# Patient Record
Sex: FEMALE | Race: WHITE | NOT HISPANIC OR LATINO | Employment: UNEMPLOYED | RURAL
[De-identification: names, ages, dates, MRNs, and addresses within clinical notes are randomized per-mention and may not be internally consistent; named-entity substitution may affect disease eponyms.]

---

## 2021-01-28 ENCOUNTER — HISTORICAL (OUTPATIENT)
Dept: ADMINISTRATIVE | Facility: HOSPITAL | Age: 61
End: 2021-01-28

## 2021-01-28 LAB
ABO: NORMAL
ALBUMIN SERPL BCP-MCNC: 3.2 G/DL (ref 3.5–5)
ALBUMIN/GLOB SERPL: 0.6 {RATIO}
ALP SERPL-CCNC: 96 U/L (ref 50–130)
ALT SERPL W P-5'-P-CCNC: 29 U/L (ref 13–56)
AMYLASE SERPL-CCNC: 64 U/L (ref 25–115)
ANION GAP SERPL CALCULATED.3IONS-SCNC: 13 MMOL/L
ANISOCYTOSIS BLD QL SMEAR: ABNORMAL
ANTIBODY SCREEN: NORMAL
AST SERPL W P-5'-P-CCNC: 40 U/L (ref 15–37)
BASOPHILS # BLD AUTO: 0.02 X10E3/UL (ref 0–0.2)
BASOPHILS NFR BLD AUTO: 0.8 % (ref 0–1)
BILIRUB SERPL-MCNC: 0.7 MG/DL (ref 0–1.2)
BILIRUB UR QL STRIP: NEGATIVE MG/DL
BUN SERPL-MCNC: 12 MG/DL (ref 7–18)
BUN/CREAT SERPL: 14.1
CALCIUM SERPL-MCNC: 8.7 MG/DL (ref 8.5–10.1)
CHLORIDE SERPL-SCNC: 108 MMOL/L (ref 98–107)
CLARITY UR: CLEAR
CO2 SERPL-SCNC: 27 MMOL/L (ref 21–32)
COLOR UR: ABNORMAL
CREAT SERPL-MCNC: 0.85 MG/DL (ref 0.55–1.02)
EOSINOPHIL # BLD AUTO: 0.03 X10E3/UL (ref 0–0.5)
EOSINOPHIL NFR BLD AUTO: 1.3 % (ref 1–4)
ERYTHROCYTE [DISTWIDTH] IN BLOOD BY AUTOMATED COUNT: 15.8 % (ref 11.5–14.5)
GLOBULIN SER-MCNC: 5.3 G/DL (ref 2–4)
GLUCOSE SERPL-MCNC: 119 MG/DL (ref 74–106)
GLUCOSE UR STRIP-MCNC: NEGATIVE MG/DL
HCT VFR BLD AUTO: 27.1 % (ref 38–47)
HGB BLD-MCNC: 7.8 G/DL (ref 12–16)
HYPOCHROMIA BLD QL SMEAR: ABNORMAL
IMM GRANULOCYTES # BLD AUTO: 0.01 X10E3/UL (ref 0–0.04)
IMM GRANULOCYTES NFR BLD: 0.4 % (ref 0–0.4)
KETONES UR STRIP-SCNC: NEGATIVE MG/DL
LEUKOCYTE ESTERASE UR QL STRIP: NEGATIVE LEU/UL
LIPASE SERPL-CCNC: 144 U/L (ref 73–393)
LYMPHOCYTES # BLD AUTO: 0.52 X10E3/UL (ref 1–4.8)
LYMPHOCYTES NFR BLD AUTO: 21.9 % (ref 27–41)
LYMPHOCYTES NFR BLD MANUAL: 20 % (ref 27–41)
MCH RBC QN AUTO: 24.3 PG (ref 27–31)
MCHC RBC AUTO-ENTMCNC: 28.8 G/DL (ref 32–36)
MCV RBC AUTO: 84.4 FL (ref 80–96)
MONOCYTES # BLD AUTO: 0.18 X10E3/UL (ref 0–0.8)
MONOCYTES NFR BLD AUTO: 7.6 % (ref 2–6)
MONOCYTES NFR BLD MANUAL: 8 % (ref 2–6)
MPC BLD CALC-MCNC: 9.8 FL (ref 9.4–12.4)
NEUTROPHILS # BLD AUTO: 1.61 X10E3/UL (ref 1.8–7.7)
NEUTROPHILS NFR BLD AUTO: 68 % (ref 53–65)
NEUTS SEG NFR BLD MANUAL: 72 % (ref 50–62)
NITRITE UR QL STRIP: NEGATIVE
NRBC # BLD AUTO: 0 X10E3/UL (ref 0–0)
NRBC, AUTO (.00): 0 /100 (ref 0–0)
OVALOCYTES BLD QL SMEAR: ABNORMAL
PH UR STRIP: 6.5 PH UNITS (ref 5–8)
PLATELET # BLD AUTO: 99 X10E3/UL (ref 150–400)
PLATELET MORPHOLOGY: ABNORMAL
POLYCHROMASIA BLD QL SMEAR: ABNORMAL
POTASSIUM SERPL-SCNC: 5.1 MMOL/L (ref 3.5–5.1)
PROT SERPL-MCNC: 8.5 G/DL (ref 6.4–8.2)
PROT UR QL STRIP: NEGATIVE MG/DL
RBC # BLD AUTO: 3.21 X10E6/UL (ref 4.2–5.4)
RBC # UR STRIP: ABNORMAL ERY/UL
RH TYPE: NORMAL
SARS-COV-2 RNA AMPLIFICATION, QUAL: NEGATIVE
SODIUM SERPL-SCNC: 143 MMOL/L (ref 136–145)
SP GR UR STRIP: 1.01 (ref 1–1.03)
UNIT NUMBER: NORMAL
UNIT STATUS: NORMAL
UROBILINOGEN UR STRIP-ACNC: 0.2 EU/DL
WBC # BLD AUTO: 2.37 X10E3/UL (ref 4.5–11)

## 2021-01-29 ENCOUNTER — HISTORICAL (OUTPATIENT)
Dept: ADMINISTRATIVE | Facility: HOSPITAL | Age: 61
End: 2021-01-29

## 2021-01-29 LAB
ANION GAP SERPL CALCULATED.3IONS-SCNC: 14 MMOL/L
ANISOCYTOSIS BLD QL SMEAR: ABNORMAL
BASOPHILS # BLD AUTO: 0 X10E3/UL (ref 0–0.2)
BASOPHILS NFR BLD AUTO: 0 % (ref 0–1)
BUN SERPL-MCNC: 14 MG/DL (ref 7–18)
CALCIUM SERPL-MCNC: 8.2 MG/DL (ref 8.5–10.1)
CHLORIDE SERPL-SCNC: 107 MMOL/L (ref 98–107)
CO2 SERPL-SCNC: 24 MMOL/L (ref 21–32)
CREAT SERPL-MCNC: 0.95 MG/DL (ref 0.55–1.02)
EOSINOPHIL # BLD AUTO: 0 X10E3/UL (ref 0–0.5)
EOSINOPHIL NFR BLD AUTO: 0 % (ref 1–4)
ERYTHROCYTE [DISTWIDTH] IN BLOOD BY AUTOMATED COUNT: 15.8 % (ref 11.5–14.5)
GLUCOSE SERPL-MCNC: 150 MG/DL (ref 74–106)
HCT VFR BLD AUTO: 26.6 % (ref 38–47)
HCT VFR BLD AUTO: 26.6 % (ref 38–47)
HGB BLD-MCNC: 7.7 G/DL (ref 12–16)
HGB BLD-MCNC: 8.1 G/DL (ref 12–16)
HYPOCHROMIA BLD QL SMEAR: ABNORMAL
IMM GRANULOCYTES # BLD AUTO: 0.02 X10E3/UL (ref 0–0.04)
IMM GRANULOCYTES NFR BLD: 0.5 % (ref 0–0.4)
LYMPHOCYTES # BLD AUTO: 0.33 X10E3/UL (ref 1–4.8)
LYMPHOCYTES NFR BLD AUTO: 7.6 % (ref 27–41)
LYMPHOCYTES NFR BLD MANUAL: 3 % (ref 27–41)
MCH RBC QN AUTO: 24.4 PG (ref 27–31)
MCHC RBC AUTO-ENTMCNC: 28.9 G/DL (ref 32–36)
MCV RBC AUTO: 84.2 FL (ref 80–96)
MONOCYTES # BLD AUTO: 0.12 X10E3/UL (ref 0–0.8)
MONOCYTES NFR BLD AUTO: 2.8 % (ref 2–6)
MONOCYTES NFR BLD MANUAL: 3 % (ref 2–6)
MPC BLD CALC-MCNC: 9.8 FL (ref 9.4–12.4)
NEUTROPHILS # BLD AUTO: 3.86 X10E3/UL (ref 1.8–7.7)
NEUTROPHILS NFR BLD AUTO: 89.1 % (ref 53–65)
NEUTS SEG NFR BLD MANUAL: 94 % (ref 50–62)
NRBC # BLD AUTO: 0 X10E3/UL (ref 0–0)
NRBC, AUTO (.00): 0 /100 (ref 0–0)
OVALOCYTES BLD QL SMEAR: ABNORMAL
PLATELET # BLD AUTO: 93 X10E3/UL (ref 150–400)
PLATELET # BLD AUTO: 96 X10E3/UL (ref 150–400)
PLATELET MORPHOLOGY: ABNORMAL
POLYCHROMASIA BLD QL SMEAR: ABNORMAL
POTASSIUM SERPL-SCNC: 4.6 MMOL/L (ref 3.5–5.1)
RBC # BLD AUTO: 3.16 X10E6/UL (ref 4.2–5.4)
SODIUM SERPL-SCNC: 140 MMOL/L (ref 136–145)
WBC # BLD AUTO: 4.33 X10E3/UL (ref 4.5–11)

## 2021-01-30 ENCOUNTER — HISTORICAL (OUTPATIENT)
Dept: ADMINISTRATIVE | Facility: HOSPITAL | Age: 61
End: 2021-01-30

## 2021-01-30 LAB
ANION GAP SERPL CALCULATED.3IONS-SCNC: 12 MMOL/L
ANION GAP SERPL CALCULATED.3IONS-SCNC: 13 MMOL/L
ANISOCYTOSIS BLD QL SMEAR: ABNORMAL
BASO STIPL BLD QL SMEAR: ABNORMAL
BASOPHILS # BLD AUTO: 0.02 X10E3/UL (ref 0–0.2)
BASOPHILS # BLD AUTO: 0.02 X10E3/UL (ref 0–0.2)
BASOPHILS NFR BLD AUTO: 0.4 % (ref 0–1)
BASOPHILS NFR BLD AUTO: 0.4 % (ref 0–1)
BUN SERPL-MCNC: 12 MG/DL (ref 7–18)
BUN SERPL-MCNC: 13 MG/DL (ref 7–18)
CALCIUM SERPL-MCNC: 7.8 MG/DL (ref 8.5–10.1)
CALCIUM SERPL-MCNC: 7.9 MG/DL (ref 8.5–10.1)
CHLORIDE SERPL-SCNC: 105 MMOL/L (ref 98–107)
CHLORIDE SERPL-SCNC: 107 MMOL/L (ref 98–107)
CO2 SERPL-SCNC: 25 MMOL/L (ref 21–32)
CO2 SERPL-SCNC: 26 MMOL/L (ref 21–32)
CREAT SERPL-MCNC: 0.93 MG/DL (ref 0.55–1.02)
CREAT SERPL-MCNC: 0.99 MG/DL (ref 0.55–1.02)
EOSINOPHIL # BLD AUTO: 0 X10E3/UL (ref 0–0.5)
EOSINOPHIL # BLD AUTO: 0.02 X10E3/UL (ref 0–0.5)
EOSINOPHIL NFR BLD AUTO: 0 % (ref 1–4)
EOSINOPHIL NFR BLD AUTO: 0.4 % (ref 1–4)
ERYTHROCYTE [DISTWIDTH] IN BLOOD BY AUTOMATED COUNT: 16.1 % (ref 11.5–14.5)
ERYTHROCYTE [DISTWIDTH] IN BLOOD BY AUTOMATED COUNT: 16.2 % (ref 11.5–14.5)
GLUCOSE SERPL-MCNC: 108 MG/DL (ref 74–106)
GLUCOSE SERPL-MCNC: 109 MG/DL (ref 74–106)
HCT VFR BLD AUTO: 25 % (ref 38–47)
HCT VFR BLD AUTO: 26.9 % (ref 38–47)
HGB BLD-MCNC: 7.5 G/DL (ref 12–16)
HGB BLD-MCNC: 7.9 G/DL (ref 12–16)
HYPOCHROMIA BLD QL SMEAR: ABNORMAL
IMM GRANULOCYTES # BLD AUTO: 0.01 X10E3/UL (ref 0–0.04)
IMM GRANULOCYTES # BLD AUTO: 0.02 X10E3/UL (ref 0–0.04)
IMM GRANULOCYTES NFR BLD: 0.2 % (ref 0–0.4)
IMM GRANULOCYTES NFR BLD: 0.4 % (ref 0–0.4)
LYMPHOCYTES # BLD AUTO: 1.05 X10E3/UL (ref 1–4.8)
LYMPHOCYTES # BLD AUTO: 1.15 X10E3/UL (ref 1–4.8)
LYMPHOCYTES NFR BLD AUTO: 22.1 % (ref 27–41)
LYMPHOCYTES NFR BLD AUTO: 23 % (ref 27–41)
MCH RBC QN AUTO: 24.6 PG (ref 27–31)
MCH RBC QN AUTO: 25.2 PG (ref 27–31)
MCHC RBC AUTO-ENTMCNC: 29.4 G/DL (ref 32–36)
MCHC RBC AUTO-ENTMCNC: 30 G/DL (ref 32–36)
MCV RBC AUTO: 83.8 FL (ref 80–96)
MCV RBC AUTO: 83.9 FL (ref 80–96)
MONOCYTES # BLD AUTO: 0.52 X10E3/UL (ref 0–0.8)
MONOCYTES # BLD AUTO: 0.53 X10E3/UL (ref 0–0.8)
MONOCYTES NFR BLD AUTO: 10.6 % (ref 2–6)
MONOCYTES NFR BLD AUTO: 10.9 % (ref 2–6)
MPC BLD CALC-MCNC: 10.1 FL (ref 9.4–12.4)
MPC BLD CALC-MCNC: 9.4 FL (ref 9.4–12.4)
NEUTROPHILS # BLD AUTO: 3.13 X10E3/UL (ref 1.8–7.7)
NEUTROPHILS # BLD AUTO: 3.28 X10E3/UL (ref 1.8–7.7)
NEUTROPHILS NFR BLD AUTO: 65.6 % (ref 53–65)
NEUTROPHILS NFR BLD AUTO: 66 % (ref 53–65)
NRBC # BLD AUTO: 0 X10E3/UL (ref 0–0)
NRBC # BLD AUTO: 0 X10E3/UL (ref 0–0)
NRBC, AUTO (.00): 0 /100 (ref 0–0)
NRBC, AUTO (.00): 0 /100 (ref 0–0)
OVALOCYTES BLD QL SMEAR: ABNORMAL
PLATELET # BLD AUTO: 88 X10E3/UL (ref 150–400)
PLATELET # BLD AUTO: 89 X10E3/UL (ref 150–400)
PLATELET MORPHOLOGY: ABNORMAL
POLYCHROMASIA BLD QL SMEAR: ABNORMAL
POTASSIUM SERPL-SCNC: 3.7 MMOL/L (ref 3.5–5.1)
POTASSIUM SERPL-SCNC: 4.1 MMOL/L (ref 3.5–5.1)
RBC # BLD AUTO: 2.98 X10E6/UL (ref 4.2–5.4)
RBC # BLD AUTO: 3.21 X10E6/UL (ref 4.2–5.4)
SODIUM SERPL-SCNC: 140 MMOL/L (ref 136–145)
SODIUM SERPL-SCNC: 140 MMOL/L (ref 136–145)
STOMATOCYTES BLD QL SMEAR: ABNORMAL
WBC # BLD AUTO: 4.75 X10E3/UL (ref 4.5–11)
WBC # BLD AUTO: 5 X10E3/UL (ref 4.5–11)

## 2021-01-31 ENCOUNTER — HISTORICAL (OUTPATIENT)
Dept: ADMINISTRATIVE | Facility: HOSPITAL | Age: 61
End: 2021-01-31

## 2021-01-31 LAB
A1AT SERPL NEPH-MCNC: 157 MG/DL (ref 90–200)
ALBUMIN SERPL BCP-MCNC: 2.7 G/DL (ref 3.5–5)
ALP SERPL-CCNC: 77 U/L (ref 50–130)
ALT SERPL W P-5'-P-CCNC: 26 U/L (ref 13–56)
ANION GAP SERPL CALCULATED.3IONS-SCNC: 12 MMOL/L
AST SERPL W P-5'-P-CCNC: 44 U/L (ref 15–37)
BASOPHILS # BLD AUTO: 0.02 X10E3/UL (ref 0–0.2)
BASOPHILS NFR BLD AUTO: 0.6 % (ref 0–1)
BILIRUB DIRECT SERPL-MCNC: 0.4 MG/DL (ref 0–0.2)
BILIRUB SERPL-MCNC: 1 MG/DL (ref 0–1.2)
BUN SERPL-MCNC: 10 MG/DL (ref 7–18)
CALCIUM SERPL-MCNC: 7.6 MG/DL (ref 8.5–10.1)
CERULOPLASMIN SERPL-MCNC: 23.3 MG/DL (ref 20–60)
CHLORIDE SERPL-SCNC: 106 MMOL/L (ref 98–107)
CO2 SERPL-SCNC: 26 MMOL/L (ref 21–32)
CREAT SERPL-MCNC: 0.87 MG/DL (ref 0.55–1.02)
EOSINOPHIL # BLD AUTO: 0.03 X10E3/UL (ref 0–0.5)
EOSINOPHIL NFR BLD AUTO: 1 % (ref 1–4)
ERYTHROCYTE [DISTWIDTH] IN BLOOD BY AUTOMATED COUNT: 16.4 % (ref 11.5–14.5)
FERRITIN SERPL-MCNC: 14 NG/ML (ref 8–252)
GLUCOSE SERPL-MCNC: 100 MG/DL (ref 74–106)
HAV IGM SER QL: ABNORMAL
HBV CORE IGM SER QL: ABNORMAL
HBV SURFACE AG SERPL QL IA: ABNORMAL
HCT VFR BLD AUTO: 25.3 % (ref 38–47)
HCV AB SER QL: ABNORMAL
HCV AB SER QL: ABNORMAL
HGB BLD-MCNC: 7.4 G/DL (ref 12–16)
IGA SERPL-MCNC: 167 MG/DL (ref 61–356)
IGG SERPL-MCNC: 2250 MG/DL (ref 767–1590)
IGM SERPL-MCNC: 98 MG/DL (ref 37–286)
IMM GRANULOCYTES # BLD AUTO: 0.01 X10E3/UL (ref 0–0.04)
IMM GRANULOCYTES NFR BLD: 0.3 % (ref 0–0.4)
INR BLD: 1.43 (ref 0–3.3)
IRON SATN MFR SERPL: 14 % (ref 14–50)
IRON SERPL-MCNC: 41 UG/DL (ref 50–170)
LYMPHOCYTES # BLD AUTO: 0.85 X10E3/UL (ref 1–4.8)
LYMPHOCYTES NFR BLD AUTO: 27.2 % (ref 27–41)
MCH RBC QN AUTO: 24.9 PG (ref 27–31)
MCHC RBC AUTO-ENTMCNC: 29.2 G/DL (ref 32–36)
MCV RBC AUTO: 85.2 FL (ref 80–96)
MONOCYTES # BLD AUTO: 0.36 X10E3/UL (ref 0–0.8)
MONOCYTES NFR BLD AUTO: 11.5 % (ref 2–6)
MPC BLD CALC-MCNC: 10.6 FL (ref 9.4–12.4)
NEUTROPHILS # BLD AUTO: 1.86 X10E3/UL (ref 1.8–7.7)
NEUTROPHILS NFR BLD AUTO: 59.4 % (ref 53–65)
NRBC # BLD AUTO: 0 X10E3/UL (ref 0–0)
NRBC, AUTO (.00): 0 /100 (ref 0–0)
PLATELET # BLD AUTO: 84 X10E3/UL (ref 150–400)
POTASSIUM SERPL-SCNC: 3.7 MMOL/L (ref 3.5–5.1)
PROT SERPL-MCNC: 7 G/DL (ref 6.4–8.2)
PROTHROMBIN TIME: 16.7 SECONDS (ref 11.7–14.7)
RBC # BLD AUTO: 2.97 X10E6/UL (ref 4.2–5.4)
SODIUM SERPL-SCNC: 140 MMOL/L (ref 136–145)
TIBC SERPL-MCNC: 287 UG/DL (ref 250–450)
WBC # BLD AUTO: 3.13 X10E3/UL (ref 4.5–11)

## 2021-02-01 ENCOUNTER — HISTORICAL (OUTPATIENT)
Dept: ADMINISTRATIVE | Facility: HOSPITAL | Age: 61
End: 2021-02-01

## 2021-02-01 LAB
INSULIN SERPL-ACNC: NORMAL U[IU]/ML

## 2021-02-02 ENCOUNTER — HISTORICAL (OUTPATIENT)
Dept: ADMINISTRATIVE | Facility: HOSPITAL | Age: 61
End: 2021-02-02

## 2021-02-02 LAB
ANA SER QL: NEGATIVE
BILIRUB UR QL STRIP: ABNORMAL MG/DL
CLARITY UR: CLEAR
COLOR UR: YELLOW
GLUCOSE UR STRIP-MCNC: NEGATIVE MG/DL
KETONES UR STRIP-SCNC: >=80 MG/DL
LEUKOCYTE ESTERASE UR QL STRIP: NEGATIVE LEU/UL
NITRITE UR QL STRIP: NEGATIVE
PH UR STRIP: 5.5 PH UNITS (ref 5–8)
PROT UR QL STRIP: NEGATIVE MG/DL
RBC # UR STRIP: ABNORMAL ERY/UL
SMOOTH MUSCLE ANTIBODY: NEGATIVE
SP GR UR STRIP: >=1.03 (ref 1–1.03)
UROBILINOGEN UR STRIP-ACNC: 1 EU/DL

## 2021-02-03 LAB
HCV RNA SERPL NAA+PROBE-ACNC: ABNORMAL IU/ML
HCV RNA SERPL NAA+PROBE-ACNC: ABNORMAL IU/ML
HIV 1+O+2 AB SERPL QL: NORMAL

## 2021-02-05 LAB
ANTI-MITOCHONDRIAL (M2) AB INDEX: 0.05
HGB FRACT BLD ELPH-IMP: NEGATIVE

## 2021-03-21 DIAGNOSIS — N93.9 ABNORMAL UTERINE BLEEDING (AUB): Primary | ICD-10-CM

## 2021-04-12 ENCOUNTER — HOSPITAL ENCOUNTER (EMERGENCY)
Facility: HOSPITAL | Age: 61
Discharge: HOME | End: 2021-04-12
Payer: COMMERCIAL

## 2021-04-12 VITALS
DIASTOLIC BLOOD PRESSURE: 81 MMHG | TEMPERATURE: 98 F | OXYGEN SATURATION: 100 % | HEIGHT: 64 IN | WEIGHT: 229.5 LBS | RESPIRATION RATE: 18 BRPM | HEART RATE: 92 BPM | SYSTOLIC BLOOD PRESSURE: 174 MMHG | BODY MASS INDEX: 39.18 KG/M2

## 2021-04-12 DIAGNOSIS — R10.9 ABDOMINAL PAIN: ICD-10-CM

## 2021-04-12 DIAGNOSIS — N93.8 DYSFUNCTIONAL UTERINE BLEEDING: Primary | ICD-10-CM

## 2021-04-12 DIAGNOSIS — D64.9 ANEMIA, UNSPECIFIED TYPE: ICD-10-CM

## 2021-04-12 LAB
ALBUMIN SERPL BCP-MCNC: 3.4 G/DL (ref 3.5–5)
ALBUMIN/GLOB SERPL: 0.8 {RATIO}
ALP SERPL-CCNC: 91 U/L (ref 50–130)
ALT SERPL W P-5'-P-CCNC: 30 U/L (ref 13–56)
ANION GAP SERPL CALCULATED.3IONS-SCNC: 13 MMOL/L
AST SERPL W P-5'-P-CCNC: 49 U/L (ref 15–37)
BASOPHILS # BLD AUTO: 0.03 X10E3/UL (ref 0–0.2)
BASOPHILS NFR BLD AUTO: 0.7 % (ref 0–1)
BILIRUB SERPL-MCNC: 1.3 MG/DL (ref 0–1.2)
BILIRUB UR QL STRIP: NEGATIVE MG/DL
BUN SERPL-MCNC: 7 MG/DL (ref 7–18)
BUN/CREAT SERPL: 6.4
CALCIUM SERPL-MCNC: 8.4 MG/DL (ref 8.5–10.1)
CHLORIDE SERPL-SCNC: 106 MMOL/L (ref 98–107)
CLARITY UR: ABNORMAL
CO2 SERPL-SCNC: 26 MMOL/L (ref 21–32)
COLOR UR: ABNORMAL
CREAT SERPL-MCNC: 1.1 MG/DL (ref 0.55–1.02)
EOSINOPHIL # BLD AUTO: 0.12 X10E3/UL (ref 0–0.5)
EOSINOPHIL NFR BLD AUTO: 2.9 % (ref 1–4)
ERYTHROCYTE [DISTWIDTH] IN BLOOD BY AUTOMATED COUNT: 18.9 % (ref 11.5–14.5)
GLOBULIN SER-MCNC: 4.4 G/DL (ref 2–4)
GLUCOSE SERPL-MCNC: 112 MG/DL (ref 74–106)
GLUCOSE UR STRIP-MCNC: NEGATIVE MG/DL
HCT VFR BLD AUTO: 25.1 % (ref 38–47)
HGB BLD-MCNC: 7.6 G/DL (ref 12–16)
IMM GRANULOCYTES # BLD AUTO: 0.01 X10E3/UL (ref 0–0.04)
IMM GRANULOCYTES NFR BLD: 0.2 % (ref 0–0.4)
KETONES UR STRIP-SCNC: NEGATIVE MG/DL
LEUKOCYTE ESTERASE UR QL STRIP: NEGATIVE LEU/UL
LYMPHOCYTES # BLD AUTO: 0.94 X10E3/UL (ref 1–4.8)
LYMPHOCYTES NFR BLD AUTO: 22.4 % (ref 27–41)
MCH RBC QN AUTO: 24.7 PG (ref 27–31)
MCHC RBC AUTO-ENTMCNC: 30.3 G/DL (ref 32–36)
MCV RBC AUTO: 81.5 FL (ref 80–96)
MONOCYTES # BLD AUTO: 0.38 X10E3/UL (ref 0–0.8)
MONOCYTES NFR BLD AUTO: 9 % (ref 2–6)
MPC BLD CALC-MCNC: 9.7 FL (ref 9.4–12.4)
NEUTROPHILS # BLD AUTO: 2.72 X10E3/UL (ref 1.8–7.7)
NEUTROPHILS NFR BLD AUTO: 64.8 % (ref 53–65)
NITRITE UR QL STRIP: NEGATIVE
NRBC # BLD AUTO: 0 X10E3/UL (ref 0–0)
NRBC, AUTO (.00): 0 /100 (ref 0–0)
PH UR STRIP: 5.5 PH UNITS (ref 5–8)
PLATELET # BLD AUTO: 116 X10E3/UL (ref 150–400)
POTASSIUM SERPL-SCNC: 4 MMOL/L (ref 3.5–5.1)
PROT SERPL-MCNC: 7.8 G/DL (ref 6.4–8.2)
PROT UR QL STRIP: ABNORMAL MG/DL
RBC # BLD AUTO: 3.08 X10E6/UL (ref 4.2–5.4)
RBC # UR STRIP: ABNORMAL ERY/UL
SODIUM SERPL-SCNC: 141 MMOL/L (ref 136–145)
SP GR UR STRIP: 1.01 (ref 1–1.03)
UROBILINOGEN UR STRIP-ACNC: 0.2 EU/DL
WBC # BLD AUTO: 4.2 X10E3/UL (ref 4.5–11)

## 2021-04-12 PROCEDURE — 80053 COMPREHEN METABOLIC PANEL: CPT

## 2021-04-12 PROCEDURE — 25000003 PHARM REV CODE 250: Performed by: NURSE PRACTITIONER

## 2021-04-12 PROCEDURE — 99285 PR EMERGENCY DEPT VISIT,LEVEL V: ICD-10-PCS | Mod: ,,, | Performed by: NURSE PRACTITIONER

## 2021-04-12 PROCEDURE — 99285 EMERGENCY DEPT VISIT HI MDM: CPT | Mod: ,,, | Performed by: NURSE PRACTITIONER

## 2021-04-12 PROCEDURE — 85025 COMPLETE CBC W/AUTO DIFF WBC: CPT

## 2021-04-12 PROCEDURE — 63600175 PHARM REV CODE 636 W HCPCS: Performed by: NURSE PRACTITIONER

## 2021-04-12 PROCEDURE — 96372 THER/PROPH/DIAG INJ SC/IM: CPT

## 2021-04-12 PROCEDURE — 36415 COLL VENOUS BLD VENIPUNCTURE: CPT

## 2021-04-12 PROCEDURE — 99284 EMERGENCY DEPT VISIT MOD MDM: CPT | Mod: 25

## 2021-04-12 RX ORDER — KETOROLAC TROMETHAMINE 30 MG/ML
60 INJECTION, SOLUTION INTRAMUSCULAR; INTRAVENOUS
Status: COMPLETED | OUTPATIENT
Start: 2021-04-12 | End: 2021-04-12

## 2021-04-12 RX ORDER — MORPHINE SULFATE 4 MG/ML
4 INJECTION, SOLUTION INTRAMUSCULAR; INTRAVENOUS
Status: COMPLETED | OUTPATIENT
Start: 2021-04-12 | End: 2021-04-12

## 2021-04-12 RX ORDER — MEDROXYPROGESTERONE ACETATE 10 MG/1
20 TABLET ORAL ONCE
Status: COMPLETED | OUTPATIENT
Start: 2021-04-12 | End: 2021-04-12

## 2021-04-12 RX ORDER — MEDROXYPROGESTERONE ACETATE 150 MG/ML
150 INJECTION, SUSPENSION INTRAMUSCULAR ONCE
Status: COMPLETED | OUTPATIENT
Start: 2021-04-12 | End: 2021-04-12

## 2021-04-12 RX ORDER — ONDANSETRON 2 MG/ML
4 INJECTION INTRAMUSCULAR; INTRAVENOUS
Status: COMPLETED | OUTPATIENT
Start: 2021-04-12 | End: 2021-04-12

## 2021-04-12 RX ADMIN — MEDROXYPROGESTERONE ACETATE 150 MG: 150 INJECTION, SUSPENSION INTRAMUSCULAR at 09:04

## 2021-04-12 RX ADMIN — KETOROLAC TROMETHAMINE 60 MG: 30 INJECTION, SOLUTION INTRAMUSCULAR at 09:04

## 2021-04-12 RX ADMIN — MORPHINE SULFATE 4 MG: 4 INJECTION INTRAVENOUS at 10:04

## 2021-04-12 RX ADMIN — ONDANSETRON 4 MG: 2 INJECTION INTRAMUSCULAR; INTRAVENOUS at 10:04

## 2021-04-12 RX ADMIN — MEDROXYPROGESTERONE ACETATE 20 MG: 10 TABLET ORAL at 09:04

## 2021-04-19 ENCOUNTER — OFFICE VISIT (OUTPATIENT)
Dept: GASTROENTEROLOGY | Facility: CLINIC | Age: 61
End: 2021-04-19
Payer: COMMERCIAL

## 2021-04-19 VITALS
OXYGEN SATURATION: 100 % | WEIGHT: 216 LBS | SYSTOLIC BLOOD PRESSURE: 124 MMHG | DIASTOLIC BLOOD PRESSURE: 86 MMHG | BODY MASS INDEX: 36.88 KG/M2 | HEIGHT: 64 IN | RESPIRATION RATE: 16 BRPM | HEART RATE: 66 BPM

## 2021-04-19 DIAGNOSIS — R14.0 ABDOMINAL BLOATING: ICD-10-CM

## 2021-04-19 DIAGNOSIS — R10.84 GENERALIZED ABDOMINAL PAIN: ICD-10-CM

## 2021-04-19 DIAGNOSIS — R14.0 ABDOMINAL DISTENSION (GASEOUS): ICD-10-CM

## 2021-04-19 DIAGNOSIS — B18.2 CHRONIC HEPATITIS C WITHOUT HEPATIC COMA: Primary | ICD-10-CM

## 2021-04-19 DIAGNOSIS — Z12.11 ENCOUNTER FOR SCREENING FOR MALIGNANT NEOPLASM OF COLON: ICD-10-CM

## 2021-04-19 DIAGNOSIS — K21.9 GASTROESOPHAGEAL REFLUX DISEASE, UNSPECIFIED WHETHER ESOPHAGITIS PRESENT: ICD-10-CM

## 2021-04-19 PROCEDURE — 99214 PR OFFICE/OUTPT VISIT, EST, LEVL IV, 30-39 MIN: ICD-10-PCS | Mod: ,,, | Performed by: NURSE PRACTITIONER

## 2021-04-19 PROCEDURE — 99214 OFFICE O/P EST MOD 30 MIN: CPT | Mod: ,,, | Performed by: NURSE PRACTITIONER

## 2021-04-19 RX ORDER — MEDROXYPROGESTERONE ACETATE 10 MG/1
TABLET ORAL
Status: ON HOLD | COMMUNITY
Start: 2021-04-13 | End: 2021-05-03 | Stop reason: HOSPADM

## 2021-04-19 RX ORDER — VELPATASVIR AND SOFOSBUVIR 100; 400 MG/1; MG/1
TABLET, FILM COATED ORAL
COMMUNITY
Start: 2021-04-15 | End: 2021-07-27

## 2021-04-22 DIAGNOSIS — N93.9 ABNORMAL UTERINE BLEEDING (AUB): Primary | ICD-10-CM

## 2021-04-28 ENCOUNTER — HOSPITAL ENCOUNTER (INPATIENT)
Facility: HOSPITAL | Age: 61
LOS: 5 days | Discharge: HOME | DRG: 742 | End: 2021-05-03
Attending: OBSTETRICS & GYNECOLOGY | Admitting: OBSTETRICS & GYNECOLOGY
Payer: COMMERCIAL

## 2021-04-28 DIAGNOSIS — Z90.710 S/P HYSTERECTOMY: ICD-10-CM

## 2021-04-28 DIAGNOSIS — D50.0 BLOOD LOSS ANEMIA: Primary | ICD-10-CM

## 2021-04-28 DIAGNOSIS — Z01.818 PREOP TESTING: ICD-10-CM

## 2021-04-28 DIAGNOSIS — N93.9 ABNORMAL UTERINE BLEEDING (AUB): Primary | ICD-10-CM

## 2021-04-28 LAB
ALBUMIN SERPL BCP-MCNC: 3.1 G/DL (ref 3.5–5)
ALBUMIN/GLOB SERPL: 0.7 {RATIO}
ALP SERPL-CCNC: 63 U/L (ref 50–130)
ALT SERPL W P-5'-P-CCNC: 14 U/L (ref 13–56)
ANION GAP SERPL CALCULATED.3IONS-SCNC: 14 MMOL/L (ref 7–16)
ANISOCYTOSIS BLD QL SMEAR: ABNORMAL
APTT PPP: 29.5 SECONDS (ref 25.2–37.3)
AST SERPL W P-5'-P-CCNC: 21 U/L (ref 15–37)
BASOPHILS NFR BLD MANUAL: 1 % (ref 0–1)
BILIRUB SERPL-MCNC: 1.2 MG/DL (ref 0–1.2)
BILIRUB UR QL STRIP: NEGATIVE
BUN SERPL-MCNC: 7 MG/DL (ref 7–18)
BUN/CREAT SERPL: 7 (ref 6–20)
CALCIUM SERPL-MCNC: 8.2 MG/DL (ref 8.5–10.1)
CHLORIDE SERPL-SCNC: 110 MMOL/L (ref 98–107)
CLARITY UR: CLEAR
CO2 SERPL-SCNC: 22 MMOL/L (ref 21–32)
COLOR UR: NORMAL
CREAT SERPL-MCNC: 1.02 MG/DL (ref 0.55–1.02)
DIFFERENTIAL METHOD BLD: ABNORMAL
EOSINOPHIL NFR BLD MANUAL: 1 % (ref 1–4)
ERYTHROCYTE [DISTWIDTH] IN BLOOD BY AUTOMATED COUNT: 17.3 % (ref 11.5–14.5)
GLOBULIN SER-MCNC: 4.4 G/DL (ref 2–4)
GLUCOSE SERPL-MCNC: 96 MG/DL (ref 74–106)
GLUCOSE UR STRIP-MCNC: NEGATIVE MG/DL
HCT VFR BLD AUTO: 22.3 % (ref 38–47)
HCT VFR BLD AUTO: 23.2 % (ref 38–47)
HGB BLD-MCNC: 6.7 G/DL (ref 12–16)
HYPOCHROMIA BLD QL SMEAR: ABNORMAL
INDIRECT COOMBS: NORMAL
INR BLD: 1.7 (ref 0.9–1.1)
KETONES UR STRIP-SCNC: NORMAL MG/DL
LEUKOCYTE ESTERASE UR QL STRIP: NEGATIVE
LYMPHOCYTES NFR BLD MANUAL: 26 % (ref 27–41)
MCH RBC QN AUTO: 23.8 PG (ref 27–31)
MCHC RBC AUTO-ENTMCNC: 30 G/DL (ref 32–36)
MCV RBC AUTO: 79.1 FL (ref 80–96)
MONOCYTES NFR BLD MANUAL: 9 % (ref 2–6)
MPC BLD CALC-MCNC: 9.8 FL (ref 9.4–12.4)
NEUTS BAND NFR BLD MANUAL: 1 % (ref 1–5)
NEUTS SEG NFR BLD MANUAL: 62 % (ref 50–62)
NITRITE UR QL STRIP: NEGATIVE
NRBC # BLD AUTO: 0 X10E3/UL
NRBC BLD MANUAL-RTO: 1 /100 WBC
NRBC, AUTO (.00): 0 %
OVALOCYTES BLD QL SMEAR: ABNORMAL
PH UR STRIP: 6 PH UNITS
PLATELET # BLD AUTO: 113 K/UL (ref 150–400)
PLATELET MORPHOLOGY: ABNORMAL
POLYCHROMASIA BLD QL SMEAR: ABNORMAL
POTASSIUM SERPL-SCNC: 3.7 MMOL/L (ref 3.5–5.1)
PROT SERPL-MCNC: 7.5 G/DL (ref 6.4–8.2)
PROT UR QL STRIP: NEGATIVE
PROTHROMBIN TIME: 19 SECONDS (ref 11.7–14.7)
RBC # BLD AUTO: 2.82 M/UL (ref 4.2–5.4)
RBC # UR STRIP: NEGATIVE /UL
RH BLD: NORMAL
SODIUM SERPL-SCNC: 142 MMOL/L (ref 136–145)
SP GR UR STRIP: <=1.005
UROBILINOGEN UR STRIP-ACNC: 1 MG/DL
WBC # BLD AUTO: 2.02 K/UL (ref 4.5–11)

## 2021-04-28 PROCEDURE — 99900035 HC TECH TIME PER 15 MIN (STAT)

## 2021-04-28 PROCEDURE — 85610 PROTHROMBIN TIME: CPT | Performed by: NURSE PRACTITIONER

## 2021-04-28 PROCEDURE — 85025 COMPLETE CBC W/AUTO DIFF WBC: CPT | Performed by: NURSE PRACTITIONER

## 2021-04-28 PROCEDURE — 80053 COMPREHEN METABOLIC PANEL: CPT | Performed by: NURSE PRACTITIONER

## 2021-04-28 PROCEDURE — 36415 COLL VENOUS BLD VENIPUNCTURE: CPT | Performed by: OBSTETRICS & GYNECOLOGY

## 2021-04-28 PROCEDURE — 36592 COLLECT BLOOD FROM PICC: CPT

## 2021-04-28 PROCEDURE — P9016 RBC LEUKOCYTES REDUCED: HCPCS | Performed by: NURSE PRACTITIONER

## 2021-04-28 PROCEDURE — 86923 COMPATIBILITY TEST ELECTRIC: CPT | Mod: 91 | Performed by: OBSTETRICS & GYNECOLOGY

## 2021-04-28 PROCEDURE — P9016 RBC LEUKOCYTES REDUCED: HCPCS | Performed by: OBSTETRICS & GYNECOLOGY

## 2021-04-28 PROCEDURE — 86923 COMPATIBILITY TEST ELECTRIC: CPT | Mod: 91 | Performed by: NURSE PRACTITIONER

## 2021-04-28 PROCEDURE — 25000003 PHARM REV CODE 250: Performed by: OBSTETRICS & GYNECOLOGY

## 2021-04-28 PROCEDURE — 25000003 PHARM REV CODE 250: Performed by: NURSE PRACTITIONER

## 2021-04-28 PROCEDURE — 81003 URINALYSIS AUTO W/O SCOPE: CPT | Performed by: NURSE PRACTITIONER

## 2021-04-28 PROCEDURE — 85730 THROMBOPLASTIN TIME PARTIAL: CPT | Performed by: NURSE PRACTITIONER

## 2021-04-28 PROCEDURE — 11000001 HC ACUTE MED/SURG PRIVATE ROOM

## 2021-04-28 PROCEDURE — 36430 TRANSFUSION BLD/BLD COMPNT: CPT

## 2021-04-28 PROCEDURE — 86900 BLOOD TYPING SEROLOGIC ABO: CPT | Performed by: NURSE PRACTITIONER

## 2021-04-28 PROCEDURE — 36415 COLL VENOUS BLD VENIPUNCTURE: CPT | Performed by: NURSE PRACTITIONER

## 2021-04-28 PROCEDURE — 85014 HEMATOCRIT: CPT | Performed by: OBSTETRICS & GYNECOLOGY

## 2021-04-28 RX ORDER — SODIUM CHLORIDE 9 MG/ML
INJECTION, SOLUTION INTRAVENOUS CONTINUOUS
Status: DISCONTINUED | OUTPATIENT
Start: 2021-04-28 | End: 2021-05-03

## 2021-04-28 RX ORDER — ONDANSETRON 4 MG/1
8 TABLET, ORALLY DISINTEGRATING ORAL EVERY 8 HOURS PRN
Status: DISCONTINUED | OUTPATIENT
Start: 2021-04-28 | End: 2021-04-29 | Stop reason: HOSPADM

## 2021-04-28 RX ORDER — PROCHLORPERAZINE EDISYLATE 5 MG/ML
5 INJECTION INTRAMUSCULAR; INTRAVENOUS EVERY 6 HOURS PRN
Status: DISCONTINUED | OUTPATIENT
Start: 2021-04-28 | End: 2021-04-29 | Stop reason: HOSPADM

## 2021-04-28 RX ORDER — HYDROCODONE BITARTRATE AND ACETAMINOPHEN 5; 325 MG/1; MG/1
1 TABLET ORAL EVERY 4 HOURS PRN
Status: DISCONTINUED | OUTPATIENT
Start: 2021-04-28 | End: 2021-04-29 | Stop reason: HOSPADM

## 2021-04-28 RX ORDER — HYDROCODONE BITARTRATE AND ACETAMINOPHEN 500; 5 MG/1; MG/1
TABLET ORAL
Status: DISCONTINUED | OUTPATIENT
Start: 2021-04-28 | End: 2021-05-03

## 2021-04-28 RX ORDER — DIPHENHYDRAMINE HYDROCHLORIDE 50 MG/ML
25 INJECTION INTRAMUSCULAR; INTRAVENOUS EVERY 4 HOURS PRN
Status: DISCONTINUED | OUTPATIENT
Start: 2021-04-28 | End: 2021-04-29 | Stop reason: HOSPADM

## 2021-04-28 RX ORDER — DIPHENHYDRAMINE HCL 25 MG
25 CAPSULE ORAL EVERY 4 HOURS PRN
Status: DISCONTINUED | OUTPATIENT
Start: 2021-04-28 | End: 2021-04-29 | Stop reason: HOSPADM

## 2021-04-28 RX ADMIN — SODIUM CHLORIDE: 9 INJECTION, SOLUTION INTRAVENOUS at 01:04

## 2021-04-28 RX ADMIN — SODIUM CHLORIDE: 9 INJECTION, SOLUTION INTRAVENOUS at 11:04

## 2021-04-29 ENCOUNTER — ANESTHESIA EVENT (OUTPATIENT)
Dept: SURGERY | Facility: HOSPITAL | Age: 61
DRG: 742 | End: 2021-04-29
Payer: COMMERCIAL

## 2021-04-29 ENCOUNTER — ANESTHESIA (OUTPATIENT)
Dept: SURGERY | Facility: HOSPITAL | Age: 61
DRG: 742 | End: 2021-04-29
Payer: COMMERCIAL

## 2021-04-29 LAB
HCT VFR BLD AUTO: 31.6 % (ref 38–47)
HCT VFR BLD AUTO: 31.7 % (ref 38–47)
HGB BLD-MCNC: 10 G/DL (ref 12–16)
HGB BLD-MCNC: 9.7 G/DL (ref 12–16)

## 2021-04-29 PROCEDURE — 27201423 OPTIME MED/SURG SUP & DEVICES STERILE SUPPLY: Performed by: OBSTETRICS & GYNECOLOGY

## 2021-04-29 PROCEDURE — 88305 NON-GYN CYTOLOGY: ICD-10-PCS | Mod: 26,XU,, | Performed by: PATHOLOGY

## 2021-04-29 PROCEDURE — 88108 NON-GYN CYTOLOGY: ICD-10-PCS | Mod: 26,XU,, | Performed by: PATHOLOGY

## 2021-04-29 PROCEDURE — 88341 PR IHC OR ICC EACH ADD'L SINGLE ANTIBODY  STAINPR: ICD-10-PCS | Mod: 26,,, | Performed by: PATHOLOGY

## 2021-04-29 PROCEDURE — P9016 RBC LEUKOCYTES REDUCED: HCPCS | Performed by: OBSTETRICS & GYNECOLOGY

## 2021-04-29 PROCEDURE — 88342 IMHCHEM/IMCYTCHM 1ST ANTB: CPT | Mod: 26,,, | Performed by: PATHOLOGY

## 2021-04-29 PROCEDURE — 63600175 PHARM REV CODE 636 W HCPCS: Performed by: ANESTHESIOLOGY

## 2021-04-29 PROCEDURE — 88108 CYTOPATH CONCENTRATE TECH: CPT | Mod: 26,XU,, | Performed by: PATHOLOGY

## 2021-04-29 PROCEDURE — 37000008 HC ANESTHESIA 1ST 15 MINUTES: Performed by: OBSTETRICS & GYNECOLOGY

## 2021-04-29 PROCEDURE — 88309 SURGICAL PATHOLOGY: ICD-10-PCS | Mod: 26,,, | Performed by: PATHOLOGY

## 2021-04-29 PROCEDURE — 63600175 PHARM REV CODE 636 W HCPCS: Performed by: OBSTETRICS & GYNECOLOGY

## 2021-04-29 PROCEDURE — 88305 TISSUE EXAM BY PATHOLOGIST: CPT | Mod: 26,XU,, | Performed by: PATHOLOGY

## 2021-04-29 PROCEDURE — 37000009 HC ANESTHESIA EA ADD 15 MINS: Performed by: OBSTETRICS & GYNECOLOGY

## 2021-04-29 PROCEDURE — 88309 TISSUE EXAM BY PATHOLOGIST: CPT | Mod: 26,,, | Performed by: PATHOLOGY

## 2021-04-29 PROCEDURE — 25000003 PHARM REV CODE 250: Performed by: NURSE ANESTHETIST, CERTIFIED REGISTERED

## 2021-04-29 PROCEDURE — D9220A PRA ANESTHESIA: Mod: ,,, | Performed by: ANESTHESIOLOGY

## 2021-04-29 PROCEDURE — 88305 TISSUE EXAM BY PATHOLOGIST: CPT | Mod: MCY | Performed by: OBSTETRICS & GYNECOLOGY

## 2021-04-29 PROCEDURE — 11000001 HC ACUTE MED/SURG PRIVATE ROOM

## 2021-04-29 PROCEDURE — 88341 IMHCHEM/IMCYTCHM EA ADD ANTB: CPT | Mod: 26,,, | Performed by: PATHOLOGY

## 2021-04-29 PROCEDURE — 36415 COLL VENOUS BLD VENIPUNCTURE: CPT | Performed by: OBSTETRICS & GYNECOLOGY

## 2021-04-29 PROCEDURE — 88342 SURGICAL PATHOLOGY: ICD-10-PCS | Mod: 26,,, | Performed by: PATHOLOGY

## 2021-04-29 PROCEDURE — 36000712 HC OR TIME LEV V 1ST 15 MIN: Performed by: OBSTETRICS & GYNECOLOGY

## 2021-04-29 PROCEDURE — 63600175 PHARM REV CODE 636 W HCPCS: Performed by: NURSE ANESTHETIST, CERTIFIED REGISTERED

## 2021-04-29 PROCEDURE — D9220A PRA ANESTHESIA: ICD-10-PCS | Mod: ,,, | Performed by: ANESTHESIOLOGY

## 2021-04-29 PROCEDURE — 36000713 HC OR TIME LEV V EA ADD 15 MIN: Performed by: OBSTETRICS & GYNECOLOGY

## 2021-04-29 PROCEDURE — 71000033 HC RECOVERY, INTIAL HOUR: Performed by: OBSTETRICS & GYNECOLOGY

## 2021-04-29 PROCEDURE — 25000003 PHARM REV CODE 250: Performed by: NURSE PRACTITIONER

## 2021-04-29 PROCEDURE — 85018 HEMOGLOBIN: CPT | Performed by: OBSTETRICS & GYNECOLOGY

## 2021-04-29 PROCEDURE — 25000003 PHARM REV CODE 250: Performed by: OBSTETRICS & GYNECOLOGY

## 2021-04-29 PROCEDURE — 88309 TISSUE EXAM BY PATHOLOGIST: CPT | Mod: SUR | Performed by: OBSTETRICS & GYNECOLOGY

## 2021-04-29 RX ORDER — KETOROLAC TROMETHAMINE 30 MG/ML
30 INJECTION, SOLUTION INTRAMUSCULAR; INTRAVENOUS EVERY 8 HOURS
Status: DISPENSED | OUTPATIENT
Start: 2021-04-29 | End: 2021-04-30

## 2021-04-29 RX ORDER — PROPOFOL 10 MG/ML
VIAL (ML) INTRAVENOUS
Status: DISCONTINUED | OUTPATIENT
Start: 2021-04-29 | End: 2021-04-29

## 2021-04-29 RX ORDER — CEFAZOLIN SODIUM 1 G/3ML
INJECTION, POWDER, FOR SOLUTION INTRAMUSCULAR; INTRAVENOUS
Status: DISCONTINUED | OUTPATIENT
Start: 2021-04-29 | End: 2021-04-29

## 2021-04-29 RX ORDER — ROCURONIUM BROMIDE 10 MG/ML
INJECTION, SOLUTION INTRAVENOUS
Status: DISCONTINUED | OUTPATIENT
Start: 2021-04-29 | End: 2021-04-29

## 2021-04-29 RX ORDER — BISACODYL 10 MG
10 SUPPOSITORY, RECTAL RECTAL DAILY PRN
Status: DISCONTINUED | OUTPATIENT
Start: 2021-04-29 | End: 2021-05-03

## 2021-04-29 RX ORDER — OXYCODONE AND ACETAMINOPHEN 10; 325 MG/1; MG/1
1 TABLET ORAL EVERY 4 HOURS PRN
Status: DISCONTINUED | OUTPATIENT
Start: 2021-04-29 | End: 2021-05-03

## 2021-04-29 RX ORDER — DIPHENHYDRAMINE HYDROCHLORIDE 50 MG/ML
25 INJECTION INTRAMUSCULAR; INTRAVENOUS EVERY 4 HOURS PRN
Status: DISCONTINUED | OUTPATIENT
Start: 2021-04-29 | End: 2021-05-03

## 2021-04-29 RX ORDER — ONDANSETRON 2 MG/ML
INJECTION INTRAMUSCULAR; INTRAVENOUS
Status: DISCONTINUED | OUTPATIENT
Start: 2021-04-29 | End: 2021-04-29

## 2021-04-29 RX ORDER — DIPHENHYDRAMINE HYDROCHLORIDE 50 MG/ML
25 INJECTION INTRAMUSCULAR; INTRAVENOUS EVERY 6 HOURS PRN
Status: DISCONTINUED | OUTPATIENT
Start: 2021-04-29 | End: 2021-04-29 | Stop reason: HOSPADM

## 2021-04-29 RX ORDER — ONDANSETRON 4 MG/1
8 TABLET, ORALLY DISINTEGRATING ORAL EVERY 8 HOURS PRN
Status: DISCONTINUED | OUTPATIENT
Start: 2021-04-29 | End: 2021-05-03

## 2021-04-29 RX ORDER — FENTANYL CITRATE 50 UG/ML
INJECTION, SOLUTION INTRAMUSCULAR; INTRAVENOUS
Status: DISCONTINUED | OUTPATIENT
Start: 2021-04-29 | End: 2021-04-29

## 2021-04-29 RX ORDER — MEPERIDINE HYDROCHLORIDE 25 MG/ML
25 INJECTION INTRAMUSCULAR; INTRAVENOUS; SUBCUTANEOUS EVERY 10 MIN PRN
Status: DISCONTINUED | OUTPATIENT
Start: 2021-04-29 | End: 2021-04-29 | Stop reason: HOSPADM

## 2021-04-29 RX ORDER — MORPHINE SULFATE 10 MG/ML
4 INJECTION INTRAMUSCULAR; INTRAVENOUS; SUBCUTANEOUS EVERY 5 MIN PRN
Status: DISCONTINUED | OUTPATIENT
Start: 2021-04-29 | End: 2021-04-29 | Stop reason: HOSPADM

## 2021-04-29 RX ORDER — HYDROMORPHONE HYDROCHLORIDE 2 MG/ML
1 INJECTION, SOLUTION INTRAMUSCULAR; INTRAVENOUS; SUBCUTANEOUS EVERY 6 HOURS PRN
Status: DISCONTINUED | OUTPATIENT
Start: 2021-04-29 | End: 2021-05-03 | Stop reason: HOSPADM

## 2021-04-29 RX ORDER — ONDANSETRON 2 MG/ML
4 INJECTION INTRAMUSCULAR; INTRAVENOUS DAILY PRN
Status: DISCONTINUED | OUTPATIENT
Start: 2021-04-29 | End: 2021-04-29 | Stop reason: HOSPADM

## 2021-04-29 RX ORDER — DOCUSATE SODIUM 100 MG/1
100 CAPSULE, LIQUID FILLED ORAL 2 TIMES DAILY
Status: DISCONTINUED | OUTPATIENT
Start: 2021-04-29 | End: 2021-05-03

## 2021-04-29 RX ORDER — LIDOCAINE HYDROCHLORIDE 20 MG/ML
INJECTION INTRAVENOUS
Status: DISCONTINUED | OUTPATIENT
Start: 2021-04-29 | End: 2021-04-29

## 2021-04-29 RX ORDER — OXYCODONE AND ACETAMINOPHEN 5; 325 MG/1; MG/1
1 TABLET ORAL EVERY 4 HOURS PRN
Status: DISCONTINUED | OUTPATIENT
Start: 2021-04-29 | End: 2021-05-03

## 2021-04-29 RX ORDER — MIDAZOLAM HYDROCHLORIDE 1 MG/ML
INJECTION INTRAMUSCULAR; INTRAVENOUS
Status: DISCONTINUED | OUTPATIENT
Start: 2021-04-29 | End: 2021-04-29

## 2021-04-29 RX ORDER — DIPHENHYDRAMINE HCL 25 MG
25 CAPSULE ORAL EVERY 4 HOURS PRN
Status: DISCONTINUED | OUTPATIENT
Start: 2021-04-29 | End: 2021-05-03

## 2021-04-29 RX ORDER — LIDOCAINE HYDROCHLORIDE 40 MG/ML
SOLUTION TOPICAL
Status: DISCONTINUED | OUTPATIENT
Start: 2021-04-29 | End: 2021-04-29 | Stop reason: HOSPADM

## 2021-04-29 RX ORDER — HYDROMORPHONE HYDROCHLORIDE 2 MG/ML
0.5 INJECTION, SOLUTION INTRAMUSCULAR; INTRAVENOUS; SUBCUTANEOUS EVERY 5 MIN PRN
Status: DISCONTINUED | OUTPATIENT
Start: 2021-04-29 | End: 2021-04-29 | Stop reason: HOSPADM

## 2021-04-29 RX ORDER — PROCHLORPERAZINE EDISYLATE 5 MG/ML
5 INJECTION INTRAMUSCULAR; INTRAVENOUS EVERY 6 HOURS PRN
Status: DISCONTINUED | OUTPATIENT
Start: 2021-04-29 | End: 2021-05-03

## 2021-04-29 RX ORDER — IBUPROFEN 600 MG/1
600 TABLET ORAL EVERY 6 HOURS
Status: DISCONTINUED | OUTPATIENT
Start: 2021-04-30 | End: 2021-05-03 | Stop reason: HOSPADM

## 2021-04-29 RX ORDER — DEXAMETHASONE SODIUM PHOSPHATE 4 MG/ML
INJECTION, SOLUTION INTRA-ARTICULAR; INTRALESIONAL; INTRAMUSCULAR; INTRAVENOUS; SOFT TISSUE
Status: DISCONTINUED | OUTPATIENT
Start: 2021-04-29 | End: 2021-04-29

## 2021-04-29 RX ORDER — FUROSEMIDE 10 MG/ML
INJECTION INTRAMUSCULAR; INTRAVENOUS
Status: DISCONTINUED | OUTPATIENT
Start: 2021-04-29 | End: 2021-04-29

## 2021-04-29 RX ADMIN — FENTANYL CITRATE 100 MCG: 50 INJECTION INTRAMUSCULAR; INTRAVENOUS at 09:04

## 2021-04-29 RX ADMIN — SODIUM CHLORIDE, POTASSIUM CHLORIDE, SODIUM LACTATE AND CALCIUM CHLORIDE 1000 ML: 600; 310; 30; 20 INJECTION, SOLUTION INTRAVENOUS at 02:04

## 2021-04-29 RX ADMIN — MORPHINE SULFATE 4 MG: 10 INJECTION INTRAVENOUS at 12:04

## 2021-04-29 RX ADMIN — SODIUM CHLORIDE: 9 INJECTION, SOLUTION INTRAVENOUS at 06:04

## 2021-04-29 RX ADMIN — MIDAZOLAM 2 MG: 1 INJECTION INTRAMUSCULAR; INTRAVENOUS at 09:04

## 2021-04-29 RX ADMIN — CEFAZOLIN 2000 MG: 1 INJECTION, POWDER, FOR SOLUTION INTRAMUSCULAR; INTRAVENOUS; PARENTERAL at 09:04

## 2021-04-29 RX ADMIN — DEXAMETHASONE SODIUM PHOSPHATE 8 MG: 4 INJECTION, SOLUTION INTRA-ARTICULAR; INTRALESIONAL; INTRAMUSCULAR; INTRAVENOUS; SOFT TISSUE at 09:04

## 2021-04-29 RX ADMIN — KETOROLAC TROMETHAMINE 30 MG: 30 INJECTION, SOLUTION INTRAMUSCULAR at 03:04

## 2021-04-29 RX ADMIN — SODIUM CHLORIDE, POTASSIUM CHLORIDE, SODIUM LACTATE AND CALCIUM CHLORIDE: 600; 310; 30; 20 INJECTION, SOLUTION INTRAVENOUS at 09:04

## 2021-04-29 RX ADMIN — ROCURONIUM BROMIDE 30 MG: 10 INJECTION INTRAVENOUS at 10:04

## 2021-04-29 RX ADMIN — SODIUM CHLORIDE: 9 INJECTION, SOLUTION INTRAVENOUS at 01:04

## 2021-04-29 RX ADMIN — ONDANSETRON 8 MG: 2 INJECTION INTRAMUSCULAR; INTRAVENOUS at 09:04

## 2021-04-29 RX ADMIN — HYDROMORPHONE HYDROCHLORIDE 1 MG: 2 INJECTION INTRAMUSCULAR; INTRAVENOUS; SUBCUTANEOUS at 05:04

## 2021-04-29 RX ADMIN — LIDOCAINE HYDROCHLORIDE 100 MG: 20 INJECTION, SOLUTION INTRAVENOUS at 09:04

## 2021-04-29 RX ADMIN — ROCURONIUM BROMIDE 50 MG: 10 INJECTION INTRAVENOUS at 09:04

## 2021-04-29 RX ADMIN — PROPOFOL 150 MG: 10 INJECTION, EMULSION INTRAVENOUS at 09:04

## 2021-04-29 RX ADMIN — MORPHINE SULFATE 3 MG: 10 INJECTION INTRAVENOUS at 12:04

## 2021-04-29 RX ADMIN — FUROSEMIDE 10 MG: 10 INJECTION, SOLUTION INTRAMUSCULAR; INTRAVENOUS at 11:04

## 2021-04-29 RX ADMIN — OXYCODONE HYDROCHLORIDE AND ACETAMINOPHEN 1 TABLET: 5; 325 TABLET ORAL at 09:04

## 2021-04-29 RX ADMIN — SUGAMMADEX 200 MG: 100 INJECTION, SOLUTION INTRAVENOUS at 11:04

## 2021-04-30 DIAGNOSIS — K74.60 HEPATIC CIRRHOSIS, UNSPECIFIED HEPATIC CIRRHOSIS TYPE, UNSPECIFIED WHETHER ASCITES PRESENT: Primary | ICD-10-CM

## 2021-04-30 LAB
ALBUMIN SERPL BCP-MCNC: 2.6 G/DL (ref 3.5–5)
ALP SERPL-CCNC: 65 U/L (ref 50–130)
ALT SERPL W P-5'-P-CCNC: 13 U/L (ref 13–56)
ANISOCYTOSIS BLD QL SMEAR: ABNORMAL
AST SERPL W P-5'-P-CCNC: 27 U/L (ref 15–37)
BILIRUB DIRECT SERPL-MCNC: 0.5 MG/DL (ref 0–0.2)
BILIRUB SERPL-MCNC: 1.8 MG/DL (ref 0–1.2)
DIFFERENTIAL METHOD BLD: ABNORMAL
ERYTHROCYTE [DISTWIDTH] IN BLOOD BY AUTOMATED COUNT: 17.1 % (ref 11.5–14.5)
HCT VFR BLD AUTO: 29.6 % (ref 38–47)
HGB BLD-MCNC: 9.4 G/DL (ref 12–16)
HYPOCHROMIA BLD QL SMEAR: ABNORMAL
INSULIN SERPL-ACNC: NORMAL U[IU]/ML
LAB AP LABORATORY NOTES: NORMAL
LAB AP SPECIMEN A NON-GYN GENERAL CATEGORIZATION: NEGATIVE
LAB AP SPECIMEN A NON-GYN INTERPETATION: NORMAL
LYMPHOCYTES NFR BLD MANUAL: 2 % (ref 27–41)
MCH RBC QN AUTO: 25.6 PG (ref 27–31)
MCHC RBC AUTO-ENTMCNC: 31.8 G/DL (ref 32–36)
MCV RBC AUTO: 80.7 FL (ref 80–96)
MONOCYTES NFR BLD MANUAL: 7 % (ref 2–6)
MPC BLD CALC-MCNC: 10 FL (ref 9.4–12.4)
NEUTS SEG NFR BLD MANUAL: 91 % (ref 50–62)
NRBC # BLD AUTO: 0 X10E3/UL
NRBC, AUTO (.00): 0 %
OVALOCYTES BLD QL SMEAR: ABNORMAL
PLATELET # BLD AUTO: 106 K/UL (ref 150–400)
PLATELET MORPHOLOGY: ABNORMAL
POLYCHROMASIA BLD QL SMEAR: ABNORMAL
PROT SERPL-MCNC: 6.7 G/DL (ref 6.4–8.2)
RBC # BLD AUTO: 3.67 M/UL (ref 4.2–5.4)
WBC # BLD AUTO: 4.78 K/UL (ref 4.5–11)

## 2021-04-30 PROCEDURE — 94761 N-INVAS EAR/PLS OXIMETRY MLT: CPT

## 2021-04-30 PROCEDURE — 11000001 HC ACUTE MED/SURG PRIVATE ROOM

## 2021-04-30 PROCEDURE — 99900035 HC TECH TIME PER 15 MIN (STAT)

## 2021-04-30 PROCEDURE — 63600175 PHARM REV CODE 636 W HCPCS: Performed by: OBSTETRICS & GYNECOLOGY

## 2021-04-30 PROCEDURE — 80076 HEPATIC FUNCTION PANEL: CPT | Performed by: INTERNAL MEDICINE

## 2021-04-30 PROCEDURE — 63600175 PHARM REV CODE 636 W HCPCS: Performed by: STUDENT IN AN ORGANIZED HEALTH CARE EDUCATION/TRAINING PROGRAM

## 2021-04-30 PROCEDURE — 25000003 PHARM REV CODE 250: Performed by: OBSTETRICS & GYNECOLOGY

## 2021-04-30 PROCEDURE — 36415 COLL VENOUS BLD VENIPUNCTURE: CPT | Performed by: OBSTETRICS & GYNECOLOGY

## 2021-04-30 PROCEDURE — 99222 1ST HOSP IP/OBS MODERATE 55: CPT | Mod: ,,, | Performed by: STUDENT IN AN ORGANIZED HEALTH CARE EDUCATION/TRAINING PROGRAM

## 2021-04-30 PROCEDURE — P9047 ALBUMIN (HUMAN), 25%, 50ML: HCPCS | Performed by: STUDENT IN AN ORGANIZED HEALTH CARE EDUCATION/TRAINING PROGRAM

## 2021-04-30 PROCEDURE — 99222 PR INITIAL HOSPITAL CARE,LEVL II: ICD-10-PCS | Mod: ,,, | Performed by: STUDENT IN AN ORGANIZED HEALTH CARE EDUCATION/TRAINING PROGRAM

## 2021-04-30 PROCEDURE — 85025 COMPLETE CBC W/AUTO DIFF WBC: CPT | Performed by: OBSTETRICS & GYNECOLOGY

## 2021-04-30 RX ORDER — ALBUMIN HUMAN 250 G/1000ML
25 SOLUTION INTRAVENOUS ONCE
Status: COMPLETED | OUTPATIENT
Start: 2021-04-30 | End: 2021-04-30

## 2021-04-30 RX ADMIN — ALBUMIN (HUMAN) 25 G: 12.5 SOLUTION INTRAVENOUS at 09:04

## 2021-04-30 RX ADMIN — KETOROLAC TROMETHAMINE 30 MG: 30 INJECTION, SOLUTION INTRAMUSCULAR at 05:04

## 2021-04-30 RX ADMIN — OXYCODONE HYDROCHLORIDE AND ACETAMINOPHEN 1 TABLET: 5; 325 TABLET ORAL at 08:04

## 2021-04-30 RX ADMIN — OXYCODONE HYDROCHLORIDE AND ACETAMINOPHEN 1 TABLET: 5; 325 TABLET ORAL at 05:04

## 2021-04-30 RX ADMIN — Medication 100 MG: at 09:04

## 2021-04-30 RX ADMIN — IBUPROFEN 600 MG: 600 TABLET, FILM COATED ORAL at 08:04

## 2021-04-30 RX ADMIN — IBUPROFEN 600 MG: 600 TABLET, FILM COATED ORAL at 01:04

## 2021-04-30 RX ADMIN — OXYCODONE HYDROCHLORIDE AND ACETAMINOPHEN 1 TABLET: 10; 325 TABLET ORAL at 01:04

## 2021-04-30 RX ADMIN — Medication 100 MG: at 08:04

## 2021-05-01 PROBLEM — R18.8 ABDOMINAL ASCITES: Chronic | Status: ACTIVE | Noted: 2021-05-01

## 2021-05-01 PROBLEM — Z86.19 HISTORY OF HEPATITIS C: Chronic | Status: ACTIVE | Noted: 2021-05-01

## 2021-05-01 PROBLEM — Z90.710 S/P HYSTERECTOMY: Status: ACTIVE | Noted: 2021-05-01

## 2021-05-01 PROBLEM — N93.9 ABNORMAL UTERINE BLEEDING (AUB): Status: RESOLVED | Noted: 2021-04-28 | Resolved: 2021-05-01

## 2021-05-01 PROBLEM — Z01.818 PREOP TESTING: Status: RESOLVED | Noted: 2021-04-28 | Resolved: 2021-05-01

## 2021-05-01 LAB
ALBUMIN SERPL BCP-MCNC: 2.7 G/DL (ref 3.5–5)
ALP SERPL-CCNC: 59 U/L (ref 50–130)
ALT SERPL W P-5'-P-CCNC: 11 U/L (ref 13–56)
AST SERPL W P-5'-P-CCNC: 26 U/L (ref 15–37)
BILIRUB DIRECT SERPL-MCNC: 0.6 MG/DL (ref 0–0.2)
BILIRUB SERPL-MCNC: 2 MG/DL (ref 0–1.2)
PROT SERPL-MCNC: 6.5 G/DL (ref 6.4–8.2)

## 2021-05-01 PROCEDURE — 36415 COLL VENOUS BLD VENIPUNCTURE: CPT | Performed by: INTERNAL MEDICINE

## 2021-05-01 PROCEDURE — 99900035 HC TECH TIME PER 15 MIN (STAT)

## 2021-05-01 PROCEDURE — 25000003 PHARM REV CODE 250: Performed by: OBSTETRICS & GYNECOLOGY

## 2021-05-01 PROCEDURE — 99024 POSTOP FOLLOW-UP VISIT: CPT | Mod: ,,, | Performed by: MIDWIFE

## 2021-05-01 PROCEDURE — 99024 PR POST-OP FOLLOW-UP VISIT: ICD-10-PCS | Mod: ,,, | Performed by: MIDWIFE

## 2021-05-01 PROCEDURE — 11000001 HC ACUTE MED/SURG PRIVATE ROOM

## 2021-05-01 PROCEDURE — 94761 N-INVAS EAR/PLS OXIMETRY MLT: CPT

## 2021-05-01 PROCEDURE — 80076 HEPATIC FUNCTION PANEL: CPT | Performed by: INTERNAL MEDICINE

## 2021-05-01 PROCEDURE — 25000003 PHARM REV CODE 250: Performed by: STUDENT IN AN ORGANIZED HEALTH CARE EDUCATION/TRAINING PROGRAM

## 2021-05-01 RX ORDER — FUROSEMIDE 40 MG/1
40 TABLET ORAL DAILY
Status: DISCONTINUED | OUTPATIENT
Start: 2021-05-01 | End: 2021-05-03

## 2021-05-01 RX ORDER — SPIRONOLACTONE 25 MG/1
100 TABLET ORAL DAILY
Status: DISCONTINUED | OUTPATIENT
Start: 2021-05-01 | End: 2021-05-03

## 2021-05-01 RX ADMIN — OXYCODONE HYDROCHLORIDE AND ACETAMINOPHEN 1 TABLET: 10; 325 TABLET ORAL at 12:05

## 2021-05-01 RX ADMIN — Medication 100 MG: at 08:05

## 2021-05-01 RX ADMIN — IBUPROFEN 600 MG: 600 TABLET, FILM COATED ORAL at 02:05

## 2021-05-01 RX ADMIN — IBUPROFEN 600 MG: 600 TABLET, FILM COATED ORAL at 09:05

## 2021-05-01 RX ADMIN — OXYCODONE HYDROCHLORIDE AND ACETAMINOPHEN 1 TABLET: 10; 325 TABLET ORAL at 03:05

## 2021-05-01 RX ADMIN — FUROSEMIDE 40 MG: 40 TABLET ORAL at 10:05

## 2021-05-01 RX ADMIN — OXYCODONE HYDROCHLORIDE AND ACETAMINOPHEN 1 TABLET: 5; 325 TABLET ORAL at 09:05

## 2021-05-01 RX ADMIN — SPIRONOLACTONE 100 MG: 25 TABLET ORAL at 10:05

## 2021-05-01 RX ADMIN — IBUPROFEN 600 MG: 600 TABLET, FILM COATED ORAL at 08:05

## 2021-05-02 LAB
ABO + RH BLD: NORMAL
ALBUMIN SERPL BCP-MCNC: 2.8 G/DL (ref 3.5–5)
ALP SERPL-CCNC: 67 U/L (ref 50–130)
ALT SERPL W P-5'-P-CCNC: 13 U/L (ref 13–56)
AST SERPL W P-5'-P-CCNC: 23 U/L (ref 15–37)
BILIRUB DIRECT SERPL-MCNC: 0.7 MG/DL (ref 0–0.2)
BILIRUB SERPL-MCNC: 1.7 MG/DL (ref 0–1.2)
BLD PROD TYP BPU: NORMAL
BLOOD UNIT EXPIRATION DATE: NORMAL
BLOOD UNIT TYPE CODE: 6200
CROSSMATCH INTERPRETATION: NORMAL
DISPENSE STATUS: NORMAL
PROT SERPL-MCNC: 6.5 G/DL (ref 6.4–8.2)
UNIT NUMBER: NORMAL

## 2021-05-02 PROCEDURE — 11000001 HC ACUTE MED/SURG PRIVATE ROOM

## 2021-05-02 PROCEDURE — 99900035 HC TECH TIME PER 15 MIN (STAT)

## 2021-05-02 PROCEDURE — 94761 N-INVAS EAR/PLS OXIMETRY MLT: CPT

## 2021-05-02 PROCEDURE — 25000003 PHARM REV CODE 250: Performed by: OBSTETRICS & GYNECOLOGY

## 2021-05-02 PROCEDURE — 25000003 PHARM REV CODE 250: Performed by: STUDENT IN AN ORGANIZED HEALTH CARE EDUCATION/TRAINING PROGRAM

## 2021-05-02 PROCEDURE — 80076 HEPATIC FUNCTION PANEL: CPT | Performed by: INTERNAL MEDICINE

## 2021-05-02 PROCEDURE — 36415 COLL VENOUS BLD VENIPUNCTURE: CPT | Performed by: INTERNAL MEDICINE

## 2021-05-02 RX ORDER — ALUMINUM HYDROXIDE, MAGNESIUM HYDROXIDE, AND SIMETHICONE 2400; 240; 2400 MG/30ML; MG/30ML; MG/30ML
30 SUSPENSION ORAL EVERY 6 HOURS PRN
Status: DISCONTINUED | OUTPATIENT
Start: 2021-05-02 | End: 2021-05-03

## 2021-05-02 RX ADMIN — OXYCODONE HYDROCHLORIDE AND ACETAMINOPHEN 1 TABLET: 5; 325 TABLET ORAL at 09:05

## 2021-05-02 RX ADMIN — IBUPROFEN 600 MG: 600 TABLET, FILM COATED ORAL at 08:05

## 2021-05-02 RX ADMIN — OXYCODONE HYDROCHLORIDE AND ACETAMINOPHEN 1 TABLET: 10; 325 TABLET ORAL at 12:05

## 2021-05-02 RX ADMIN — FUROSEMIDE 40 MG: 40 TABLET ORAL at 12:05

## 2021-05-02 RX ADMIN — IBUPROFEN 600 MG: 600 TABLET, FILM COATED ORAL at 02:05

## 2021-05-02 RX ADMIN — ALUMINUM HYDROXIDE, MAGNESIUM HYDROXIDE, AND DIMETHICONE 30 ML: 400; 400; 40 SUSPENSION ORAL at 09:05

## 2021-05-02 RX ADMIN — OXYCODONE HYDROCHLORIDE AND ACETAMINOPHEN 1 TABLET: 10; 325 TABLET ORAL at 05:05

## 2021-05-02 RX ADMIN — IBUPROFEN 600 MG: 600 TABLET, FILM COATED ORAL at 09:05

## 2021-05-02 RX ADMIN — SPIRONOLACTONE 100 MG: 25 TABLET ORAL at 12:05

## 2021-05-03 VITALS
DIASTOLIC BLOOD PRESSURE: 69 MMHG | OXYGEN SATURATION: 99 % | HEART RATE: 72 BPM | TEMPERATURE: 98 F | BODY MASS INDEX: 38.96 KG/M2 | HEIGHT: 64 IN | WEIGHT: 228.19 LBS | SYSTOLIC BLOOD PRESSURE: 144 MMHG | RESPIRATION RATE: 17 BRPM

## 2021-05-03 LAB
ALBUMIN SERPL BCP-MCNC: 2.6 G/DL (ref 3.5–5)
ALP SERPL-CCNC: 70 U/L (ref 50–130)
ALT SERPL W P-5'-P-CCNC: 12 U/L (ref 13–56)
AST SERPL W P-5'-P-CCNC: 24 U/L (ref 15–37)
BILIRUB DIRECT SERPL-MCNC: 0.5 MG/DL (ref 0–0.2)
BILIRUB SERPL-MCNC: 1.4 MG/DL (ref 0–1.2)
PROT SERPL-MCNC: 6.4 G/DL (ref 6.4–8.2)

## 2021-05-03 PROCEDURE — 99232 PR SUBSEQUENT HOSPITAL CARE,LEVL II: ICD-10-PCS | Mod: ,,, | Performed by: STUDENT IN AN ORGANIZED HEALTH CARE EDUCATION/TRAINING PROGRAM

## 2021-05-03 PROCEDURE — 99232 SBSQ HOSP IP/OBS MODERATE 35: CPT | Mod: ,,, | Performed by: STUDENT IN AN ORGANIZED HEALTH CARE EDUCATION/TRAINING PROGRAM

## 2021-05-03 PROCEDURE — 25000003 PHARM REV CODE 250: Performed by: OBSTETRICS & GYNECOLOGY

## 2021-05-03 PROCEDURE — 36415 COLL VENOUS BLD VENIPUNCTURE: CPT | Performed by: INTERNAL MEDICINE

## 2021-05-03 PROCEDURE — 94761 N-INVAS EAR/PLS OXIMETRY MLT: CPT

## 2021-05-03 PROCEDURE — 99900035 HC TECH TIME PER 15 MIN (STAT)

## 2021-05-03 PROCEDURE — 80076 HEPATIC FUNCTION PANEL: CPT | Performed by: INTERNAL MEDICINE

## 2021-05-03 PROCEDURE — 25000003 PHARM REV CODE 250: Performed by: STUDENT IN AN ORGANIZED HEALTH CARE EDUCATION/TRAINING PROGRAM

## 2021-05-03 RX ORDER — SPIRONOLACTONE 100 MG/1
100 TABLET, FILM COATED ORAL DAILY
Qty: 90 TABLET | Refills: 3 | Status: SHIPPED | OUTPATIENT
Start: 2021-05-03 | End: 2021-07-27

## 2021-05-03 RX ORDER — FUROSEMIDE 40 MG/1
40 TABLET ORAL DAILY
Qty: 90 TABLET | Refills: 3 | Status: SHIPPED | OUTPATIENT
Start: 2021-05-03 | End: 2021-07-27

## 2021-05-03 RX ADMIN — FUROSEMIDE 40 MG: 40 TABLET ORAL at 08:05

## 2021-05-03 RX ADMIN — SPIRONOLACTONE 100 MG: 25 TABLET ORAL at 08:05

## 2021-05-03 RX ADMIN — Medication 100 MG: at 08:05

## 2021-05-03 RX ADMIN — IBUPROFEN 600 MG: 600 TABLET, FILM COATED ORAL at 08:05

## 2021-05-05 LAB
DHEA SERPL-MCNC: NORMAL
ESTRIOL SERPL-MCNC: NORMAL NG/ML
ESTROGEN SERPL-MCNC: NORMAL PG/ML
INSULIN SERPL-ACNC: NORMAL U[IU]/ML
LAB AP GROSS DESCRIPTION: NORMAL
LAB AP LABORATORY NOTES: NORMAL
LAB AP SYNOPTIC CHECKLIST: NORMAL
T3RU NFR SERPL: NORMAL %

## 2021-06-15 DIAGNOSIS — Z11.52 ENCOUNTER FOR SCREENING FOR COVID-19: Primary | ICD-10-CM

## 2021-06-15 LAB
CTP QC/QA: YES
SARS-COV-2 RDRP RESP QL NAA+PROBE: NEGATIVE

## 2021-06-15 PROCEDURE — U0002: ICD-10-PCS | Mod: QW,,, | Performed by: CLINICAL MEDICAL LABORATORY

## 2021-06-15 PROCEDURE — U0002 COVID-19 LAB TEST NON-CDC: HCPCS | Mod: QW,,, | Performed by: CLINICAL MEDICAL LABORATORY

## 2021-06-16 ENCOUNTER — HOSPITAL ENCOUNTER (OUTPATIENT)
Dept: GASTROENTEROLOGY | Facility: HOSPITAL | Age: 61
Discharge: HOME | End: 2021-06-16
Attending: STUDENT IN AN ORGANIZED HEALTH CARE EDUCATION/TRAINING PROGRAM
Payer: COMMERCIAL

## 2021-06-16 ENCOUNTER — ANESTHESIA EVENT (OUTPATIENT)
Dept: GASTROENTEROLOGY | Facility: HOSPITAL | Age: 61
End: 2021-06-16
Payer: COMMERCIAL

## 2021-06-16 ENCOUNTER — ANESTHESIA (OUTPATIENT)
Dept: GASTROENTEROLOGY | Facility: HOSPITAL | Age: 61
End: 2021-06-16
Payer: COMMERCIAL

## 2021-06-16 VITALS
BODY MASS INDEX: 39.14 KG/M2 | RESPIRATION RATE: 11 BRPM | WEIGHT: 228 LBS | SYSTOLIC BLOOD PRESSURE: 118 MMHG | OXYGEN SATURATION: 100 % | DIASTOLIC BLOOD PRESSURE: 68 MMHG | HEART RATE: 75 BPM | TEMPERATURE: 98 F

## 2021-06-16 DIAGNOSIS — K74.60 HEPATIC CIRRHOSIS, UNSPECIFIED HEPATIC CIRRHOSIS TYPE, UNSPECIFIED WHETHER ASCITES PRESENT: ICD-10-CM

## 2021-06-16 PROCEDURE — 88342 SURGICAL PATHOLOGY: ICD-10-PCS | Mod: 26,,, | Performed by: PATHOLOGY

## 2021-06-16 PROCEDURE — 63600175 PHARM REV CODE 636 W HCPCS: Performed by: NURSE ANESTHETIST, CERTIFIED REGISTERED

## 2021-06-16 PROCEDURE — 37000008 HC ANESTHESIA 1ST 15 MINUTES

## 2021-06-16 PROCEDURE — 43239 EGD BIOPSY SINGLE/MULTIPLE: CPT | Mod: ,,, | Performed by: STUDENT IN AN ORGANIZED HEALTH CARE EDUCATION/TRAINING PROGRAM

## 2021-06-16 PROCEDURE — 25000003 PHARM REV CODE 250: Performed by: NURSE ANESTHETIST, CERTIFIED REGISTERED

## 2021-06-16 PROCEDURE — 27201423 OPTIME MED/SURG SUP & DEVICES STERILE SUPPLY

## 2021-06-16 PROCEDURE — 88305 SURGICAL PATHOLOGY: ICD-10-PCS | Mod: 26,,, | Performed by: PATHOLOGY

## 2021-06-16 PROCEDURE — C1889 IMPLANT/INSERT DEVICE, NOC: HCPCS

## 2021-06-16 PROCEDURE — D9220A PRA ANESTHESIA: Mod: ,,, | Performed by: NURSE ANESTHETIST, CERTIFIED REGISTERED

## 2021-06-16 PROCEDURE — 43239 PR EGD, FLEX, W/BIOPSY, SGL/MULTI: ICD-10-PCS | Mod: ,,, | Performed by: STUDENT IN AN ORGANIZED HEALTH CARE EDUCATION/TRAINING PROGRAM

## 2021-06-16 PROCEDURE — D9220A PRA ANESTHESIA: ICD-10-PCS | Mod: ,,, | Performed by: NURSE ANESTHETIST, CERTIFIED REGISTERED

## 2021-06-16 PROCEDURE — 88342 IMHCHEM/IMCYTCHM 1ST ANTB: CPT | Mod: 26,,, | Performed by: PATHOLOGY

## 2021-06-16 PROCEDURE — 88305 TISSUE EXAM BY PATHOLOGIST: CPT | Mod: 26,,, | Performed by: PATHOLOGY

## 2021-06-16 PROCEDURE — 88305 TISSUE EXAM BY PATHOLOGIST: CPT | Mod: SUR | Performed by: STUDENT IN AN ORGANIZED HEALTH CARE EDUCATION/TRAINING PROGRAM

## 2021-06-16 PROCEDURE — 43239 EGD BIOPSY SINGLE/MULTIPLE: CPT

## 2021-06-16 RX ORDER — LIDOCAINE HYDROCHLORIDE 20 MG/ML
INJECTION, SOLUTION EPIDURAL; INFILTRATION; INTRACAUDAL; PERINEURAL
Status: DISCONTINUED | OUTPATIENT
Start: 2021-06-16 | End: 2021-06-16

## 2021-06-16 RX ORDER — SODIUM CHLORIDE 0.9 % (FLUSH) 0.9 %
10 SYRINGE (ML) INJECTION
Status: DISCONTINUED | OUTPATIENT
Start: 2021-06-16 | End: 2021-06-17 | Stop reason: HOSPADM

## 2021-06-16 RX ORDER — ONDANSETRON 4 MG/1
4 TABLET, ORALLY DISINTEGRATING ORAL EVERY 6 HOURS PRN
Qty: 12 TABLET | Refills: 0 | Status: SHIPPED | OUTPATIENT
Start: 2021-06-16 | End: 2021-07-27

## 2021-06-16 RX ORDER — PROPOFOL 10 MG/ML
INJECTION, EMULSION INTRAVENOUS
Status: DISCONTINUED | OUTPATIENT
Start: 2021-06-16 | End: 2021-06-16

## 2021-06-16 RX ORDER — SODIUM CHLORIDE 9 MG/ML
INJECTION, SOLUTION INTRAVENOUS CONTINUOUS
Status: DISCONTINUED | OUTPATIENT
Start: 2021-06-16 | End: 2021-06-17 | Stop reason: HOSPADM

## 2021-06-16 RX ADMIN — SODIUM CHLORIDE: 9 INJECTION, SOLUTION INTRAVENOUS at 10:06

## 2021-06-16 RX ADMIN — PROPOFOL 70 MG: 10 INJECTION, EMULSION INTRAVENOUS at 10:06

## 2021-06-16 RX ADMIN — LIDOCAINE HYDROCHLORIDE 100 MG: 20 INJECTION, SOLUTION INTRAVENOUS at 10:06

## 2021-06-16 RX ADMIN — PROPOFOL 50 MG: 10 INJECTION, EMULSION INTRAVENOUS at 10:06

## 2021-06-17 LAB
ESTROGEN SERPL-MCNC: NORMAL PG/ML
LAB AP GROSS DESCRIPTION: NORMAL
LAB AP LABORATORY NOTES: NORMAL
T3RU NFR SERPL: NORMAL %

## 2021-06-21 DIAGNOSIS — Z11.52 ENCOUNTER FOR SCREENING FOR COVID-19: Primary | ICD-10-CM

## 2021-06-21 LAB
CTP QC/QA: YES
SARS-COV-2 RDRP RESP QL NAA+PROBE: NEGATIVE

## 2021-06-21 PROCEDURE — U0002 COVID-19 LAB TEST NON-CDC: HCPCS | Mod: ,,, | Performed by: NURSE PRACTITIONER

## 2021-06-21 PROCEDURE — U0002: ICD-10-PCS | Mod: ,,, | Performed by: NURSE PRACTITIONER

## 2021-06-23 ENCOUNTER — ANESTHESIA (OUTPATIENT)
Dept: GASTROENTEROLOGY | Facility: HOSPITAL | Age: 61
End: 2021-06-23
Payer: COMMERCIAL

## 2021-06-23 ENCOUNTER — HOSPITAL ENCOUNTER (OUTPATIENT)
Dept: GASTROENTEROLOGY | Facility: HOSPITAL | Age: 61
Discharge: HOME | End: 2021-06-23
Attending: NURSE PRACTITIONER
Payer: COMMERCIAL

## 2021-06-23 ENCOUNTER — ANESTHESIA EVENT (OUTPATIENT)
Dept: GASTROENTEROLOGY | Facility: HOSPITAL | Age: 61
End: 2021-06-23
Payer: COMMERCIAL

## 2021-06-23 VITALS
SYSTOLIC BLOOD PRESSURE: 135 MMHG | OXYGEN SATURATION: 100 % | DIASTOLIC BLOOD PRESSURE: 65 MMHG | HEIGHT: 64 IN | HEART RATE: 72 BPM | RESPIRATION RATE: 19 BRPM | TEMPERATURE: 98 F | BODY MASS INDEX: 30.73 KG/M2 | WEIGHT: 180 LBS

## 2021-06-23 DIAGNOSIS — Z12.11 ENCOUNTER FOR SCREENING FOR MALIGNANT NEOPLASM OF COLON: ICD-10-CM

## 2021-06-23 PROCEDURE — 37000008 HC ANESTHESIA 1ST 15 MINUTES

## 2021-06-23 PROCEDURE — 63600175 PHARM REV CODE 636 W HCPCS: Performed by: NURSE ANESTHETIST, CERTIFIED REGISTERED

## 2021-06-23 PROCEDURE — 27201423 OPTIME MED/SURG SUP & DEVICES STERILE SUPPLY

## 2021-06-23 PROCEDURE — G0121 COLON CA SCRN NOT HI RSK IND: HCPCS

## 2021-06-23 PROCEDURE — 27000284 HC CANNULA NASAL: Performed by: NURSE ANESTHETIST, CERTIFIED REGISTERED

## 2021-06-23 PROCEDURE — G0121 COLON CA SCRN NOT HI RSK IND: ICD-10-PCS | Mod: ,,, | Performed by: STUDENT IN AN ORGANIZED HEALTH CARE EDUCATION/TRAINING PROGRAM

## 2021-06-23 PROCEDURE — D9220A PRA ANESTHESIA: ICD-10-PCS | Mod: ,,, | Performed by: NURSE ANESTHETIST, CERTIFIED REGISTERED

## 2021-06-23 PROCEDURE — 37000009 HC ANESTHESIA EA ADD 15 MINS

## 2021-06-23 PROCEDURE — 25000003 PHARM REV CODE 250: Performed by: STUDENT IN AN ORGANIZED HEALTH CARE EDUCATION/TRAINING PROGRAM

## 2021-06-23 PROCEDURE — D9220A PRA ANESTHESIA: Mod: ,,, | Performed by: NURSE ANESTHETIST, CERTIFIED REGISTERED

## 2021-06-23 PROCEDURE — 25000003 PHARM REV CODE 250: Performed by: NURSE ANESTHETIST, CERTIFIED REGISTERED

## 2021-06-23 PROCEDURE — G0121 COLON CA SCRN NOT HI RSK IND: HCPCS | Mod: ,,, | Performed by: STUDENT IN AN ORGANIZED HEALTH CARE EDUCATION/TRAINING PROGRAM

## 2021-06-23 RX ORDER — LIDOCAINE HYDROCHLORIDE 20 MG/ML
INJECTION, SOLUTION EPIDURAL; INFILTRATION; INTRACAUDAL; PERINEURAL
Status: DISCONTINUED | OUTPATIENT
Start: 2021-06-23 | End: 2021-06-23

## 2021-06-23 RX ORDER — SODIUM CHLORIDE 9 MG/ML
INJECTION, SOLUTION INTRAVENOUS CONTINUOUS
Status: DISCONTINUED | OUTPATIENT
Start: 2021-06-23 | End: 2021-06-24 | Stop reason: HOSPADM

## 2021-06-23 RX ORDER — SODIUM CHLORIDE 0.9 % (FLUSH) 0.9 %
10 SYRINGE (ML) INJECTION
Status: DISCONTINUED | OUTPATIENT
Start: 2021-06-23 | End: 2021-06-24 | Stop reason: HOSPADM

## 2021-06-23 RX ORDER — PROPOFOL 10 MG/ML
VIAL (ML) INTRAVENOUS
Status: DISCONTINUED | OUTPATIENT
Start: 2021-06-23 | End: 2021-06-23

## 2021-06-23 RX ADMIN — PROPOFOL 50 MG: 10 INJECTION, EMULSION INTRAVENOUS at 11:06

## 2021-06-23 RX ADMIN — LIDOCAINE HYDROCHLORIDE 50 MG: 20 INJECTION, SOLUTION EPIDURAL; INFILTRATION; INTRACAUDAL; PERINEURAL at 11:06

## 2021-06-23 RX ADMIN — SODIUM CHLORIDE: 9 INJECTION, SOLUTION INTRAVENOUS at 11:06

## 2021-07-12 ENCOUNTER — OFFICE VISIT (OUTPATIENT)
Dept: GASTROENTEROLOGY | Facility: CLINIC | Age: 61
End: 2021-07-12
Payer: COMMERCIAL

## 2021-07-12 VITALS
RESPIRATION RATE: 19 BRPM | DIASTOLIC BLOOD PRESSURE: 83 MMHG | OXYGEN SATURATION: 100 % | HEART RATE: 82 BPM | SYSTOLIC BLOOD PRESSURE: 134 MMHG | WEIGHT: 180 LBS | HEIGHT: 64 IN | BODY MASS INDEX: 30.73 KG/M2

## 2021-07-12 DIAGNOSIS — B18.2 CHRONIC HEPATITIS C WITHOUT HEPATIC COMA: Primary | ICD-10-CM

## 2021-07-12 DIAGNOSIS — K74.60 HEPATIC CIRRHOSIS, UNSPECIFIED HEPATIC CIRRHOSIS TYPE, UNSPECIFIED WHETHER ASCITES PRESENT: ICD-10-CM

## 2021-07-12 PROCEDURE — 99213 PR OFFICE/OUTPT VISIT, EST, LEVL III, 20-29 MIN: ICD-10-PCS | Mod: ,,, | Performed by: NURSE PRACTITIONER

## 2021-07-12 PROCEDURE — 99213 OFFICE O/P EST LOW 20 MIN: CPT | Mod: ,,, | Performed by: NURSE PRACTITIONER

## 2021-07-12 PROCEDURE — 85610 PROTHROMBIN TIME: CPT | Performed by: NURSE PRACTITIONER

## 2021-07-15 DIAGNOSIS — B18.2 CHRONIC HEPATITIS C WITHOUT HEPATIC COMA: Primary | ICD-10-CM

## 2021-07-20 ENCOUNTER — TELEPHONE (OUTPATIENT)
Dept: GASTROENTEROLOGY | Facility: CLINIC | Age: 61
End: 2021-07-20

## 2021-07-22 ENCOUNTER — PATIENT MESSAGE (OUTPATIENT)
Dept: GASTROENTEROLOGY | Facility: CLINIC | Age: 61
End: 2021-07-22

## 2021-07-22 ENCOUNTER — HOSPITAL ENCOUNTER (OUTPATIENT)
Dept: RADIOLOGY | Facility: HOSPITAL | Age: 61
Discharge: HOME | End: 2021-07-22
Attending: NURSE PRACTITIONER
Payer: COMMERCIAL

## 2021-07-22 DIAGNOSIS — B18.2 CHRONIC HEPATITIS C WITHOUT HEPATIC COMA: ICD-10-CM

## 2021-07-22 PROCEDURE — 76705 ECHO EXAM OF ABDOMEN: CPT | Mod: TC

## 2021-07-22 PROCEDURE — 76705 US ABDOMEN LIMITED: ICD-10-PCS | Mod: 26,,, | Performed by: STUDENT IN AN ORGANIZED HEALTH CARE EDUCATION/TRAINING PROGRAM

## 2021-07-22 PROCEDURE — 76705 ECHO EXAM OF ABDOMEN: CPT | Mod: 26,,, | Performed by: STUDENT IN AN ORGANIZED HEALTH CARE EDUCATION/TRAINING PROGRAM

## 2021-07-27 ENCOUNTER — OFFICE VISIT (OUTPATIENT)
Dept: UROLOGY | Facility: CLINIC | Age: 61
End: 2021-07-27
Payer: COMMERCIAL

## 2021-07-27 VITALS
OXYGEN SATURATION: 99 % | HEIGHT: 64 IN | TEMPERATURE: 98 F | DIASTOLIC BLOOD PRESSURE: 90 MMHG | HEART RATE: 78 BPM | WEIGHT: 180 LBS | SYSTOLIC BLOOD PRESSURE: 136 MMHG | BODY MASS INDEX: 30.73 KG/M2

## 2021-07-27 DIAGNOSIS — R79.89 ELEVATED SERUM CREATININE: Primary | ICD-10-CM

## 2021-07-27 DIAGNOSIS — Z86.19 HISTORY OF HEPATITIS C: Chronic | ICD-10-CM

## 2021-07-27 PROCEDURE — 99214 OFFICE O/P EST MOD 30 MIN: CPT | Mod: PBBFAC | Performed by: NURSE PRACTITIONER

## 2021-07-27 PROCEDURE — 99213 OFFICE O/P EST LOW 20 MIN: CPT | Mod: S$PBB,,, | Performed by: NURSE PRACTITIONER

## 2021-07-27 PROCEDURE — 99213 PR OFFICE/OUTPT VISIT, EST, LEVL III, 20-29 MIN: ICD-10-PCS | Mod: S$PBB,,, | Performed by: NURSE PRACTITIONER

## 2021-07-29 ENCOUNTER — TELEPHONE (OUTPATIENT)
Dept: UROLOGY | Facility: CLINIC | Age: 61
End: 2021-07-29

## 2021-07-29 ENCOUNTER — PATIENT MESSAGE (OUTPATIENT)
Dept: GASTROENTEROLOGY | Facility: CLINIC | Age: 61
End: 2021-07-29

## 2021-07-29 DIAGNOSIS — R60.9 EDEMA, UNSPECIFIED TYPE: Primary | ICD-10-CM

## 2021-07-29 RX ORDER — FUROSEMIDE 20 MG/1
20 TABLET ORAL DAILY
Qty: 30 TABLET | Refills: 11 | Status: SHIPPED | OUTPATIENT
Start: 2021-07-29 | End: 2021-07-29 | Stop reason: SDUPTHER

## 2021-07-29 RX ORDER — SPIRONOLACTONE 50 MG/1
50 TABLET, FILM COATED ORAL DAILY
Qty: 30 TABLET | Refills: 11 | Status: SHIPPED | OUTPATIENT
Start: 2021-07-29 | End: 2021-07-29 | Stop reason: SDUPTHER

## 2021-08-03 ENCOUNTER — PATIENT MESSAGE (OUTPATIENT)
Dept: UROLOGY | Facility: CLINIC | Age: 61
End: 2021-08-03

## 2021-08-03 ENCOUNTER — TELEPHONE (OUTPATIENT)
Dept: UROLOGY | Facility: CLINIC | Age: 61
End: 2021-08-03

## 2021-08-04 ENCOUNTER — PATIENT MESSAGE (OUTPATIENT)
Dept: GASTROENTEROLOGY | Facility: CLINIC | Age: 61
End: 2021-08-04

## 2021-08-05 DIAGNOSIS — R79.89 ELEVATED SERUM CREATININE: Primary | ICD-10-CM

## 2021-08-06 ENCOUNTER — PATIENT MESSAGE (OUTPATIENT)
Dept: GASTROENTEROLOGY | Facility: CLINIC | Age: 61
End: 2021-08-06

## 2021-08-09 ENCOUNTER — PATIENT MESSAGE (OUTPATIENT)
Dept: GASTROENTEROLOGY | Facility: CLINIC | Age: 61
End: 2021-08-09

## 2021-08-09 DIAGNOSIS — R18.8 OTHER ASCITES: Primary | ICD-10-CM

## 2021-08-10 DIAGNOSIS — K74.60 HEPATIC CIRRHOSIS, UNSPECIFIED HEPATIC CIRRHOSIS TYPE, UNSPECIFIED WHETHER ASCITES PRESENT: Primary | ICD-10-CM

## 2021-08-15 ENCOUNTER — PATIENT MESSAGE (OUTPATIENT)
Dept: FAMILY MEDICINE | Facility: CLINIC | Age: 61
End: 2021-08-15

## 2021-08-16 ENCOUNTER — PATIENT MESSAGE (OUTPATIENT)
Dept: GASTROENTEROLOGY | Facility: CLINIC | Age: 61
End: 2021-08-16

## 2021-08-16 DIAGNOSIS — R18.8 OTHER ASCITES: Primary | ICD-10-CM

## 2021-08-16 PROCEDURE — 85610 PROTHROMBIN TIME: CPT | Performed by: NURSE PRACTITIONER

## 2021-08-17 ENCOUNTER — HOSPITAL ENCOUNTER (OUTPATIENT)
Dept: RADIOLOGY | Facility: HOSPITAL | Age: 61
Discharge: HOME | End: 2021-08-17
Attending: NURSE PRACTITIONER
Payer: COMMERCIAL

## 2021-08-17 VITALS
SYSTOLIC BLOOD PRESSURE: 173 MMHG | DIASTOLIC BLOOD PRESSURE: 85 MMHG | OXYGEN SATURATION: 100 % | HEART RATE: 81 BPM | RESPIRATION RATE: 18 BRPM

## 2021-08-17 DIAGNOSIS — K74.60 HEPATIC CIRRHOSIS, UNSPECIFIED HEPATIC CIRRHOSIS TYPE, UNSPECIFIED WHETHER ASCITES PRESENT: ICD-10-CM

## 2021-08-17 LAB
CLARITY FLD: CLEAR
COLOR FLD: ABNORMAL
GRANULOCYTES NFR FLD MANUAL: 3 % (ref 0–25)
LYMPHOCYTES NFR FLD MANUAL: 29 %
MESOTHL CELL NFR FLD MANUAL: 3 %
MONOCYTES NFR FLD MANUAL: 65 %
RBC # FLD MANUAL: <3000 /CUMM
WBC # FLD MANUAL: 460 /CUMM

## 2021-08-17 PROCEDURE — 88305 TISSUE EXAM BY PATHOLOGIST: CPT | Mod: MCY | Performed by: NURSE PRACTITIONER

## 2021-08-17 PROCEDURE — 88305 NON-GYN CYTOLOGY: ICD-10-PCS | Mod: 26,,, | Performed by: PATHOLOGY

## 2021-08-17 PROCEDURE — 89051 BODY FLUID CELL COUNT: CPT | Performed by: NURSE PRACTITIONER

## 2021-08-17 PROCEDURE — 88305 TISSUE EXAM BY PATHOLOGIST: CPT | Mod: 26,,, | Performed by: PATHOLOGY

## 2021-08-17 PROCEDURE — 88108 CYTOPATH CONCENTRATE TECH: CPT | Mod: 26,,, | Performed by: PATHOLOGY

## 2021-08-17 PROCEDURE — 49083 ABD PARACENTESIS W/IMAGING: CPT

## 2021-08-17 PROCEDURE — 88108 NON-GYN CYTOLOGY: ICD-10-PCS | Mod: 26,,, | Performed by: PATHOLOGY

## 2021-08-17 PROCEDURE — 84157 ASSAY OF PROTEIN OTHER: CPT | Performed by: NURSE PRACTITIONER

## 2021-08-17 PROCEDURE — 82042 OTHER SOURCE ALBUMIN QUAN EA: CPT | Performed by: NURSE PRACTITIONER

## 2021-08-17 PROCEDURE — 89050 BODY FLUID CELL COUNT: CPT | Mod: 59 | Performed by: NURSE PRACTITIONER

## 2021-08-17 PROCEDURE — C1729 CATH, DRAINAGE: HCPCS

## 2021-08-18 ENCOUNTER — PATIENT MESSAGE (OUTPATIENT)
Dept: GASTROENTEROLOGY | Facility: CLINIC | Age: 61
End: 2021-08-18

## 2021-08-18 LAB
ALBUMIN FLD-MCNC: 0.4 G/DL
AMYLASE FLD-CCNC: 12 U/L
PROT FLD-MCNC: 1.2 G/DL

## 2021-08-19 DIAGNOSIS — R18.8 OTHER ASCITES: Primary | ICD-10-CM

## 2021-08-19 LAB
INSULIN SERPL-ACNC: NORMAL U[IU]/ML
LAB AP LABORATORY NOTES: NORMAL
LAB AP SPECIMEN A NON-GYN GENERAL CATEGORIZATION: NEGATIVE
LAB AP SPECIMEN A NON-GYN INTERPETATION: NORMAL

## 2021-08-23 ENCOUNTER — PATIENT MESSAGE (OUTPATIENT)
Dept: GASTROENTEROLOGY | Facility: CLINIC | Age: 61
End: 2021-08-23

## 2021-08-23 ENCOUNTER — HOSPITAL ENCOUNTER (OUTPATIENT)
Dept: RADIOLOGY | Facility: HOSPITAL | Age: 61
Discharge: HOME | End: 2021-08-23
Attending: NURSE PRACTITIONER
Payer: COMMERCIAL

## 2021-08-23 DIAGNOSIS — R18.8 OTHER ASCITES: ICD-10-CM

## 2021-08-23 DIAGNOSIS — R18.8 CIRRHOSIS OF LIVER WITH ASCITES, UNSPECIFIED HEPATIC CIRRHOSIS TYPE: Primary | ICD-10-CM

## 2021-08-23 DIAGNOSIS — K74.60 CIRRHOSIS OF LIVER WITH ASCITES, UNSPECIFIED HEPATIC CIRRHOSIS TYPE: Primary | ICD-10-CM

## 2021-08-23 PROCEDURE — 76705 ECHO EXAM OF ABDOMEN: CPT | Mod: TC

## 2021-08-23 PROCEDURE — 76705 US ABDOMEN LIMITED: ICD-10-PCS | Mod: 26,,, | Performed by: RADIOLOGY

## 2021-08-23 PROCEDURE — 76705 ECHO EXAM OF ABDOMEN: CPT | Mod: 26,,, | Performed by: RADIOLOGY

## 2021-08-23 RX ORDER — MIDODRINE HYDROCHLORIDE 5 MG/1
5 TABLET ORAL 2 TIMES DAILY WITH MEALS
Qty: 60 TABLET | Refills: 3 | Status: SHIPPED | OUTPATIENT
Start: 2021-08-23 | End: 2023-11-16

## 2021-09-13 DIAGNOSIS — K74.60 HEPATIC CIRRHOSIS, UNSPECIFIED HEPATIC CIRRHOSIS TYPE, UNSPECIFIED WHETHER ASCITES PRESENT: Primary | ICD-10-CM

## 2021-09-13 RX ORDER — ONDANSETRON 4 MG/1
4 TABLET, ORALLY DISINTEGRATING ORAL EVERY 8 HOURS PRN
Qty: 12 TABLET | Refills: 0 | Status: SHIPPED | OUTPATIENT
Start: 2021-09-13 | End: 2021-11-01 | Stop reason: SDUPTHER

## 2021-09-15 ENCOUNTER — HOSPITAL ENCOUNTER (OUTPATIENT)
Dept: RADIOLOGY | Facility: HOSPITAL | Age: 61
Discharge: HOME | End: 2021-09-15
Attending: NURSE PRACTITIONER
Payer: COMMERCIAL

## 2021-09-15 VITALS
OXYGEN SATURATION: 100 % | SYSTOLIC BLOOD PRESSURE: 154 MMHG | RESPIRATION RATE: 16 BRPM | HEART RATE: 72 BPM | DIASTOLIC BLOOD PRESSURE: 78 MMHG

## 2021-09-15 DIAGNOSIS — R18.8 OTHER ASCITES: ICD-10-CM

## 2021-09-15 PROCEDURE — 49083 ABD PARACENTESIS W/IMAGING: CPT

## 2021-09-16 ENCOUNTER — PATIENT MESSAGE (OUTPATIENT)
Dept: GASTROENTEROLOGY | Facility: CLINIC | Age: 61
End: 2021-09-16

## 2021-09-20 DIAGNOSIS — R18.8 CIRRHOSIS OF LIVER WITH ASCITES, UNSPECIFIED HEPATIC CIRRHOSIS TYPE: Primary | ICD-10-CM

## 2021-09-20 DIAGNOSIS — K74.60 CIRRHOSIS OF LIVER WITH ASCITES, UNSPECIFIED HEPATIC CIRRHOSIS TYPE: Primary | ICD-10-CM

## 2021-09-21 DIAGNOSIS — R18.8 OTHER ASCITES: Primary | ICD-10-CM

## 2021-09-21 DIAGNOSIS — B18.2 CHRONIC HEPATITIS C WITHOUT HEPATIC COMA: ICD-10-CM

## 2021-09-22 ENCOUNTER — TELEPHONE (OUTPATIENT)
Dept: GASTROENTEROLOGY | Facility: CLINIC | Age: 61
End: 2021-09-22

## 2021-09-28 ENCOUNTER — PATIENT MESSAGE (OUTPATIENT)
Dept: GASTROENTEROLOGY | Facility: CLINIC | Age: 61
End: 2021-09-28

## 2021-09-29 DIAGNOSIS — R18.8 OTHER ASCITES: Primary | ICD-10-CM

## 2021-09-30 RX ORDER — ONDANSETRON 4 MG/1
4 TABLET, ORALLY DISINTEGRATING ORAL EVERY 8 HOURS PRN
Qty: 12 TABLET | Refills: 0 | Status: SHIPPED | OUTPATIENT
Start: 2021-09-30 | End: 2021-11-01

## 2021-10-01 DIAGNOSIS — R18.8 OTHER ASCITES: Primary | ICD-10-CM

## 2021-10-04 ENCOUNTER — TELEPHONE (OUTPATIENT)
Dept: GASTROENTEROLOGY | Facility: CLINIC | Age: 61
End: 2021-10-04

## 2021-10-06 ENCOUNTER — PATIENT MESSAGE (OUTPATIENT)
Dept: GASTROENTEROLOGY | Facility: CLINIC | Age: 61
End: 2021-10-06

## 2021-10-08 ENCOUNTER — HOSPITAL ENCOUNTER (OUTPATIENT)
Dept: RADIOLOGY | Facility: HOSPITAL | Age: 61
Discharge: HOME | End: 2021-10-08
Attending: NURSE PRACTITIONER
Payer: COMMERCIAL

## 2021-10-08 DIAGNOSIS — R18.8 OTHER ASCITES: ICD-10-CM

## 2021-10-08 PROCEDURE — 76700 US EXAM ABDOM COMPLETE: CPT | Mod: TC

## 2021-10-08 PROCEDURE — 76700 US EXAM ABDOM COMPLETE: CPT | Mod: 26,,,

## 2021-10-08 PROCEDURE — 76700 US ABDOMEN COMPLETE: ICD-10-PCS | Mod: 26,,,

## 2021-10-12 ENCOUNTER — TELEPHONE (OUTPATIENT)
Dept: GASTROENTEROLOGY | Facility: CLINIC | Age: 61
End: 2021-10-12

## 2021-10-13 ENCOUNTER — HOSPITAL ENCOUNTER (OUTPATIENT)
Dept: RADIOLOGY | Facility: HOSPITAL | Age: 61
Discharge: HOME | End: 2021-10-13
Attending: NURSE PRACTITIONER
Payer: COMMERCIAL

## 2021-10-13 ENCOUNTER — HOSPITAL ENCOUNTER (OUTPATIENT)
Dept: CARDIOLOGY | Facility: HOSPITAL | Age: 61
Discharge: HOME | End: 2021-10-13
Attending: NURSE PRACTITIONER
Payer: COMMERCIAL

## 2021-10-13 VITALS
OXYGEN SATURATION: 98 % | DIASTOLIC BLOOD PRESSURE: 70 MMHG | SYSTOLIC BLOOD PRESSURE: 144 MMHG | HEART RATE: 80 BPM | RESPIRATION RATE: 18 BRPM

## 2021-10-13 DIAGNOSIS — K74.60 HEPATIC CIRRHOSIS, UNSPECIFIED HEPATIC CIRRHOSIS TYPE, UNSPECIFIED WHETHER ASCITES PRESENT: ICD-10-CM

## 2021-10-13 DIAGNOSIS — R18.8 OTHER ASCITES: ICD-10-CM

## 2021-10-13 DIAGNOSIS — B18.2 CHRONIC HEPATITIS C WITHOUT HEPATIC COMA: ICD-10-CM

## 2021-10-13 PROCEDURE — 93306 TTE W/DOPPLER COMPLETE: CPT | Mod: 26,,, | Performed by: STUDENT IN AN ORGANIZED HEALTH CARE EDUCATION/TRAINING PROGRAM

## 2021-10-13 PROCEDURE — 49083 ABD PARACENTESIS W/IMAGING: CPT

## 2021-10-13 PROCEDURE — 93306 TTE W/DOPPLER COMPLETE: CPT

## 2021-10-13 PROCEDURE — 93306 ECHO (CUPID ONLY): ICD-10-PCS | Mod: 26,,, | Performed by: STUDENT IN AN ORGANIZED HEALTH CARE EDUCATION/TRAINING PROGRAM

## 2021-10-14 ENCOUNTER — PATIENT MESSAGE (OUTPATIENT)
Dept: GASTROENTEROLOGY | Facility: CLINIC | Age: 61
End: 2021-10-14
Payer: COMMERCIAL

## 2021-10-14 LAB
AORTIC VALVE CUSP SEPERATION: 2.22 CM
AV INDEX (PROSTH): 0.69
AV VALVE AREA: 2.4 CM2
CV ECHO LV RWT: 0.56 CM
DOP CALC AO VTI: 34.02 CM
DOP CALC LVOT AREA: 3.5 CM2
DOP CALC LVOT DIAMETER: 2.1 CM
DOP CALC LVOT STROKE VOLUME: 81.67 CM3
DOP CALC MV VTI: 37.58 CM
DOP CALCLVOT PEAK VEL VTI: 23.59 CM
E WAVE DECELERATION TIME: 267 MSEC
ECHO LV POSTERIOR WALL: 1.07 CM (ref 0.6–1.1)
EJECTION FRACTION: 55 %
FRACTIONAL SHORTENING: 26 % (ref 28–44)
INTERVENTRICULAR SEPTUM: 0.92 CM (ref 0.6–1.1)
IVC DIAMETER: 1.2 CM
LEFT ATRIUM SIZE: 4 CM
LEFT INTERNAL DIMENSION IN SYSTOLE: 2.82 CM (ref 2.1–4)
LEFT VENTRICULAR INTERNAL DIMENSION IN DIASTOLE: 3.83 CM (ref 3.5–6)
LEFT VENTRICULAR MASS: 117.87 G
MV MEAN GRADIENT: 3 MMHG
MV PEAK E VEL: 0.99 M/S
MV PEAK GRADIENT: 7 MMHG
MV STENOSIS PRESSURE HALF TIME: 117 MS
MV VALVE AREA BY CONTINUITY EQUATION: 2.17 CM2
MV VALVE AREA P 1/2 METHOD: 1.88 CM2
RA PRESSURE: 3 MMHG

## 2021-10-18 ENCOUNTER — PATIENT MESSAGE (OUTPATIENT)
Dept: GASTROENTEROLOGY | Facility: CLINIC | Age: 61
End: 2021-10-18
Payer: COMMERCIAL

## 2021-10-18 RX ORDER — FUROSEMIDE 40 MG/1
40 TABLET ORAL DAILY
Qty: 90 TABLET | Refills: 3 | Status: SHIPPED | OUTPATIENT
Start: 2021-10-18 | End: 2021-10-18 | Stop reason: SDUPTHER

## 2021-10-20 ENCOUNTER — PATIENT MESSAGE (OUTPATIENT)
Dept: GASTROENTEROLOGY | Facility: CLINIC | Age: 61
End: 2021-10-20
Payer: COMMERCIAL

## 2021-10-20 DIAGNOSIS — Z20.822 ENCOUNTER FOR LABORATORY TESTING FOR COVID-19 VIRUS: ICD-10-CM

## 2021-10-21 ENCOUNTER — PATIENT MESSAGE (OUTPATIENT)
Dept: GASTROENTEROLOGY | Facility: CLINIC | Age: 61
End: 2021-10-21
Payer: COMMERCIAL

## 2021-11-01 ENCOUNTER — OFFICE VISIT (OUTPATIENT)
Dept: GASTROENTEROLOGY | Facility: CLINIC | Age: 61
End: 2021-11-01
Payer: COMMERCIAL

## 2021-11-01 DIAGNOSIS — Z86.19 HISTORY OF HEPATITIS C: ICD-10-CM

## 2021-11-01 DIAGNOSIS — K74.60 CIRRHOSIS OF LIVER WITH ASCITES, UNSPECIFIED HEPATIC CIRRHOSIS TYPE: Primary | ICD-10-CM

## 2021-11-01 DIAGNOSIS — R10.9 ABDOMINAL PAIN, UNSPECIFIED ABDOMINAL LOCATION: ICD-10-CM

## 2021-11-01 DIAGNOSIS — R18.8 CIRRHOSIS OF LIVER WITH ASCITES, UNSPECIFIED HEPATIC CIRRHOSIS TYPE: Primary | ICD-10-CM

## 2021-11-01 PROCEDURE — 99214 OFFICE O/P EST MOD 30 MIN: CPT | Mod: ,,, | Performed by: NURSE PRACTITIONER

## 2021-11-01 PROCEDURE — 99214 PR OFFICE/OUTPT VISIT, EST, LEVL IV, 30-39 MIN: ICD-10-PCS | Mod: ,,, | Performed by: NURSE PRACTITIONER

## 2021-11-01 RX ORDER — ONDANSETRON 4 MG/1
4 TABLET, ORALLY DISINTEGRATING ORAL EVERY 8 HOURS PRN
Qty: 20 TABLET | Refills: 1 | Status: SHIPPED | OUTPATIENT
Start: 2021-11-01 | End: 2023-06-30

## 2021-11-03 DIAGNOSIS — K74.60 HEPATIC CIRRHOSIS, UNSPECIFIED HEPATIC CIRRHOSIS TYPE, UNSPECIFIED WHETHER ASCITES PRESENT: Primary | ICD-10-CM

## 2021-11-04 ENCOUNTER — PATIENT MESSAGE (OUTPATIENT)
Dept: GASTROENTEROLOGY | Facility: CLINIC | Age: 61
End: 2021-11-04
Payer: COMMERCIAL

## 2021-11-07 ENCOUNTER — PATIENT MESSAGE (OUTPATIENT)
Dept: GASTROENTEROLOGY | Facility: CLINIC | Age: 61
End: 2021-11-07
Payer: COMMERCIAL

## 2021-11-10 ENCOUNTER — PATIENT MESSAGE (OUTPATIENT)
Dept: GASTROENTEROLOGY | Facility: CLINIC | Age: 61
End: 2021-11-10
Payer: COMMERCIAL

## 2021-11-10 DIAGNOSIS — R18.8 OTHER ASCITES: Primary | ICD-10-CM

## 2021-11-12 ENCOUNTER — HOSPITAL ENCOUNTER (OUTPATIENT)
Dept: RADIOLOGY | Facility: HOSPITAL | Age: 61
Discharge: HOME | End: 2021-11-12
Attending: NURSE PRACTITIONER
Payer: COMMERCIAL

## 2021-11-12 VITALS
OXYGEN SATURATION: 100 % | DIASTOLIC BLOOD PRESSURE: 69 MMHG | HEART RATE: 78 BPM | SYSTOLIC BLOOD PRESSURE: 122 MMHG | RESPIRATION RATE: 16 BRPM

## 2021-11-12 DIAGNOSIS — R18.8 OTHER ASCITES: ICD-10-CM

## 2021-11-12 PROCEDURE — 49083 ABD PARACENTESIS W/IMAGING: CPT

## 2021-11-12 PROCEDURE — C1729 CATH, DRAINAGE: HCPCS

## 2021-11-14 ENCOUNTER — PATIENT MESSAGE (OUTPATIENT)
Dept: GASTROENTEROLOGY | Facility: CLINIC | Age: 61
End: 2021-11-14
Payer: COMMERCIAL

## 2021-11-15 ENCOUNTER — PATIENT MESSAGE (OUTPATIENT)
Dept: GASTROENTEROLOGY | Facility: CLINIC | Age: 61
End: 2021-11-15
Payer: COMMERCIAL

## 2021-11-29 ENCOUNTER — PATIENT MESSAGE (OUTPATIENT)
Dept: GASTROENTEROLOGY | Facility: CLINIC | Age: 61
End: 2021-11-29
Payer: COMMERCIAL

## 2021-11-29 DIAGNOSIS — K74.60 HEPATIC CIRRHOSIS, UNSPECIFIED HEPATIC CIRRHOSIS TYPE, UNSPECIFIED WHETHER ASCITES PRESENT: Primary | ICD-10-CM

## 2021-11-30 ENCOUNTER — PATIENT MESSAGE (OUTPATIENT)
Dept: GASTROENTEROLOGY | Facility: CLINIC | Age: 61
End: 2021-11-30
Payer: COMMERCIAL

## 2021-11-30 DIAGNOSIS — R18.8 OTHER ASCITES: Primary | ICD-10-CM

## 2021-12-02 ENCOUNTER — HOSPITAL ENCOUNTER (OUTPATIENT)
Dept: RADIOLOGY | Facility: HOSPITAL | Age: 61
Discharge: HOME | End: 2021-12-02
Attending: NURSE PRACTITIONER
Payer: COMMERCIAL

## 2021-12-02 VITALS
HEART RATE: 75 BPM | RESPIRATION RATE: 18 BRPM | OXYGEN SATURATION: 99 % | DIASTOLIC BLOOD PRESSURE: 59 MMHG | SYSTOLIC BLOOD PRESSURE: 126 MMHG

## 2021-12-02 DIAGNOSIS — K74.60 HEPATIC CIRRHOSIS, UNSPECIFIED HEPATIC CIRRHOSIS TYPE, UNSPECIFIED WHETHER ASCITES PRESENT: ICD-10-CM

## 2021-12-02 PROCEDURE — C1729 CATH, DRAINAGE: HCPCS

## 2021-12-02 PROCEDURE — 49083 ABD PARACENTESIS W/IMAGING: CPT

## 2021-12-05 ENCOUNTER — PATIENT MESSAGE (OUTPATIENT)
Dept: GASTROENTEROLOGY | Facility: CLINIC | Age: 61
End: 2021-12-05
Payer: COMMERCIAL

## 2021-12-27 ENCOUNTER — PATIENT MESSAGE (OUTPATIENT)
Dept: GASTROENTEROLOGY | Facility: CLINIC | Age: 61
End: 2021-12-27
Payer: COMMERCIAL

## 2021-12-28 DIAGNOSIS — R18.8 CIRRHOSIS OF LIVER WITH ASCITES, UNSPECIFIED HEPATIC CIRRHOSIS TYPE: Primary | ICD-10-CM

## 2021-12-28 DIAGNOSIS — K74.60 CIRRHOSIS OF LIVER WITH ASCITES, UNSPECIFIED HEPATIC CIRRHOSIS TYPE: Primary | ICD-10-CM

## 2021-12-29 ENCOUNTER — HOSPITAL ENCOUNTER (OUTPATIENT)
Dept: RADIOLOGY | Facility: HOSPITAL | Age: 61
Discharge: HOME | End: 2021-12-29
Attending: NURSE PRACTITIONER
Payer: COMMERCIAL

## 2021-12-29 VITALS
RESPIRATION RATE: 18 BRPM | SYSTOLIC BLOOD PRESSURE: 140 MMHG | OXYGEN SATURATION: 98 % | HEART RATE: 78 BPM | DIASTOLIC BLOOD PRESSURE: 70 MMHG

## 2021-12-29 DIAGNOSIS — K74.60 CIRRHOSIS OF LIVER WITH ASCITES, UNSPECIFIED HEPATIC CIRRHOSIS TYPE: ICD-10-CM

## 2021-12-29 DIAGNOSIS — R18.8 CIRRHOSIS OF LIVER WITH ASCITES, UNSPECIFIED HEPATIC CIRRHOSIS TYPE: ICD-10-CM

## 2021-12-29 PROCEDURE — C1729 CATH, DRAINAGE: HCPCS

## 2021-12-29 PROCEDURE — 49083 ABD PARACENTESIS W/IMAGING: CPT

## 2022-01-10 ENCOUNTER — PATIENT MESSAGE (OUTPATIENT)
Dept: GASTROENTEROLOGY | Facility: CLINIC | Age: 62
End: 2022-01-10
Payer: COMMERCIAL

## 2022-01-11 ENCOUNTER — HOSPITAL ENCOUNTER (OUTPATIENT)
Dept: RADIOLOGY | Facility: HOSPITAL | Age: 62
Discharge: HOME | End: 2022-01-11
Attending: STUDENT IN AN ORGANIZED HEALTH CARE EDUCATION/TRAINING PROGRAM
Payer: COMMERCIAL

## 2022-01-11 DIAGNOSIS — K74.69 OTHER CIRRHOSIS OF LIVER: ICD-10-CM

## 2022-01-11 PROCEDURE — 49083 ABD PARACENTESIS W/IMAGING: CPT

## 2022-01-11 PROCEDURE — C1729 CATH, DRAINAGE: HCPCS

## 2022-01-11 NOTE — PROCEDURES
U/S guided paracentesis performed. Procedure performed by Evan YEN under the supervision of Dr. Savage. Informed consent has been obtained including risks and possible complications. Patient prepped with chlorprep and draped in a sterile manner. 6 cc of 1% lidocaine infused.with U/S guidance puncture was in right flank with a 6 [place] centesis catheter and 9 liters of yellow colored ascites drained.Catheter withdrawn and pressure held on puncture site. Bandage then placed. Patient tolerated procedure well with no immediate complication.

## 2022-01-11 NOTE — PROGRESS NOTES
IR consulted for U/S guided paracentesis. H and P has been reviewed. Informed consent has been obtained.

## 2022-01-23 ENCOUNTER — PATIENT MESSAGE (OUTPATIENT)
Dept: GASTROENTEROLOGY | Facility: CLINIC | Age: 62
End: 2022-01-23
Payer: COMMERCIAL

## 2022-01-24 DIAGNOSIS — R18.8 ASCITES OF LIVER: Primary | ICD-10-CM

## 2022-01-26 ENCOUNTER — HOSPITAL ENCOUNTER (OUTPATIENT)
Dept: RADIOLOGY | Facility: HOSPITAL | Age: 62
Discharge: HOME | End: 2022-01-26
Attending: NURSE PRACTITIONER
Payer: COMMERCIAL

## 2022-01-26 VITALS
HEART RATE: 74 BPM | RESPIRATION RATE: 20 BRPM | SYSTOLIC BLOOD PRESSURE: 122 MMHG | DIASTOLIC BLOOD PRESSURE: 66 MMHG | OXYGEN SATURATION: 100 %

## 2022-01-26 DIAGNOSIS — R18.8 ASCITES OF LIVER: ICD-10-CM

## 2022-01-26 PROCEDURE — 49083 ABD PARACENTESIS W/IMAGING: CPT

## 2022-01-26 PROCEDURE — C1729 CATH, DRAINAGE: HCPCS

## 2022-01-26 NOTE — PROGRESS NOTES
Paracentesis  Performed by A Holly RRA  Supervised by Dr.D Savage  Procedure was explained to the patient including risks and possible complications.  Patient agreed to the procedure the consent is signed.  A formal timeout was called all staff present agree patient procedure.  The right lateral abdomen was prepped with ChloraPrep and sterile field was established.  1% lidocaine was used as local anesthetic.  Under ultrasound guidance a 6 [place] centesis catheter was placed into the peritoneal cavity via the right lateral abdomen.  8500 mL yellow ascites was removed.  The catheter was then removed and the puncture site was cleaned and bandaged.  The patient tolerated the procedure well there no immediate postprocedure complications.

## 2022-02-01 ENCOUNTER — PATIENT MESSAGE (OUTPATIENT)
Dept: GASTROENTEROLOGY | Facility: CLINIC | Age: 62
End: 2022-02-01
Payer: COMMERCIAL

## 2022-02-03 ENCOUNTER — PATIENT MESSAGE (OUTPATIENT)
Dept: GASTROENTEROLOGY | Facility: CLINIC | Age: 62
End: 2022-02-03
Payer: COMMERCIAL

## 2022-02-03 DIAGNOSIS — R18.8 OTHER ASCITES: Primary | ICD-10-CM

## 2022-02-08 ENCOUNTER — HOSPITAL ENCOUNTER (OUTPATIENT)
Dept: RADIOLOGY | Facility: HOSPITAL | Age: 62
Discharge: HOME | End: 2022-02-08
Attending: NURSE PRACTITIONER
Payer: COMMERCIAL

## 2022-02-08 DIAGNOSIS — R18.8 OTHER ASCITES: ICD-10-CM

## 2022-02-08 PROCEDURE — 49083 ABD PARACENTESIS W/IMAGING: CPT

## 2022-02-08 PROCEDURE — C1729 CATH, DRAINAGE: HCPCS

## 2022-02-08 NOTE — PROGRESS NOTES
Paracentesis  Performed by A Holly RRA   Supervised by Dr.D Savage  Procedure was explained to the patient including risks and possible complications.  Patient agreed to procedure consent is signed.  A formal timeout was called all staff present agreed to patient and procedure.  The left lateral abdomen was prepped with ChloraPrep and sterile field was established.  1% lidocaine was used as local anesthetic.  Under ultrasound guidance a 6 Yemeni centesis catheter was placed into the peritoneal cavity via the left lateral abdomen.  10.5 L yellow ascites was removed.  The catheter was then removed and the puncture site was cleaned and bandaged.  The patient tolerated the procedure well there were no immediate postprocedure complications.

## 2022-02-24 ENCOUNTER — PATIENT MESSAGE (OUTPATIENT)
Dept: GASTROENTEROLOGY | Facility: CLINIC | Age: 62
End: 2022-02-24
Payer: COMMERCIAL

## 2022-02-24 DIAGNOSIS — R18.8 OTHER ASCITES: Primary | ICD-10-CM

## 2022-02-28 ENCOUNTER — HOSPITAL ENCOUNTER (OUTPATIENT)
Dept: RADIOLOGY | Facility: HOSPITAL | Age: 62
Discharge: HOME | End: 2022-02-28
Attending: NURSE PRACTITIONER
Payer: COMMERCIAL

## 2022-02-28 DIAGNOSIS — R18.8 OTHER ASCITES: ICD-10-CM

## 2022-02-28 PROCEDURE — 49083 ABD PARACENTESIS W/IMAGING: CPT

## 2022-02-28 PROCEDURE — C1729 CATH, DRAINAGE: HCPCS

## 2022-02-28 NOTE — PROGRESS NOTES
Paracentesis  Performed by A Milling RRA  Procedure was explained to the patient including risks and possible complications.  Patient agreed to procedure and the consent is signed.  A formal timeout was called all staff present agree patient procedure.  The right lateral abdomen was prepped with ChloraPrep and sterile field was established.  1% lidocaine was used as local anesthetic.  Under ultrasound guidance a 6 Indian centesis catheter was placed into the peritoneal cavity via the right lateral abdomen.  4200 mL yellow ascites was removed.  Catheter was then removed and the puncture site was cleaned and bandaged.  The patient tolerated the procedure well and there were no immediate postprocedure complications.

## 2022-05-05 ENCOUNTER — HOSPITAL ENCOUNTER (EMERGENCY)
Facility: HOSPITAL | Age: 62
Discharge: HOME | End: 2022-05-05
Attending: EMERGENCY MEDICINE
Payer: COMMERCIAL

## 2022-05-05 VITALS
DIASTOLIC BLOOD PRESSURE: 78 MMHG | OXYGEN SATURATION: 100 % | SYSTOLIC BLOOD PRESSURE: 164 MMHG | RESPIRATION RATE: 18 BRPM | BODY MASS INDEX: 24.07 KG/M2 | HEART RATE: 70 BPM | TEMPERATURE: 98 F | WEIGHT: 141 LBS | HEIGHT: 64 IN

## 2022-05-05 DIAGNOSIS — R11.10 VOMITING: ICD-10-CM

## 2022-05-05 DIAGNOSIS — R11.2 NAUSEA AND VOMITING, INTRACTABILITY OF VOMITING NOT SPECIFIED, UNSPECIFIED VOMITING TYPE: Primary | ICD-10-CM

## 2022-05-05 DIAGNOSIS — R79.89 ELEVATED SERUM CREATININE: ICD-10-CM

## 2022-05-05 LAB
ALBUMIN SERPL BCP-MCNC: 3 G/DL (ref 3.5–5)
ALBUMIN/GLOB SERPL: 0.8 {RATIO}
ALP SERPL-CCNC: 97 U/L (ref 50–130)
ALT SERPL W P-5'-P-CCNC: 14 U/L (ref 13–56)
ANION GAP SERPL CALCULATED.3IONS-SCNC: 16 MMOL/L (ref 7–16)
ANISOCYTOSIS BLD QL SMEAR: ABNORMAL
AST SERPL W P-5'-P-CCNC: 12 U/L (ref 15–37)
BASOPHILS # BLD AUTO: 0.04 K/UL (ref 0–0.2)
BASOPHILS NFR BLD AUTO: 1 % (ref 0–1)
BASOPHILS NFR BLD MANUAL: 2 % (ref 0–1)
BILIRUB SERPL-MCNC: 0.6 MG/DL (ref 0–1.2)
BILIRUB UR QL STRIP: NEGATIVE
BUN SERPL-MCNC: 13 MG/DL (ref 7–18)
BUN/CREAT SERPL: 6 (ref 6–20)
CALCIUM SERPL-MCNC: 8.6 MG/DL (ref 8.5–10.1)
CHLORIDE SERPL-SCNC: 97 MMOL/L (ref 98–107)
CLARITY UR: CLEAR
CO2 SERPL-SCNC: 25 MMOL/L (ref 21–32)
COLOR UR: YELLOW
CREAT SERPL-MCNC: 2.31 MG/DL (ref 0.55–1.02)
DIFFERENTIAL METHOD BLD: ABNORMAL
EOSINOPHIL # BLD AUTO: 0.02 K/UL (ref 0–0.5)
EOSINOPHIL NFR BLD AUTO: 0.5 % (ref 1–4)
ERYTHROCYTE [DISTWIDTH] IN BLOOD BY AUTOMATED COUNT: 18.2 % (ref 11.5–14.5)
GLOBULIN SER-MCNC: 3.6 G/DL (ref 2–4)
GLUCOSE SERPL-MCNC: 124 MG/DL (ref 74–106)
GLUCOSE UR STRIP-MCNC: NEGATIVE MG/DL
HCT VFR BLD AUTO: 30.2 % (ref 38–47)
HGB BLD-MCNC: 10.1 G/DL (ref 12–16)
IMM GRANULOCYTES # BLD AUTO: 0.01 K/UL (ref 0–0.04)
IMM GRANULOCYTES NFR BLD: 0.2 % (ref 0–0.4)
KETONES UR STRIP-SCNC: NEGATIVE MG/DL
LEUKOCYTE ESTERASE UR QL STRIP: NEGATIVE
LIPASE SERPL-CCNC: 14 U/L (ref 73–393)
LYMPHOCYTES # BLD AUTO: 0.4 K/UL (ref 1–4.8)
LYMPHOCYTES NFR BLD AUTO: 9.6 % (ref 27–41)
LYMPHOCYTES NFR BLD MANUAL: 11 % (ref 27–41)
MCH RBC QN AUTO: 30.1 PG (ref 27–31)
MCHC RBC AUTO-ENTMCNC: 33.4 G/DL (ref 32–36)
MCV RBC AUTO: 90.1 FL (ref 80–96)
MONOCYTES # BLD AUTO: 0.1 K/UL (ref 0–0.8)
MONOCYTES NFR BLD AUTO: 2.4 % (ref 2–6)
MONOCYTES NFR BLD MANUAL: 2 % (ref 2–6)
MPC BLD CALC-MCNC: 9.3 FL (ref 9.4–12.4)
NEUTROPHILS # BLD AUTO: 3.61 K/UL (ref 1.8–7.7)
NEUTROPHILS NFR BLD AUTO: 86.3 % (ref 53–65)
NEUTS SEG NFR BLD MANUAL: 85 % (ref 50–62)
NITRITE UR QL STRIP: NEGATIVE
NRBC # BLD AUTO: 0 X10E3/UL
NRBC, AUTO (.00): 0 %
OVALOCYTES BLD QL SMEAR: ABNORMAL
PH UR STRIP: 8 PH UNITS
PLATELET # BLD AUTO: 224 K/UL (ref 150–400)
PLATELET MORPHOLOGY: ABNORMAL
POLYCHROMASIA BLD QL SMEAR: ABNORMAL
POTASSIUM SERPL-SCNC: 4.4 MMOL/L (ref 3.5–5.1)
PROT SERPL-MCNC: 6.6 G/DL (ref 6.4–8.2)
PROT UR QL STRIP: NEGATIVE
RBC # BLD AUTO: 3.35 M/UL (ref 4.2–5.4)
RBC # UR STRIP: NEGATIVE /UL
SODIUM SERPL-SCNC: 134 MMOL/L (ref 136–145)
SP GR UR STRIP: <=1.005
UROBILINOGEN UR STRIP-ACNC: 0.2 MG/DL
WBC # BLD AUTO: 4.18 K/UL (ref 4.5–11)

## 2022-05-05 PROCEDURE — 96361 HYDRATE IV INFUSION ADD-ON: CPT

## 2022-05-05 PROCEDURE — 63600175 PHARM REV CODE 636 W HCPCS: Performed by: EMERGENCY MEDICINE

## 2022-05-05 PROCEDURE — 25000003 PHARM REV CODE 250: Performed by: EMERGENCY MEDICINE

## 2022-05-05 PROCEDURE — 85025 COMPLETE CBC W/AUTO DIFF WBC: CPT | Performed by: EMERGENCY MEDICINE

## 2022-05-05 PROCEDURE — 36415 COLL VENOUS BLD VENIPUNCTURE: CPT | Performed by: EMERGENCY MEDICINE

## 2022-05-05 PROCEDURE — 96365 THER/PROPH/DIAG IV INF INIT: CPT

## 2022-05-05 PROCEDURE — 99283 PR EMERGENCY DEPT VISIT,LEVEL III: ICD-10-PCS | Mod: ,,, | Performed by: EMERGENCY MEDICINE

## 2022-05-05 PROCEDURE — 99283 EMERGENCY DEPT VISIT LOW MDM: CPT | Mod: ,,, | Performed by: EMERGENCY MEDICINE

## 2022-05-05 PROCEDURE — 80053 COMPREHEN METABOLIC PANEL: CPT | Performed by: EMERGENCY MEDICINE

## 2022-05-05 PROCEDURE — 99284 EMERGENCY DEPT VISIT MOD MDM: CPT | Mod: 25

## 2022-05-05 PROCEDURE — 83690 ASSAY OF LIPASE: CPT | Performed by: EMERGENCY MEDICINE

## 2022-05-05 PROCEDURE — 81003 URINALYSIS AUTO W/O SCOPE: CPT | Performed by: EMERGENCY MEDICINE

## 2022-05-05 RX ORDER — PROMETHAZINE HYDROCHLORIDE 25 MG/1
25 SUPPOSITORY RECTAL EVERY 6 HOURS PRN
Qty: 12 SUPPOSITORY | Refills: 0 | Status: SHIPPED | OUTPATIENT
Start: 2022-05-05 | End: 2023-06-30

## 2022-05-05 RX ORDER — PROMETHAZINE HYDROCHLORIDE 25 MG/1
25 TABLET ORAL EVERY 6 HOURS PRN
Qty: 16 TABLET | Refills: 0 | Status: SHIPPED | OUTPATIENT
Start: 2022-05-05 | End: 2023-06-30

## 2022-05-05 RX ADMIN — SODIUM CHLORIDE 1000 ML: 9 INJECTION, SOLUTION INTRAVENOUS at 01:05

## 2022-05-05 RX ADMIN — PROMETHAZINE HYDROCHLORIDE 25 MG: 25 INJECTION INTRAMUSCULAR; INTRAVENOUS at 01:05

## 2022-05-05 NOTE — DISCHARGE INSTRUCTIONS
Stop taking Lasix for now, follow up in clinic with primary care provider on Monday to recheck creatinine.  Return to emergency department for uncontrolled vomiting, or other worsening or further problems.  Drink plenty of water.

## 2022-05-05 NOTE — ED PROVIDER NOTES
Encounter Date: 5/5/2022    SCRIBE #1 NOTE: I, Simi Shaffer am scribing for, and in the presence of,  John Cherry MD. I have scribed the entire note.       History     Chief Complaint   Patient presents with    Vomiting     Early this am     Patient is a 61 year old female who presents to the emergency department complaining of multiple episodes of vomiting that onset this morning. Patient explains that she has been nauseous and experiencing a decreased appetite over the past few days but when throughout this morning she has had 3 episodes of emesis. 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 Patient has tried taking Zofran for her nausea with no success. She reports a change in medications over the last week. Patient denies any fever, diarrhea, or constipation.     The history is provided by the patient. No  was used.     Review of patient's allergies indicates:  No Known Allergies  Past Medical History:   Diagnosis Date    Abnormal uterine bleeding (AUB) 4/28/2021    Elevated serum creatinine 7/27/2021    Rechecked at 2.1 following completion of treatment for Hep C.    Hepatitis C      Past Surgical History:   Procedure Laterality Date    CHOLECYSTECTOMY      ROBOT-ASSISTED LAPAROSCOPIC HYSTERECTOMY N/A 4/29/2021    Procedure: ROBOTIC HYSTERECTOMY;  Surgeon: Swapna Sow DO;  Location: [facility] OR;  Service: OB/GYN;  Laterality: N/A;    ROBOT-ASSISTED LAPAROSCOPIC SALPINGO-OOPHORECTOMY Bilateral 4/29/2021    Procedure: ROBOTIC SALPINGO-OOPHORECTOMY;  Surgeon: Swapna Sow DO;  Location: [facility] OR;  Service: OB/GYN;  Laterality: Bilateral;    TONSILLECTOMY       Family History   Problem Relation Age of Onset    Heart disease Father     No Known Problems Mother       Social History     Tobacco Use    Smoking status: Never Smoker    Smokeless tobacco: Never Used   Substance Use Topics    Alcohol use: Not Currently    Drug use: Not Currently     Review of Systems   Constitutional: Positive for appetite change (decreased).   HENT: Negative.    Eyes: Negative.    Respiratory: Negative.    Cardiovascular: Negative.    Gastrointestinal: Positive for nausea (x a few days) and vomiting (onset this am). Negative for abdominal pain, constipation and diarrhea.   Endocrine: Negative.    Genitourinary: Negative.    Musculoskeletal: Negative.    Skin: Negative.    Allergic/Immunologic: Negative.    Neurological: Negative.    Hematological: Negative.    Psychiatric/Behavioral: Negative.    All other systems reviewed and are negative.      Physical Exam     Initial Vitals [05/05/22 1256]   BP Pulse Resp Temp SpO2   126/84 89 18 98.3 °F (36.8 °C) 99 %      MAP       --         Physical Exam    Nursing note and vitals reviewed.  HENT:   Head: Normocephalic and atraumatic.   Mouth/Throat: Oropharynx is clear and moist.   Eyes: Pupils are equal, round, and reactive to light.   Neck: Neck supple.   Normal range of motion.  Cardiovascular: Normal rate and regular rhythm.   Pulmonary/Chest: Effort normal and breath sounds normal.   Abdominal: Abdomen is soft. She exhibits no distension.   Large and well healing suture wound over abdomen secondary to liver transplant procedure    Musculoskeletal:         General: Normal range of motion.      Cervical back: Normal range of motion and neck supple.     Neurological: She is alert.   Skin: Skin is warm. Capillary refill takes less than 2 seconds.   Psychiatric: She has a normal mood and affect.         ED Course   Procedures  Labs Reviewed   COMPREHENSIVE METABOLIC PANEL - Abnormal; Notable for the following components:       Result Value    Sodium 134 (*)     Chloride 97 (*)     Glucose 124 (*)     Creatinine 2.31 (*)     Albumin 3.0 (*)     AST 12  (*)     eGFR 23 (*)     All other components within normal limits   LIPASE - Abnormal; Notable for the following components:    Lipase 14 (*)     All other components within normal limits   CBC WITH DIFFERENTIAL - Abnormal; Notable for the following components:    WBC 4.18 (*)     RBC 3.35 (*)     Hemoglobin 10.1 (*)     Hematocrit 30.2 (*)     RDW 18.2 (*)     MPV 9.3 (*)     Neutrophils % 86.3 (*)     Lymphocytes % 9.6 (*)     Eosinophils % 0.5 (*)     Lymphocytes, Absolute 0.40 (*)     All other components within normal limits   MANUAL DIFFERENTIAL - Abnormal; Notable for the following components:    Segmented Neutrophils, Man % 85 (*)     Lymphocytes, Man % 11 (*)     Basophils, Man % 2 (*)     Platelet Morphology Platelet Clumping (*)     All other components within normal limits   URINALYSIS, REFLEX TO URINE CULTURE - Normal   CBC W/ AUTO DIFFERENTIAL    Narrative:     The following orders were created for panel order CBC auto differential.  Procedure                               Abnormality         Status                     ---------                               -----------         ------                     CBC with Differential[[id number]]        Abnormal            Final result               Manual Differential[[id number]]          Abnormal            Final result                 Please view results for these tests on the individual orders.          Imaging Results          X-Ray Abdomen Flat And Erect (Final result)  Result time 05/05/22 13:55:24    Final result by Duarte Kunz II, MD (05/05/22 13:55:24)                 Impression:      No evidence of acute process demonstrated      Electronically signed by: Duarte Kunz  Date:    05/05/2022  Time:    13:55             Narrative:    EXAMINATION:  XR ABDOMEN FLAT AND ERECT    CLINICAL HISTORY:  Abdominal pain    COMPARISON:  12 April 2021    FINDINGS:  No free fluid or free air seen.  The bowel gas pattern appears within normal limits.  No  abnormal calcifications are present.  Multiple clips present from previous surgery.  No other abnormality is identified.                                 Medications   sodium chloride 0.9% bolus 1,000 mL (0 mLs Intravenous Stopped 5/5/22 1445)   promethazine (PHENERGAN) 25 mg in dextrose 5 % 50 mL IVPB (0 mg Intravenous Stopped 5/5/22 1407)                Attending Attestation:           Physician Attestation for Scribe:  Physician Attestation Statement for Scribe #1: I, John Cherry MD, reviewed documentation, as scribed by Simi Shaffer in my presence, and it is both accurate and complete.             ED Course as of 05/07/22 0601   Thu May 05, 2022   1421 Lipase(!): 14 [BB]   1421 Sodium(!): 134 [BB]   1421 BUN: 13 [BB]   1421 Creatinine(!): 2.31 [BB]   1421 Alkaline Phosphatase: 97 [BB]   1421 ALT: 14 [BB]   1421 AST(!): 12 [BB]   1421 BILIRUBIN TOTAL: 0.6 [BB]   1421 eGFR if non (!): 23 [BB]   1504 Lipase(!): 14 [BB]   1533 WBC(!): 4.18 [BB]   1533 Hemoglobin(!): 10.1 [BB]   1533 Hematocrit(!): 30.2 [BB]   1536 Patient feels much better and wants to go home now.  She is counseled on her elevated creatinine and the need to follow that up.  I also recommended patient decrease her Lasix. [BB]      ED Course User Index  [BB] John Cherry MD             Clinical Impression:   Final diagnoses:  [R11.10] Vomiting  [R11.2] Nausea and vomiting, intractability of vomiting not specified, unspecified vomiting type (Primary)  [R79.89] Elevated serum creatinine          ED Disposition Condition    Discharge Stable        ED Prescriptions     Medication Sig Dispense Start Date End Date Auth. Provider    promethazine (PHENERGAN) 25 MG tablet Take 1 tablet (25 mg total) by mouth every 6 (six) hours as needed for Nausea. 16 tablet 5/5/2022  John Cherry MD    promethazine (PHENERGAN) 25 MG suppository Place 1 suppository (25 mg total) rectally every 6 (six) hours as needed for Nausea. 12 suppository 5/5/2022   John Cherry MD        Follow-up Information    None          John Cherry MD  05/07/22 0602

## 2022-05-13 PROCEDURE — 85610 PROTHROMBIN TIME: CPT | Performed by: STUDENT IN AN ORGANIZED HEALTH CARE EDUCATION/TRAINING PROGRAM

## 2022-06-22 ENCOUNTER — HOSPITAL ENCOUNTER (EMERGENCY)
Facility: HOSPITAL | Age: 62
Discharge: ANOTHER HOSPITAL | End: 2022-06-22
Attending: EMERGENCY MEDICINE
Payer: COMMERCIAL

## 2022-06-22 VITALS
DIASTOLIC BLOOD PRESSURE: 94 MMHG | WEIGHT: 133.5 LBS | HEIGHT: 64 IN | HEART RATE: 67 BPM | SYSTOLIC BLOOD PRESSURE: 187 MMHG | BODY MASS INDEX: 22.79 KG/M2 | RESPIRATION RATE: 16 BRPM | TEMPERATURE: 98 F | OXYGEN SATURATION: 98 %

## 2022-06-22 DIAGNOSIS — K65.2 SPONTANEOUS BACTERIAL PERITONITIS: Primary | ICD-10-CM

## 2022-06-22 LAB
ALBUMIN SERPL BCP-MCNC: 3.2 G/DL (ref 3.5–5)
ALBUMIN/GLOB SERPL: 0.9 {RATIO}
ALP SERPL-CCNC: 85 U/L (ref 50–130)
ALT SERPL W P-5'-P-CCNC: 8 U/L (ref 13–56)
AMYLASE SERPL-CCNC: 67 U/L (ref 25–115)
ANION GAP SERPL CALCULATED.3IONS-SCNC: 19 MMOL/L (ref 7–16)
AST SERPL W P-5'-P-CCNC: 11 U/L (ref 15–37)
BASOPHILS # BLD AUTO: 0.04 K/UL (ref 0–0.2)
BASOPHILS NFR BLD AUTO: 1.6 % (ref 0–1)
BASOPHILS NFR BLD MANUAL: 3 % (ref 0–1)
BILIRUB SERPL-MCNC: 0.5 MG/DL (ref 0–1.2)
BILIRUB UR QL STRIP: ABNORMAL
BUN SERPL-MCNC: 11 MG/DL (ref 7–18)
BUN/CREAT SERPL: 7 (ref 6–20)
CALCIUM SERPL-MCNC: 8.6 MG/DL (ref 8.5–10.1)
CHLORIDE SERPL-SCNC: 101 MMOL/L (ref 98–107)
CLARITY UR: ABNORMAL
CO2 SERPL-SCNC: 21 MMOL/L (ref 21–32)
COLOR UR: YELLOW
CREAT SERPL-MCNC: 1.59 MG/DL (ref 0.55–1.02)
DIFFERENTIAL METHOD BLD: ABNORMAL
EOSINOPHIL # BLD AUTO: 0.02 K/UL (ref 0–0.5)
EOSINOPHIL NFR BLD AUTO: 0.8 % (ref 1–4)
ERYTHROCYTE [DISTWIDTH] IN BLOOD BY AUTOMATED COUNT: 13.8 % (ref 11.5–14.5)
GLOBULIN SER-MCNC: 3.6 G/DL (ref 2–4)
GLUCOSE SERPL-MCNC: 125 MG/DL (ref 74–106)
GLUCOSE UR STRIP-MCNC: NEGATIVE MG/DL
HCT VFR BLD AUTO: 33.2 % (ref 38–47)
HGB BLD-MCNC: 11.2 G/DL (ref 12–16)
IMM GRANULOCYTES # BLD AUTO: 0.23 K/UL (ref 0–0.04)
IMM GRANULOCYTES NFR BLD: 9.2 % (ref 0–0.4)
KETONES UR STRIP-SCNC: NEGATIVE MG/DL
LEUKOCYTE ESTERASE UR QL STRIP: NEGATIVE
LIPASE SERPL-CCNC: 21 U/L (ref 73–393)
LYMPHOCYTES # BLD AUTO: 0.58 K/UL (ref 1–4.8)
LYMPHOCYTES NFR BLD AUTO: 23.1 % (ref 27–41)
LYMPHOCYTES NFR BLD MANUAL: 22 % (ref 27–41)
MCH RBC QN AUTO: 30.2 PG (ref 27–31)
MCHC RBC AUTO-ENTMCNC: 33.7 G/DL (ref 32–36)
MCV RBC AUTO: 89.5 FL (ref 80–96)
METAMYELOCYTES NFR BLD MANUAL: 19 %
MONOCYTES # BLD AUTO: 0.14 K/UL (ref 0–0.8)
MONOCYTES NFR BLD AUTO: 5.6 % (ref 2–6)
MONOCYTES NFR BLD MANUAL: 9 % (ref 2–6)
MPC BLD CALC-MCNC: 9.4 FL (ref 9.4–12.4)
MYELOCYTES NFR BLD MANUAL: 6 %
NEUTROPHILS # BLD AUTO: 1.5 K/UL (ref 1.8–7.7)
NEUTROPHILS NFR BLD AUTO: 59.7 % (ref 53–65)
NEUTS BAND NFR BLD MANUAL: 22 % (ref 1–5)
NEUTS SEG NFR BLD MANUAL: 19 % (ref 50–62)
NITRITE UR QL STRIP: NEGATIVE
NRBC # BLD AUTO: 0 X10E3/UL
NRBC, AUTO (.00): 0 %
PH UR STRIP: 5.5 PH UNITS
PLATELET # BLD AUTO: 216 K/UL (ref 150–400)
POTASSIUM SERPL-SCNC: 4.4 MMOL/L (ref 3.5–5.1)
PROT SERPL-MCNC: 6.8 G/DL (ref 6.4–8.2)
PROT UR QL STRIP: ABNORMAL
RBC # BLD AUTO: 3.71 M/UL (ref 4.2–5.4)
RBC # UR STRIP: NEGATIVE /UL
SODIUM SERPL-SCNC: 137 MMOL/L (ref 136–145)
SP GR UR STRIP: >=1.03
UROBILINOGEN UR STRIP-ACNC: 0.2 MG/DL
WBC # BLD AUTO: 2.51 K/UL (ref 4.5–11)

## 2022-06-22 PROCEDURE — 83690 ASSAY OF LIPASE: CPT | Performed by: EMERGENCY MEDICINE

## 2022-06-22 PROCEDURE — 96374 THER/PROPH/DIAG INJ IV PUSH: CPT

## 2022-06-22 PROCEDURE — 85025 COMPLETE CBC W/AUTO DIFF WBC: CPT | Performed by: EMERGENCY MEDICINE

## 2022-06-22 PROCEDURE — 63600175 PHARM REV CODE 636 W HCPCS: Performed by: EMERGENCY MEDICINE

## 2022-06-22 PROCEDURE — 96375 TX/PRO/DX INJ NEW DRUG ADDON: CPT

## 2022-06-22 PROCEDURE — 80053 COMPREHEN METABOLIC PANEL: CPT | Performed by: EMERGENCY MEDICINE

## 2022-06-22 PROCEDURE — 99285 PR EMERGENCY DEPT VISIT,LEVEL V: ICD-10-PCS | Mod: ,,, | Performed by: EMERGENCY MEDICINE

## 2022-06-22 PROCEDURE — 81003 URINALYSIS AUTO W/O SCOPE: CPT | Performed by: EMERGENCY MEDICINE

## 2022-06-22 PROCEDURE — 96361 HYDRATE IV INFUSION ADD-ON: CPT

## 2022-06-22 PROCEDURE — 36415 COLL VENOUS BLD VENIPUNCTURE: CPT | Performed by: EMERGENCY MEDICINE

## 2022-06-22 PROCEDURE — 82150 ASSAY OF AMYLASE: CPT | Performed by: EMERGENCY MEDICINE

## 2022-06-22 PROCEDURE — 99285 EMERGENCY DEPT VISIT HI MDM: CPT | Mod: 25

## 2022-06-22 PROCEDURE — 99285 EMERGENCY DEPT VISIT HI MDM: CPT | Mod: ,,, | Performed by: EMERGENCY MEDICINE

## 2022-06-22 RX ORDER — MORPHINE SULFATE 4 MG/ML
4 INJECTION, SOLUTION INTRAMUSCULAR; INTRAVENOUS
Status: COMPLETED | OUTPATIENT
Start: 2022-06-22 | End: 2022-06-22

## 2022-06-22 RX ORDER — SODIUM CHLORIDE, SODIUM LACTATE, POTASSIUM CHLORIDE, CALCIUM CHLORIDE 600; 310; 30; 20 MG/100ML; MG/100ML; MG/100ML; MG/100ML
1000 INJECTION, SOLUTION INTRAVENOUS
Status: COMPLETED | OUTPATIENT
Start: 2022-06-22 | End: 2022-06-22

## 2022-06-22 RX ORDER — ONDANSETRON 2 MG/ML
4 INJECTION INTRAMUSCULAR; INTRAVENOUS
Status: COMPLETED | OUTPATIENT
Start: 2022-06-22 | End: 2022-06-22

## 2022-06-22 RX ADMIN — ONDANSETRON 4 MG: 2 INJECTION INTRAMUSCULAR; INTRAVENOUS at 05:06

## 2022-06-22 RX ADMIN — SODIUM CHLORIDE, POTASSIUM CHLORIDE, SODIUM LACTATE AND CALCIUM CHLORIDE 1000 ML: 600; 310; 30; 20 INJECTION, SOLUTION INTRAVENOUS at 09:06

## 2022-06-22 RX ADMIN — SODIUM CHLORIDE, POTASSIUM CHLORIDE, SODIUM LACTATE AND CALCIUM CHLORIDE 1000 ML: 600; 310; 30; 20 INJECTION, SOLUTION INTRAVENOUS at 07:06

## 2022-06-22 RX ADMIN — MORPHINE SULFATE 4 MG: 4 INJECTION, SOLUTION INTRAMUSCULAR; INTRAVENOUS at 05:06

## 2022-06-22 NOTE — ED PROVIDER NOTES
Encounter Date: 6/22/2022    SCRIBE #1 NOTE: I, Lexi Gayatri, am scribing for, and in the presence of,  Jose Jeong. I have scribed the entire note.       History     Chief Complaint   Patient presents with    Abdominal Pain    Vomiting    Nausea     The patient is a 62 female with complaints of nausea, vomiting, and diarrhea. She reports it began after her liver transplant 2.5 months ago performed by  at [facility]. She states over the last 3 days it has gotten significantly more severe and has contacted the transplant team about the issue. She also reports her umbilical hernia has grown in size and the site around the transplant incision is painful. The patient is prescribed zofran and phenergan but denies any relief from either. She denies any other complaints or issues.     The history is provided by the patient. No  was used.     Review of patient's allergies indicates:  No Known Allergies  Past Medical History:   Diagnosis Date    Abnormal uterine bleeding (AUB) 4/28/2021    Elevated serum creatinine 7/27/2021    Rechecked at 2.1 following completion of treatment for Hep C.    Hepatitis C      Past Surgical History:   Procedure Laterality Date    CHOLECYSTECTOMY      LIVER TRANSPLANT      ROBOT-ASSISTED LAPAROSCOPIC HYSTERECTOMY N/A 04/29/2021    Procedure: ROBOTIC HYSTERECTOMY;  Surgeon: Swapna Sow DO;  Location: [facility] OR;  Service: OB/GYN;  Laterality: N/A;    ROBOT-ASSISTED LAPAROSCOPIC SALPINGO-OOPHORECTOMY Bilateral 04/29/2021    Procedure: ROBOTIC SALPINGO-OOPHORECTOMY;  Surgeon: Swapna Sow DO;  Location: [facility] OR;  Service: OB/GYN;  Laterality: Bilateral;    TONSILLECTOMY       Family History   Problem Relation Age of Onset    Heart disease Father     No Known Problems Mother      Social History     Tobacco Use    Smoking status: Never Smoker    Smokeless tobacco: Never Used   Substance Use Topics    Alcohol use: Not Currently    Drug use:  Not Currently     Review of Systems   Constitutional: Negative for fever.   Gastrointestinal: Positive for abdominal pain, diarrhea, nausea and vomiting.   All other systems reviewed and are negative.      Physical Exam     Initial Vitals [06/22/22 1554]   BP Pulse Resp Temp SpO2   (!) 143/90 101 20 98.4 °F (36.9 °C) 100 %      MAP       --         Physical Exam    Constitutional: She appears well-developed and well-nourished.   HENT:   Head: Normocephalic and atraumatic.   Eyes: Conjunctivae and EOM are normal. Pupils are equal, round, and reactive to light.   Neck: Neck supple.   Normal range of motion.  Cardiovascular: Normal rate and regular rhythm.   Pulmonary/Chest: Breath sounds normal.   Abdominal: Abdomen is soft. A hernia (Easily reduced) is present. Hernia confirmed positive in the umbilical area.   Musculoskeletal:         General: Normal range of motion.      Cervical back: Normal range of motion and neck supple.     Neurological: She is alert and oriented to person, place, and time.   Skin: Skin is warm and dry. Capillary refill takes less than 2 seconds.   Psychiatric: She has a normal mood and affect. Thought content normal.         ED Course   Procedures  Labs Reviewed   COMPREHENSIVE METABOLIC PANEL - Abnormal; Notable for the following components:       Result Value    Anion Gap 19 (*)     Glucose 125 (*)     Creatinine 1.59 (*)     Albumin 3.2 (*)     ALT 8 (*)     AST 11 (*)     eGFR 35 (*)     All other components within normal limits   LIPASE - Abnormal; Notable for the following components:    Lipase 21 (*)     All other components within normal limits   URINALYSIS, REFLEX TO URINE CULTURE - Abnormal; Notable for the following components:    Clarity, UA Slightly Cloudy (*)     Protein, UA Trace (*)     Bilirubin, UA Small (*)     Specific Gravity, UA >=1.030 (*)     All other components within normal limits   CBC WITH DIFFERENTIAL - Abnormal; Notable for the following components:    WBC 2.51  (*)     RBC 3.71 (*)     Hemoglobin 11.2 (*)     Hematocrit 33.2 (*)     Lymphocytes % 23.1 (*)     Eosinophils % 0.8 (*)     Basophils % 1.6 (*)     Immature Granulocytes % 9.2 (*)     Neutrophils, Abs 1.50 (*)     Lymphocytes, Absolute 0.58 (*)     Immature Granulocytes, Absolute 0.23 (*)     All other components within normal limits   MANUAL DIFFERENTIAL - Abnormal; Notable for the following components:    Segmented Neutrophils, Man % 19 (*)     Bands, Man % 22 (*)     Lymphocytes, Man % 22 (*)     Monocytes, Man % 9 (*)     Basophils, Man % 3 (*)     All other components within normal limits   AMYLASE - Normal   CBC W/ AUTO DIFFERENTIAL    Narrative:     The following orders were created for panel order CBC auto differential.  Procedure                               Abnormality         Status                     ---------                               -----------         ------                     CBC with Differential[[id number]]        Abnormal            Final result               Manual Differential[[id number]]          Abnormal            Final result                 Please view results for these tests on the individual orders.          Imaging Results          CT Abdomen Pelvis  Without Contrast (Final result)  Result time 06/22/22 20:09:27    Final result by Tiffanie Thornton MD (06/22/22 20:09:27)                 Impression:      1. Large amount of ascites.  2. Postoperative changes and other findings detailed above  This CT exam was performed using one or more of the following dose reduction techniques: Automated exposure control, adjustment of the mA and/or kV according to patient's size, or use of iterative reconstruction technique.      Electronically signed by: Tiffanie Thornton  Date:    06/22/2022  Time:    20:09             Narrative:    EXAMINATION:  CT ABDOMEN PELVIS WITHOUT CONTRAST    CLINICAL HISTORY:  Abdominal pain, acute, nonlocalized;    FINDINGS:  Comparison 01/28/2021    There is fluid within a  hiatal hernia.  Minimal pericardial thickening or fluid present    There is a large amount of ascites present.  There are clips in the right upper abdomen.  Lack of oral or intravenous contrast limits evaluation.  Appearance of the structures of the tami hepatis similar on the prior study.  Spleen is upper range normal in size    No renal calcifications or secondary signs of acute ureteral obstruction seen    No enlarged retroperitoneal nodes seen    Pelvis:    Fluid present within a a 4 cm umbilical region hernia.  Small amount of fluid present in the medial right inguinal hernia.  Large amount of ascites present.  No focal inflammatory changes seen.  Unopacified bowel felt to be present in the right upper pelvis.  Appendix not definitively seen.                               XR ABDOMEN, ACUTE 2 OR MORE VIEWS WITH CHEST (Final result)  Result time 06/22/22 18:16:36    Final result by Tiffanie Thornton MD (06/22/22 18:16:36)                 Impression:      Tiny scattered nonspecific air-fluid levels could be gastroenteritis or mild ileus however no significant air is seen in the colon.  If symptoms persist, follow-up film is recommended      Electronically signed by: Tiffanie Thornton  Date:    06/22/2022  Time:    18:16             Narrative:    EXAMINATION:  XR ABDOMEN ACUTE 2 OR MORE VIEWS WITH CHEST    CLINICAL HISTORY:  abdominal pain;    FINDINGS:  Comparison chest of 04/28/2021 and abdomen of 04/12/2021    Heart is normal in size.  No confluent infiltrates are seen.    Air-filled loops of small bowel measure up to 1.8 cm with tiny scattered air-fluid levels.  No significant air present in the large bowel.    There are clips in the upper abdomen.  No free air identified                                 Medications   morphine injection 4 mg (4 mg Intravenous Given 6/22/22 1725)   ondansetron injection 4 mg (4 mg Intravenous Given 6/22/22 1724)   lactated ringers bolus 1,000 mL (0 mLs Intravenous Stopped 6/22/22 2043)    lactated ringers infusion (1,000 mLs Intravenous New Bag 6/22/22 4609)     Medical Decision Making:   Other:   I have discussed this case with another health care provider.       <> Summary of the Discussion:  consulted  at [facility].  requested the patient be sent to [facility] and was accepted by  in the [facility] ED            Attending Attestation:           Physician Attestation for Scribe:  Physician Attestation Statement for Scribe #1: I, Jose Jeong, reviewed documentation, as scribed by Lexi Mendieta in my presence, and it is both accurate and complete.                      Clinical Impression:   Final diagnoses:  [K65.2] Spontaneous bacterial peritonitis (Primary)          ED Disposition Condition    Transfer to Another Facility Stable              Jordi Bhatia MD  06/23/22 0025

## 2022-06-22 NOTE — ED TRIAGE NOTES
Had liver transplant at [facility] by Dr Benavides 2 1/2 months ago. Has continued to lose weight from N/V/D every since. Dr benavides aware. Has zofran and phenergan but hasnt helped. Her umbilical hernia is getting larger as well.

## 2022-06-23 ENCOUNTER — TELEPHONE (OUTPATIENT)
Dept: EMERGENCY MEDICINE | Facility: HOSPITAL | Age: 62
End: 2022-06-23
Payer: COMMERCIAL

## 2022-08-07 ENCOUNTER — PATIENT MESSAGE (OUTPATIENT)
Dept: GASTROENTEROLOGY | Facility: CLINIC | Age: 62
End: 2022-08-07
Payer: COMMERCIAL

## 2023-03-22 ENCOUNTER — OFFICE VISIT (OUTPATIENT)
Dept: FAMILY MEDICINE | Facility: CLINIC | Age: 63
End: 2023-03-22
Payer: COMMERCIAL

## 2023-03-22 VITALS
DIASTOLIC BLOOD PRESSURE: 94 MMHG | BODY MASS INDEX: 23.9 KG/M2 | RESPIRATION RATE: 17 BRPM | SYSTOLIC BLOOD PRESSURE: 160 MMHG | WEIGHT: 140 LBS | OXYGEN SATURATION: 99 % | HEIGHT: 64 IN | HEART RATE: 77 BPM | TEMPERATURE: 99 F

## 2023-03-22 DIAGNOSIS — R63.5 WEIGHT GAIN: ICD-10-CM

## 2023-03-22 DIAGNOSIS — R60.0 EDEMA OF BOTH LOWER LEGS: Primary | ICD-10-CM

## 2023-03-22 PROCEDURE — 99203 OFFICE O/P NEW LOW 30 MIN: CPT | Mod: ,,, | Performed by: FAMILY MEDICINE

## 2023-03-22 PROCEDURE — 3080F DIAST BP >= 90 MM HG: CPT | Mod: CPTII,,, | Performed by: FAMILY MEDICINE

## 2023-03-22 PROCEDURE — 3008F PR BODY MASS INDEX (BMI) DOCUMENTED: ICD-10-PCS | Mod: CPTII,,, | Performed by: FAMILY MEDICINE

## 2023-03-22 PROCEDURE — 99203 PR OFFICE/OUTPT VISIT, NEW, LEVL III, 30-44 MIN: ICD-10-PCS | Mod: ,,, | Performed by: FAMILY MEDICINE

## 2023-03-22 PROCEDURE — 3077F SYST BP >= 140 MM HG: CPT | Mod: CPTII,,, | Performed by: FAMILY MEDICINE

## 2023-03-22 PROCEDURE — 3080F PR MOST RECENT DIASTOLIC BLOOD PRESSURE >= 90 MM HG: ICD-10-PCS | Mod: CPTII,,, | Performed by: FAMILY MEDICINE

## 2023-03-22 PROCEDURE — 3008F BODY MASS INDEX DOCD: CPT | Mod: CPTII,,, | Performed by: FAMILY MEDICINE

## 2023-03-22 PROCEDURE — 3077F PR MOST RECENT SYSTOLIC BLOOD PRESSURE >= 140 MM HG: ICD-10-PCS | Mod: CPTII,,, | Performed by: FAMILY MEDICINE

## 2023-03-22 PROCEDURE — 1159F PR MEDICATION LIST DOCUMENTED IN MEDICAL RECORD: ICD-10-PCS | Mod: CPTII,,, | Performed by: FAMILY MEDICINE

## 2023-03-22 PROCEDURE — 1159F MED LIST DOCD IN RCRD: CPT | Mod: CPTII,,, | Performed by: FAMILY MEDICINE

## 2023-03-22 RX ORDER — NIFEDIPINE 30 MG/1
30 TABLET, EXTENDED RELEASE ORAL
COMMUNITY
Start: 2023-03-18 | End: 2023-11-16 | Stop reason: SDUPTHER

## 2023-03-22 RX ORDER — PANTOPRAZOLE SODIUM 40 MG/1
40 TABLET, DELAYED RELEASE ORAL
COMMUNITY
Start: 2023-03-08

## 2023-03-22 RX ORDER — ASPIRIN 81 MG/1
81 TABLET ORAL
COMMUNITY
Start: 2023-03-08

## 2023-03-22 RX ORDER — TACROLIMUS 1 MG/1
CAPSULE ORAL
COMMUNITY
Start: 2023-03-08

## 2023-03-22 NOTE — PROGRESS NOTES
Subjective:       Patient ID: Nadia Salas is a 62 y.o. female.    Chief Complaint: Leg Swelling (Bilateral leg swelling and burning sensation for a month. Worsen in the last couple of wks. Recently had liver transplant. Gain 7 pounds in a wk. )    Edema began soon after starting nifedipine for hypertension she is had no shortness of breath orthopnea or PND.  Very active.  She walks on a treadmill regularly.    Review of Systems      Objective:      Physical Exam  Constitutional:       General: She is not in acute distress.     Appearance: Normal appearance. She is not ill-appearing (amazingly normal appearance given her history).   Cardiovascular:      Rate and Rhythm: Normal rate and regular rhythm.   Pulmonary:      Effort: Pulmonary effort is normal.      Breath sounds: Normal breath sounds.   Abdominal:      General: Abdomen is flat. Bowel sounds are normal. There is no distension.      Palpations: Abdomen is soft.      Tenderness: There is no abdominal tenderness.   Musculoskeletal:      Right lower leg: Edema present.      Left lower leg: Edema (1+ edema both lower legs.  No calf tenderness.  No evidence of DVT) present.   Neurological:      Mental Status: She is alert.       Assessment:       Problem List Items Addressed This Visit    None  Visit Diagnoses       Edema of both lower legs    -  Primary            Plan:       Mild edema very likely due to the nifedipine.  She has had no dyspnea orthopnea or PND.  Only apparent explanation for her weight gain.  No evidence that she has ascites.  No treatment.  If the edema worsens she will contact her transplant clinic      Weight gain may be due to mild ascites.  If he continues she will contact her transplant clinic

## 2023-06-30 ENCOUNTER — OFFICE VISIT (OUTPATIENT)
Dept: FAMILY MEDICINE | Facility: CLINIC | Age: 63
End: 2023-06-30
Payer: COMMERCIAL

## 2023-06-30 VITALS
RESPIRATION RATE: 18 BRPM | HEART RATE: 101 BPM | TEMPERATURE: 98 F | WEIGHT: 146 LBS | BODY MASS INDEX: 24.92 KG/M2 | SYSTOLIC BLOOD PRESSURE: 140 MMHG | DIASTOLIC BLOOD PRESSURE: 85 MMHG | HEIGHT: 64 IN | OXYGEN SATURATION: 99 %

## 2023-06-30 DIAGNOSIS — Z20.828 VIRAL DISEASE EXPOSURE: ICD-10-CM

## 2023-06-30 DIAGNOSIS — J40 BRONCHITIS: Primary | ICD-10-CM

## 2023-06-30 DIAGNOSIS — Z94.4 LIVER TRANSPLANT RECIPIENT: ICD-10-CM

## 2023-06-30 DIAGNOSIS — N18.9 CHRONIC KIDNEY DISEASE, UNSPECIFIED CKD STAGE: ICD-10-CM

## 2023-06-30 PROBLEM — Z86.19 PERSONAL HISTORY OF OTHER INFECTIOUS AND PARASITIC DISEASES: Status: ACTIVE | Noted: 2022-04-04

## 2023-06-30 PROBLEM — B25.9 CMV (CYTOMEGALOVIRUS INFECTION) STATUS POSITIVE: Status: ACTIVE | Noted: 2022-04-05

## 2023-06-30 PROBLEM — M89.8X5 LYTIC BONE LESION OF HIP: Status: ACTIVE | Noted: 2021-12-10

## 2023-06-30 PROBLEM — G89.18 POST-OP PAIN: Status: ACTIVE | Noted: 2022-04-06

## 2023-06-30 PROBLEM — Z85.42 HISTORY OF ENDOMETRIAL CANCER: Status: ACTIVE | Noted: 2021-12-10

## 2023-06-30 PROBLEM — R10.11 RIGHT UPPER QUADRANT ABDOMINAL PAIN: Status: ACTIVE | Noted: 2022-06-23

## 2023-06-30 PROBLEM — K42.9 UMBILICAL HERNIA WITHOUT OBSTRUCTION AND WITHOUT GANGRENE: Status: ACTIVE | Noted: 2022-12-12

## 2023-06-30 PROBLEM — K76.6 PORTAL HYPERTENSION: Status: ACTIVE | Noted: 2022-03-15

## 2023-06-30 PROBLEM — R11.2 NAUSEA VOMITING AND DIARRHEA: Status: ACTIVE | Noted: 2022-06-23

## 2023-06-30 PROBLEM — I12.9 HYPERTENSIVE CHRONIC KIDNEY DISEASE W STG 1-4/UNSP CHR KDNY: Status: ACTIVE | Noted: 2022-01-01

## 2023-06-30 PROBLEM — N18.32 CHRONIC KIDNEY DISEASE, STAGE 3B: Status: ACTIVE | Noted: 2022-04-05

## 2023-06-30 PROBLEM — R19.7 NAUSEA VOMITING AND DIARRHEA: Status: ACTIVE | Noted: 2022-06-23

## 2023-06-30 PROBLEM — M54.16 LUMBAR RADICULAR PAIN: Status: ACTIVE | Noted: 2022-04-26

## 2023-06-30 PROBLEM — E87.1 HYPONATREMIA: Status: ACTIVE | Noted: 2021-12-10

## 2023-06-30 PROBLEM — Z85.42 HISTORY OF UTERINE CANCER: Status: ACTIVE | Noted: 2021-01-01

## 2023-06-30 PROBLEM — Z79.2 PROPHYLACTIC ANTIBIOTIC: Status: ACTIVE | Noted: 2022-04-06

## 2023-06-30 PROBLEM — Z79.899 ENCOUNTER FOR LONG-TERM (CURRENT) USE OF HIGH-RISK MEDICATION: Status: ACTIVE | Noted: 2021-04-28

## 2023-06-30 PROBLEM — C54.1 ENDOMETRIAL ADENOCARCINOMA: Status: ACTIVE | Noted: 2021-07-12

## 2023-06-30 PROBLEM — N17.1 ACUTE RENAL FAILURE WITH ACUTE CORTICAL NECROSIS: Status: ACTIVE | Noted: 2022-04-05

## 2023-06-30 PROBLEM — D72.819 LEUKOPENIA: Status: ACTIVE | Noted: 2022-06-23

## 2023-06-30 LAB
CTP QC/QA: YES
CTP QC/QA: YES
FLUAV AG NPH QL: NEGATIVE
FLUBV AG NPH QL: NEGATIVE
S PYO RRNA THROAT QL PROBE: NEGATIVE
SARS-COV-2 AG RESP QL IA.RAPID: NEGATIVE

## 2023-06-30 PROCEDURE — 1160F RVW MEDS BY RX/DR IN RCRD: CPT | Mod: CPTII,,, | Performed by: NURSE PRACTITIONER

## 2023-06-30 PROCEDURE — 1160F PR REVIEW ALL MEDS BY PRESCRIBER/CLIN PHARMACIST DOCUMENTED: ICD-10-PCS | Mod: CPTII,,, | Performed by: NURSE PRACTITIONER

## 2023-06-30 PROCEDURE — 87880 POCT RAPID STREP A: ICD-10-PCS | Mod: QW,,, | Performed by: NURSE PRACTITIONER

## 2023-06-30 PROCEDURE — 87428 POCT SARS-COV2 (COVID) WITH FLU ANTIGEN: ICD-10-PCS | Mod: QW,,, | Performed by: NURSE PRACTITIONER

## 2023-06-30 PROCEDURE — 3079F DIAST BP 80-89 MM HG: CPT | Mod: CPTII,,, | Performed by: NURSE PRACTITIONER

## 2023-06-30 PROCEDURE — 3077F SYST BP >= 140 MM HG: CPT | Mod: CPTII,,, | Performed by: NURSE PRACTITIONER

## 2023-06-30 PROCEDURE — 1159F MED LIST DOCD IN RCRD: CPT | Mod: CPTII,,, | Performed by: NURSE PRACTITIONER

## 2023-06-30 PROCEDURE — 3077F PR MOST RECENT SYSTOLIC BLOOD PRESSURE >= 140 MM HG: ICD-10-PCS | Mod: CPTII,,, | Performed by: NURSE PRACTITIONER

## 2023-06-30 PROCEDURE — 87428 SARSCOV & INF VIR A&B AG IA: CPT | Mod: QW,,, | Performed by: NURSE PRACTITIONER

## 2023-06-30 PROCEDURE — 99213 PR OFFICE/OUTPT VISIT, EST, LEVL III, 20-29 MIN: ICD-10-PCS | Mod: ,,, | Performed by: NURSE PRACTITIONER

## 2023-06-30 PROCEDURE — 1159F PR MEDICATION LIST DOCUMENTED IN MEDICAL RECORD: ICD-10-PCS | Mod: CPTII,,, | Performed by: NURSE PRACTITIONER

## 2023-06-30 PROCEDURE — 3008F PR BODY MASS INDEX (BMI) DOCUMENTED: ICD-10-PCS | Mod: CPTII,,, | Performed by: NURSE PRACTITIONER

## 2023-06-30 PROCEDURE — 3008F BODY MASS INDEX DOCD: CPT | Mod: CPTII,,, | Performed by: NURSE PRACTITIONER

## 2023-06-30 PROCEDURE — 3079F PR MOST RECENT DIASTOLIC BLOOD PRESSURE 80-89 MM HG: ICD-10-PCS | Mod: CPTII,,, | Performed by: NURSE PRACTITIONER

## 2023-06-30 PROCEDURE — 87880 STREP A ASSAY W/OPTIC: CPT | Mod: QW,,, | Performed by: NURSE PRACTITIONER

## 2023-06-30 PROCEDURE — 99213 OFFICE O/P EST LOW 20 MIN: CPT | Mod: ,,, | Performed by: NURSE PRACTITIONER

## 2023-06-30 RX ORDER — CEFDINIR 300 MG/1
300 CAPSULE ORAL 2 TIMES DAILY
Qty: 20 CAPSULE | Refills: 0 | Status: SHIPPED | OUTPATIENT
Start: 2023-06-30 | End: 2023-07-10

## 2023-06-30 RX ORDER — PROMETHAZINE HYDROCHLORIDE AND DEXTROMETHORPHAN HYDROBROMIDE 6.25; 15 MG/5ML; MG/5ML
5 SYRUP ORAL EVERY 6 HOURS PRN
Qty: 150 ML | Refills: 0 | Status: SHIPPED | OUTPATIENT
Start: 2023-06-30 | End: 2023-07-10

## 2023-06-30 RX ORDER — MYCOPHENOLIC ACID 180 MG/1
180 TABLET, DELAYED RELEASE ORAL 2 TIMES DAILY
COMMUNITY
Start: 2023-02-02 | End: 2023-11-16

## 2023-06-30 RX ORDER — ONDANSETRON 4 MG/1
TABLET, ORALLY DISINTEGRATING ORAL
COMMUNITY
Start: 2023-06-30

## 2023-06-30 NOTE — PROGRESS NOTES
"Subjective:       Patient ID: Nadia Salas is a 63 y.o. female.    Chief Complaint: Cough (Non productive. Pt states that she just recently cam back from [place]. ), Nasal Congestion (Onset 3 days ago. ), Chest Congestion (Onset 3 days ago. ), Generalized Body Aches (Onset 3 days ago. ), and Sore Throat (Onset 3 days ago. )    Presents to clinic as above. No fever. Hx of kidney disease and has had a liver transplant. I looked in epic and liver enzymes have been normal. Kidney function stable. No SOB.     Review of Systems   HENT:  Positive for congestion and sore throat. Negative for ear pain.    Respiratory:  Positive for cough and sputum production.    Musculoskeletal: Negative.    Neurological:  Positive for headaches.        Reviewed family, medical, surgical, and social history.    Objective:      BP (!) 140/85 (BP Location: Left arm, Patient Position: Sitting, BP Method: Large (Automatic))   Pulse 101   Temp 98.2 °F (36.8 °C) (Oral)   Resp 18   Ht 5' 4" (1.626 m)   Wt 66.2 kg (146 lb)   SpO2 99%   BMI 25.06 kg/m²   Physical Exam  Vitals and nursing note reviewed.   Constitutional:       General: She is not in acute distress.     Appearance: Normal appearance. She is normal weight. She is not ill-appearing, toxic-appearing or diaphoretic.   HENT:      Head: Normocephalic.      Right Ear: Hearing, tympanic membrane, ear canal and external ear normal.      Left Ear: Hearing, tympanic membrane, ear canal and external ear normal.      Nose: Mucosal edema, congestion and rhinorrhea present. Rhinorrhea is clear.      Right Turbinates: Enlarged and swollen.      Left Turbinates: Enlarged and swollen.      Right Sinus: No maxillary sinus tenderness or frontal sinus tenderness.      Left Sinus: No maxillary sinus tenderness or frontal sinus tenderness.      Mouth/Throat:      Lips: Pink.      Mouth: Mucous membranes are moist.      Pharynx: Uvula midline. Posterior oropharyngeal erythema present. No " pharyngeal swelling, oropharyngeal exudate or uvula swelling.      Tonsils: No tonsillar exudate or tonsillar abscesses.   Cardiovascular:      Rate and Rhythm: Normal rate and regular rhythm.      Heart sounds: Normal heart sounds.   Pulmonary:      Effort: Pulmonary effort is normal. No respiratory distress.      Breath sounds: No stridor. Rhonchi present. No wheezing or rales.   Chest:      Chest wall: No tenderness.   Musculoskeletal:      Cervical back: Normal range of motion and neck supple. No rigidity or tenderness.   Lymphadenopathy:      Cervical: No cervical adenopathy.   Skin:     General: Skin is warm and dry.   Neurological:      Mental Status: She is alert.   Psychiatric:         Mood and Affect: Mood normal.         Behavior: Behavior normal.         Thought Content: Thought content normal.         Judgment: Judgment normal.          Office Visit on 06/30/2023   Component Date Value Ref Range Status    Rapid Strep A Screen 06/30/2023 Negative  Negative Final     Acceptable 06/30/2023 Yes   Final    SARS Coronavirus 2 Antigen 06/30/2023 Negative  Negative Final    Rapid Influenza A Ag 06/30/2023 Negative  Negative Final    Rapid Influenza B Ag 06/30/2023 Negative  Negative Final     Acceptable 06/30/2023 Yes   Final      Assessment:       1. Bronchitis    2. Viral disease exposure    3. Chronic kidney disease, unspecified CKD stage    4. Liver transplant recipient        Plan:       Bronchitis  -     promethazine-dextromethorphan (PROMETHAZINE-DM) 6.25-15 mg/5 mL Syrp; Take 5 mLs by mouth every 6 (six) hours as needed (cough).  Dispense: 150 mL; Refill: 0  -     cefdinir (OMNICEF) 300 MG capsule; Take 1 capsule (300 mg total) by mouth 2 (two) times daily. for 10 days  Dispense: 20 capsule; Refill: 0    Viral disease exposure  -     POCT rapid strep A  -     POCT SARS-COV2 (COVID) with Flu Antigen    Chronic kidney disease, unspecified CKD stage    Liver transplant  recipient    Meds checked in Uptodate for renal and hepatic dosing.   RTC PRN          Risks, benefits, and side effects were discussed with the patient. All questions were answered to the fullest satisfaction of the patient, and pt verbalized understanding and agreement to treatment plan. Pt was to call with any new or worsening symptoms, or present to the ER.

## 2023-07-18 ENCOUNTER — OFFICE VISIT (OUTPATIENT)
Dept: FAMILY MEDICINE | Facility: CLINIC | Age: 63
End: 2023-07-18
Payer: COMMERCIAL

## 2023-07-18 VITALS
WEIGHT: 147.81 LBS | BODY MASS INDEX: 25.24 KG/M2 | OXYGEN SATURATION: 100 % | DIASTOLIC BLOOD PRESSURE: 75 MMHG | RESPIRATION RATE: 18 BRPM | SYSTOLIC BLOOD PRESSURE: 126 MMHG | HEART RATE: 84 BPM | TEMPERATURE: 98 F | HEIGHT: 64 IN

## 2023-07-18 DIAGNOSIS — R06.2 WHEEZING: ICD-10-CM

## 2023-07-18 DIAGNOSIS — J20.9 ACUTE BRONCHITIS, UNSPECIFIED ORGANISM: Primary | ICD-10-CM

## 2023-07-18 DIAGNOSIS — R05.1 ACUTE COUGH: ICD-10-CM

## 2023-07-18 PROCEDURE — 99213 OFFICE O/P EST LOW 20 MIN: CPT | Mod: ,,, | Performed by: NURSE PRACTITIONER

## 2023-07-18 PROCEDURE — 1159F MED LIST DOCD IN RCRD: CPT | Mod: CPTII,,, | Performed by: NURSE PRACTITIONER

## 2023-07-18 PROCEDURE — 3008F PR BODY MASS INDEX (BMI) DOCUMENTED: ICD-10-PCS | Mod: CPTII,,, | Performed by: NURSE PRACTITIONER

## 2023-07-18 PROCEDURE — 3074F SYST BP LT 130 MM HG: CPT | Mod: CPTII,,, | Performed by: NURSE PRACTITIONER

## 2023-07-18 PROCEDURE — 99213 PR OFFICE/OUTPT VISIT, EST, LEVL III, 20-29 MIN: ICD-10-PCS | Mod: ,,, | Performed by: NURSE PRACTITIONER

## 2023-07-18 PROCEDURE — 3078F PR MOST RECENT DIASTOLIC BLOOD PRESSURE < 80 MM HG: ICD-10-PCS | Mod: CPTII,,, | Performed by: NURSE PRACTITIONER

## 2023-07-18 PROCEDURE — 3008F BODY MASS INDEX DOCD: CPT | Mod: CPTII,,, | Performed by: NURSE PRACTITIONER

## 2023-07-18 PROCEDURE — 1159F PR MEDICATION LIST DOCUMENTED IN MEDICAL RECORD: ICD-10-PCS | Mod: CPTII,,, | Performed by: NURSE PRACTITIONER

## 2023-07-18 PROCEDURE — 3078F DIAST BP <80 MM HG: CPT | Mod: CPTII,,, | Performed by: NURSE PRACTITIONER

## 2023-07-18 PROCEDURE — 3074F PR MOST RECENT SYSTOLIC BLOOD PRESSURE < 130 MM HG: ICD-10-PCS | Mod: CPTII,,, | Performed by: NURSE PRACTITIONER

## 2023-07-18 RX ORDER — PROMETHAZINE HYDROCHLORIDE AND DEXTROMETHORPHAN HYDROBROMIDE 6.25; 15 MG/5ML; MG/5ML
5 SYRUP ORAL EVERY 6 HOURS PRN
Qty: 240 ML | Refills: 0 | Status: SHIPPED | OUTPATIENT
Start: 2023-07-18 | End: 2023-07-28

## 2023-07-18 RX ORDER — PREDNISONE 20 MG/1
20 TABLET ORAL DAILY
Qty: 5 TABLET | Refills: 0 | Status: SHIPPED | OUTPATIENT
Start: 2023-07-18 | End: 2023-11-16

## 2023-07-18 RX ORDER — AZITHROMYCIN 250 MG/1
250 TABLET, FILM COATED ORAL DAILY
Qty: 6 TABLET | Refills: 0 | Status: SHIPPED | OUTPATIENT
Start: 2023-07-18 | End: 2023-07-23

## 2023-07-18 RX ORDER — ALBUTEROL SULFATE 90 UG/1
2 AEROSOL, METERED RESPIRATORY (INHALATION) EVERY 6 HOURS PRN
Qty: 18 G | Refills: 0 | Status: SHIPPED | OUTPATIENT
Start: 2023-07-18

## 2023-07-18 NOTE — PROGRESS NOTES
Subjective:       Patient ID: Nadia Salas is a 63 y.o. female.    Chief Complaint: Other (Pt was dx with bronchitis on 6/30. Is still not feeling better,pt states that she has finished all meds. ), Back Pain (Lower back pain , possible pulled muscle from coughing so much. ), and Conjunctivitis (Right eye redness, onset yesterday. Pt denies any pain, drainage, or blurred vision. )    Cough and chest congestion  Low back pain- states she pulled a muscle coughing  Right eye conjunctival hemorrhage from coughing- no injury- no issue with vision or drainage    Back Pain  Pertinent negatives include no abdominal pain, chest pain, fever, headaches or weakness.   Conjunctivitis  Associated symptoms include coughing. Pertinent negatives include no abdominal pain, change in bowel habit, chest pain, congestion, fatigue, fever, headaches, nausea, neck pain, rash, sore throat, vomiting or weakness.   Review of Systems   Constitutional:  Negative for appetite change, fatigue and fever.   HENT:  Negative for nasal congestion, ear pain and sore throat.    Eyes:  Positive for redness. Negative for photophobia, pain, discharge, itching, visual disturbance and eye dryness.   Respiratory:  Positive for cough. Negative for shortness of breath.    Cardiovascular:  Negative for chest pain and leg swelling.   Gastrointestinal:  Negative for abdominal pain, change in bowel habit, nausea, vomiting and change in bowel habit.   Musculoskeletal:  Positive for back pain. Negative for gait problem and neck pain.   Integumentary:  Negative for rash and wound.   Allergic/Immunologic: Negative for immunocompromised state.   Neurological:  Negative for dizziness, weakness and headaches.   All other systems reviewed and are negative.      Objective:      Physical Exam  Vitals and nursing note reviewed.   Constitutional:       General: She is not in acute distress.     Appearance: Normal appearance. She is not ill-appearing, toxic-appearing or  diaphoretic.   HENT:      Head: Normocephalic.      Right Ear: Tympanic membrane, ear canal and external ear normal.      Left Ear: Tympanic membrane, ear canal and external ear normal.      Nose: Nose normal. No congestion or rhinorrhea.      Mouth/Throat:      Mouth: Mucous membranes are moist.      Pharynx: No oropharyngeal exudate or posterior oropharyngeal erythema.   Eyes:      General: No scleral icterus.        Right eye: No discharge.         Left eye: No discharge.      Extraocular Movements: Extraocular movements intact.      Conjunctiva/sclera:      Right eye: Hemorrhage present. No exudate.     Pupils: Pupils are equal, round, and reactive to light.   Cardiovascular:      Rate and Rhythm: Normal rate and regular rhythm.      Pulses: Normal pulses.      Heart sounds: Normal heart sounds. No murmur heard.  Pulmonary:      Effort: Pulmonary effort is normal. No respiratory distress.      Breath sounds: Wheezing present. No rhonchi or rales.   Musculoskeletal:         General: Normal range of motion.      Cervical back: Neck supple. No tenderness.   Lymphadenopathy:      Cervical: No cervical adenopathy.   Skin:     General: Skin is warm and dry.      Capillary Refill: Capillary refill takes less than 2 seconds.      Findings: No rash.   Neurological:      Mental Status: She is alert and oriented to person, place, and time.   Psychiatric:         Mood and Affect: Mood normal.         Behavior: Behavior normal.         Thought Content: Thought content normal.         Judgment: Judgment normal.          Assessment:       1. Acute bronchitis, unspecified organism    2. Acute cough    3. Wheezing        Plan:   Acute bronchitis, unspecified organism  -     azithromycin (ZITHROMAX Z-WHITNEY) 250 MG tablet; Take 1 tablet (250 mg total) by mouth once daily. Take 2 tablets on day 1 and then take 1 tablet days 2-5 for 5 days  Dispense: 6 tablet; Refill: 0  -     predniSONE (DELTASONE) 20 MG tablet; Take 1 tablet (20 mg  total) by mouth once daily.  Dispense: 5 tablet; Refill: 0  -     albuterol (VENTOLIN HFA) 90 mcg/actuation inhaler; Inhale 2 puffs into the lungs every 6 (six) hours as needed for Wheezing. Rescue  Dispense: 18 g; Refill: 0    Acute cough  -     X-Ray Chest PA And Lateral; Future; Expected date: 07/18/2023  -     albuterol (VENTOLIN HFA) 90 mcg/actuation inhaler; Inhale 2 puffs into the lungs every 6 (six) hours as needed for Wheezing. Rescue  Dispense: 18 g; Refill: 0  -     promethazine-dextromethorphan (PROMETHAZINE-DM) 6.25-15 mg/5 mL Syrp; Take 5 mLs by mouth every 6 (six) hours as needed (cough).  Dispense: 240 mL; Refill: 0    Wheezing  -     predniSONE (DELTASONE) 20 MG tablet; Take 1 tablet (20 mg total) by mouth once daily.  Dispense: 5 tablet; Refill: 0  -     albuterol (VENTOLIN HFA) 90 mcg/actuation inhaler; Inhale 2 puffs into the lungs every 6 (six) hours as needed for Wheezing. Rescue  Dispense: 18 g; Refill: 0         Risks, benefits, and side effects were discussed with the patient. All questions were answered to the fullest satisfaction of the patient, and pt verbalized understanding and agreement to treatment plan. Pt was to call with any new or worsening symptoms, or present to the ER

## 2023-10-02 PROBLEM — N17.1 ACUTE RENAL FAILURE WITH ACUTE CORTICAL NECROSIS: Status: RESOLVED | Noted: 2022-04-05 | Resolved: 2023-10-02

## 2023-10-02 PROBLEM — G89.18 POST-OP PAIN: Status: RESOLVED | Noted: 2022-04-06 | Resolved: 2023-10-02

## 2023-11-16 ENCOUNTER — OFFICE VISIT (OUTPATIENT)
Dept: FAMILY MEDICINE | Facility: CLINIC | Age: 63
End: 2023-11-16
Payer: MEDICARE

## 2023-11-16 VITALS
SYSTOLIC BLOOD PRESSURE: 124 MMHG | BODY MASS INDEX: 26.4 KG/M2 | RESPIRATION RATE: 16 BRPM | TEMPERATURE: 99 F | WEIGHT: 154.63 LBS | HEIGHT: 64 IN | OXYGEN SATURATION: 100 % | DIASTOLIC BLOOD PRESSURE: 68 MMHG | HEART RATE: 76 BPM

## 2023-11-16 DIAGNOSIS — M67.441 GANGLION CYST OF TENDON SHEATH OF RIGHT HAND: Primary | ICD-10-CM

## 2023-11-16 DIAGNOSIS — I12.9 HYPERTENSIVE CHRONIC KIDNEY DISEASE W STG 1-4/UNSP CHR KDNY: ICD-10-CM

## 2023-11-16 PROCEDURE — 3074F SYST BP LT 130 MM HG: CPT | Mod: ,,, | Performed by: NURSE PRACTITIONER

## 2023-11-16 PROCEDURE — 3074F PR MOST RECENT SYSTOLIC BLOOD PRESSURE < 130 MM HG: ICD-10-PCS | Mod: ,,, | Performed by: NURSE PRACTITIONER

## 2023-11-16 PROCEDURE — 3008F BODY MASS INDEX DOCD: CPT | Mod: ,,, | Performed by: NURSE PRACTITIONER

## 2023-11-16 PROCEDURE — 99213 PR OFFICE/OUTPT VISIT, EST, LEVL III, 20-29 MIN: ICD-10-PCS | Mod: ,,, | Performed by: NURSE PRACTITIONER

## 2023-11-16 PROCEDURE — 1101F PR PT FALLS ASSESS DOC 0-1 FALLS W/OUT INJ PAST YR: ICD-10-PCS | Mod: ,,, | Performed by: NURSE PRACTITIONER

## 2023-11-16 PROCEDURE — 3288F FALL RISK ASSESSMENT DOCD: CPT | Mod: ,,, | Performed by: NURSE PRACTITIONER

## 2023-11-16 PROCEDURE — 3078F DIAST BP <80 MM HG: CPT | Mod: ,,, | Performed by: NURSE PRACTITIONER

## 2023-11-16 PROCEDURE — 1101F PT FALLS ASSESS-DOCD LE1/YR: CPT | Mod: ,,, | Performed by: NURSE PRACTITIONER

## 2023-11-16 PROCEDURE — 1159F PR MEDICATION LIST DOCUMENTED IN MEDICAL RECORD: ICD-10-PCS | Mod: ,,, | Performed by: NURSE PRACTITIONER

## 2023-11-16 PROCEDURE — 1159F MED LIST DOCD IN RCRD: CPT | Mod: ,,, | Performed by: NURSE PRACTITIONER

## 2023-11-16 PROCEDURE — 1160F RVW MEDS BY RX/DR IN RCRD: CPT | Mod: ,,, | Performed by: NURSE PRACTITIONER

## 2023-11-16 PROCEDURE — 99213 OFFICE O/P EST LOW 20 MIN: CPT | Mod: ,,, | Performed by: NURSE PRACTITIONER

## 2023-11-16 PROCEDURE — 3078F PR MOST RECENT DIASTOLIC BLOOD PRESSURE < 80 MM HG: ICD-10-PCS | Mod: ,,, | Performed by: NURSE PRACTITIONER

## 2023-11-16 PROCEDURE — 3288F PR FALLS RISK ASSESSMENT DOCUMENTED: ICD-10-PCS | Mod: ,,, | Performed by: NURSE PRACTITIONER

## 2023-11-16 PROCEDURE — 1160F PR REVIEW ALL MEDS BY PRESCRIBER/CLIN PHARMACIST DOCUMENTED: ICD-10-PCS | Mod: ,,, | Performed by: NURSE PRACTITIONER

## 2023-11-16 PROCEDURE — 3008F PR BODY MASS INDEX (BMI) DOCUMENTED: ICD-10-PCS | Mod: ,,, | Performed by: NURSE PRACTITIONER

## 2023-11-16 RX ORDER — NIFEDIPINE 30 MG/1
30 TABLET, EXTENDED RELEASE ORAL DAILY
Qty: 90 TABLET | Refills: 1 | Status: SHIPPED | OUTPATIENT
Start: 2023-11-16 | End: 2024-05-14

## 2023-11-16 NOTE — PROGRESS NOTES
"Subjective:       Patient ID: Nadia Salas is a 63 y.o. female.    Chief Complaint: Hand Pain (Right hand with knot, states it is tender; denies any injury )    Presents to clinic as above. Hx of HTN. Needs refill on BP meds.       Review of Systems   Constitutional: Negative.    Respiratory: Negative.     Cardiovascular: Negative.           Reviewed family, medical, surgical, and social history.    Objective:      /68 (BP Location: Left arm, Patient Position: Sitting, BP Method: Medium (Manual))   Pulse 76   Temp 98.7 °F (37.1 °C) (Oral)   Resp 16   Ht 5' 4" (1.626 m)   Wt 70.1 kg (154 lb 9.6 oz)   SpO2 100%   BMI 26.54 kg/m²   Physical Exam  Vitals and nursing note reviewed.   Constitutional:       General: She is not in acute distress.     Appearance: Normal appearance. She is normal weight. She is not ill-appearing, toxic-appearing or diaphoretic.   HENT:      Head: Normocephalic.      Mouth/Throat:      Mouth: Mucous membranes are moist.   Cardiovascular:      Rate and Rhythm: Normal rate and regular rhythm.      Heart sounds: Normal heart sounds.   Pulmonary:      Effort: Pulmonary effort is normal.      Breath sounds: Normal breath sounds.   Musculoskeletal:      Cervical back: Normal range of motion and neck supple.      Comments: Approx 3 mm bluish colored cyst that is painful to palm of right hand.    Skin:     General: Skin is warm and dry.      Capillary Refill: Capillary refill takes less than 2 seconds.   Neurological:      Mental Status: She is alert and oriented to person, place, and time.   Psychiatric:         Mood and Affect: Mood normal.         Behavior: Behavior normal.         Thought Content: Thought content normal.         Judgment: Judgment normal.            No visits with results within 1 Day(s) from this visit.   Latest known visit with results is:   Lab Visit on 10/06/2023   Component Date Value Ref Range Status    GGT 10/06/2023 17  5 - 55 U/L Final    Magnesium " 10/06/2023 1.9  1.7 - 2.3 mg/dL Final    Phosphorus 10/06/2023 3.4  2.5 - 4.5 mg/dL Final    Sodium 10/06/2023 139  136 - 145 mmol/L Final    Potassium 10/06/2023 4.5  3.5 - 5.1 mmol/L Final    Chloride 10/06/2023 107  98 - 107 mmol/L Final    CO2 10/06/2023 27  21 - 32 mmol/L Final    Anion Gap 10/06/2023 10  7 - 16 mmol/L Final    Glucose 10/06/2023 124 (H)  74 - 106 mg/dL Final    BUN 10/06/2023 20 (H)  7 - 18 mg/dL Final    Creatinine 10/06/2023 1.39 (H)  0.55 - 1.02 mg/dL Final    BUN/Creatinine Ratio 10/06/2023 14  6 - 20 Final    Calcium 10/06/2023 8.7  8.5 - 10.1 mg/dL Final    Total Protein 10/06/2023 7.6  6.4 - 8.2 g/dL Final    Albumin 10/06/2023 3.7  3.5 - 5.0 g/dL Final    Globulin 10/06/2023 3.9  2.0 - 4.0 g/dL Final    A/G Ratio 10/06/2023 0.9   Final    Bilirubin, Total 10/06/2023 0.3  >0.0 - 1.2 mg/dL Final    Alk Phos 10/06/2023 131  55 - 142 U/L Final    ALT 10/06/2023 17  13 - 56 U/L Final    AST 10/06/2023 10 (L)  15 - 37 U/L Final    eGFR 10/06/2023 42 (L)  >=60 mL/min/1.73m2 Final    Bilirubin, Direct 10/06/2023 0.1  0.0 - 0.2 mg/dL Final    WBC 10/06/2023 3.36 (L)  4.50 - 11.00 K/uL Final    RBC 10/06/2023 3.98 (L)  4.20 - 5.40 M/uL Final    Hemoglobin 10/06/2023 11.8 (L)  12.0 - 16.0 g/dL Final    Hematocrit 10/06/2023 35.4 (L)  38.0 - 47.0 % Final    MCV 10/06/2023 88.9  80.0 - 96.0 fL Final    MCH 10/06/2023 29.6  27.0 - 31.0 pg Final    MCHC 10/06/2023 33.3  32.0 - 36.0 g/dL Final    RDW 10/06/2023 13.2  11.5 - 14.5 % Final    Platelet Count 10/06/2023 128 (L)  150 - 400 K/uL Final    MPV 10/06/2023 10.1  9.4 - 12.4 fL Final    Neutrophils % 10/06/2023 64.2  53.0 - 65.0 % Final    Lymphocytes % 10/06/2023 26.2 (L)  27.0 - 41.0 % Final    Monocytes % 10/06/2023 6.3 (H)  2.0 - 6.0 % Final    Eosinophils % 10/06/2023 1.8  1.0 - 4.0 % Final    Basophils % 10/06/2023 0.9  0.0 - 1.0 % Final    Immature Granulocytes % 10/06/2023 0.6 (H)  0.0 - 0.4 % Final    nRBC, Auto 10/06/2023 0.0  <=0.0 %  Final    Neutrophils, Abs 10/06/2023 2.16  1.80 - 7.70 K/uL Final    Lymphocytes, Absolute 10/06/2023 0.88 (L)  1.00 - 4.80 K/uL Final    Monocytes, Absolute 10/06/2023 0.21  0.00 - 0.80 K/uL Final    Eosinophils, Absolute 10/06/2023 0.06  0.00 - 0.50 K/uL Final    Basophils, Absolute 10/06/2023 0.03  0.00 - 0.20 K/uL Final    Immature Granulocytes, Absolute 10/06/2023 0.02  0.00 - 0.04 K/uL Final    nRBC, Absolute 10/06/2023 0.00  <=0.00 x10e3/uL Final    Diff Type 10/06/2023 Auto   Final    Tacrolimus 10/06/2023 2.8 (L)  5.0-15.0 (Trough) ng/mL Final       -------------------ADDITIONAL INFORMATION-------------------  Target steady-state trough concentrations vary depending on   the type of transplant, concomitant immunosuppression,   clinical/institutional protocols, and time post-transplant.   Results should be interpreted in conjunction with this   clinical information and any physical signs/symptoms of   rejection/toxicity.  Testing performed by Liquid Chromatography-Tandem Mass   Spectrometry (LC-MS/MS).  This test was developed and its performance characteristics   determined by [facility] in a manner consistent with CLIA   requirements. This test has not been cleared or approved by   the U.S. Food and Drug Administration.     Test Performed by:  [facility] [place] [id number]  : Sachin Degroot M.D. Ph.D.; CLIA# [id number]      Assessment:       1. Ganglion cyst of tendon sheath of right hand    2. Hypertensive chronic kidney disease w stg 1-4/unsp chr kdny        Plan:       Ganglion cyst of tendon sheath of right hand    Hypertensive chronic kidney disease w stg 1-4/unsp chr kdny  -     NIFEdipine (PROCARDIA-XL) 30 MG (OSM) 24 hr tablet; Take 1 tablet (30 mg total) by mouth once daily.  Dispense: 90 tablet; Refill: 1    Instructed these cysts are usually self limiting. However, notify me if would like a referral to ortho. She  said she will let me know.  RTC PRN          Risks, benefits, and side effects were discussed with the patient. All questions were answered to the fullest satisfaction of the patient, and pt verbalized understanding and agreement to treatment plan. Pt was to call with any new or worsening symptoms, or present to the ER.

## 2024-04-17 DIAGNOSIS — D22.9 NUMEROUS SKIN MOLES: Primary | ICD-10-CM

## 2024-04-26 ENCOUNTER — OFFICE VISIT (OUTPATIENT)
Dept: PRIMARY CARE CLINIC | Facility: CLINIC | Age: 64
End: 2024-04-26
Payer: MEDICARE

## 2024-04-26 VITALS
SYSTOLIC BLOOD PRESSURE: 128 MMHG | DIASTOLIC BLOOD PRESSURE: 80 MMHG | TEMPERATURE: 98 F | BODY MASS INDEX: 26.63 KG/M2 | HEIGHT: 64 IN | WEIGHT: 156 LBS | HEART RATE: 65 BPM | OXYGEN SATURATION: 99 % | RESPIRATION RATE: 16 BRPM

## 2024-04-26 DIAGNOSIS — I12.9 HYPERTENSIVE CHRONIC KIDNEY DISEASE W STG 1-4/UNSP CHR KDNY: ICD-10-CM

## 2024-04-26 DIAGNOSIS — Z94.4 STATUS POST LIVER TRANSPLANT: Primary | ICD-10-CM

## 2024-04-26 DIAGNOSIS — Z78.0 POST-MENOPAUSAL: ICD-10-CM

## 2024-04-26 DIAGNOSIS — Z76.0 MEDICATION REFILL: ICD-10-CM

## 2024-04-26 DIAGNOSIS — R11.0 NAUSEA: ICD-10-CM

## 2024-04-26 PROCEDURE — 1159F MED LIST DOCD IN RCRD: CPT | Mod: ,,, | Performed by: NURSE PRACTITIONER

## 2024-04-26 PROCEDURE — 3288F FALL RISK ASSESSMENT DOCD: CPT | Mod: ,,, | Performed by: NURSE PRACTITIONER

## 2024-04-26 PROCEDURE — 3074F SYST BP LT 130 MM HG: CPT | Mod: ,,, | Performed by: NURSE PRACTITIONER

## 2024-04-26 PROCEDURE — 3079F DIAST BP 80-89 MM HG: CPT | Mod: ,,, | Performed by: NURSE PRACTITIONER

## 2024-04-26 PROCEDURE — 1101F PT FALLS ASSESS-DOCD LE1/YR: CPT | Mod: ,,, | Performed by: NURSE PRACTITIONER

## 2024-04-26 PROCEDURE — 99214 OFFICE O/P EST MOD 30 MIN: CPT | Mod: ,,, | Performed by: NURSE PRACTITIONER

## 2024-04-26 PROCEDURE — 3008F BODY MASS INDEX DOCD: CPT | Mod: ,,, | Performed by: NURSE PRACTITIONER

## 2024-04-26 RX ORDER — ONDANSETRON 4 MG/1
4 TABLET, ORALLY DISINTEGRATING ORAL ONCE
Qty: 30 TABLET | Refills: 4 | Status: SHIPPED | OUTPATIENT
Start: 2024-04-26 | End: 2024-04-26

## 2024-04-26 RX ORDER — NIFEDIPINE 30 MG/1
30 TABLET, EXTENDED RELEASE ORAL DAILY
Qty: 90 TABLET | Refills: 3 | Status: SHIPPED | OUTPATIENT
Start: 2024-04-26 | End: 2025-04-21

## 2024-04-26 NOTE — PROGRESS NOTES
Pt is a 64 y/o WF known to me, s/p liver transplant, here for HTN med refills. Vaccines (flu, pneumonia, shingles) all UTD. Just saw GYN for check up - s/p hysterectomy. Mammogram scheduled for May 2024, last one May 2023. Follows with transplant hepatology at [facility] every 3 months with lab. Last labs less than 2 weeks ago.     Past Medical History:   Diagnosis Date    Abnormal uterine bleeding (AUB) 4/28/2021    Elevated serum creatinine 7/27/2021    Rechecked at 2.1 following completion of treatment for Hep C.    Hepatitis C      Patient Active Problem List   Diagnosis    Encounter for long-term (current) use of high-risk medication    History of hepatitis C    S/P hysterectomy    Abdominal ascites    Elevated serum creatinine    Chronic kidney disease, stage 3b    Liver transplant recipient    Personal history of other infectious and parasitic diseases    Personal history of nicotine dependence    History of uterine cancer    Hypertensive chronic kidney disease w stg 1-4/unsp chr kdny    Chronic kidney disease    CMV (cytomegalovirus infection) status positive    Endometrial adenocarcinoma    History of endometrial cancer    Hyponatremia    Nausea vomiting and diarrhea    Leukopenia    Lumbar radicular pain    Lytic bone lesion of hip    Portal hypertension    Prophylactic antibiotic    Right upper quadrant abdominal pain    Umbilical hernia without obstruction and without gangrene    Wheezing    Acute cough    Acute bronchitis     Review of Systems   Constitutional:  Negative for chills and fever.   HENT:  Negative for hearing loss.    Eyes:  Negative for discharge.   Respiratory:  Negative for shortness of breath and wheezing.    Cardiovascular:  Negative for chest pain and palpitations.   Gastrointestinal:  Negative for abdominal pain, blood in stool, constipation, diarrhea, melena, nausea and vomiting.   Genitourinary:  Negative for dysuria and hematuria.   Musculoskeletal:  Negative for neck pain.   Skin:   Negative for rash.   Neurological:  Negative for weakness and headaches.   Endo/Heme/Allergies:  Negative for polydipsia.     Answers submitted by the patient for this visit:  Review of Systems Questionnaire (Submitted on 4/23/2024)  activity change: No  unexpected weight change: No  rhinorrhea: No  trouble swallowing: No  visual disturbance: No  chest tightness: No  polyuria: No  difficulty urinating: No  menstrual problem: No  joint swelling: No  arthralgias: No  confusion: No  dysphoric mood: No    Physical Exam  Vitals reviewed. Exam conducted with a chaperone present.   Constitutional:       General: She is not in acute distress.     Appearance: Normal appearance.   HENT:      Head: Normocephalic.      Mouth/Throat:      Mouth: Mucous membranes are moist.      Pharynx: Oropharynx is clear.   Eyes:      General: No scleral icterus.     Pupils: Pupils are equal, round, and reactive to light.   Cardiovascular:      Rate and Rhythm: Normal rate.   Pulmonary:      Effort: Pulmonary effort is normal. No respiratory distress.      Breath sounds: Normal breath sounds.   Abdominal:      General: There is no distension.      Palpations: Abdomen is soft.      Tenderness: There is no abdominal tenderness. There is no guarding or rebound.   Skin:     General: Skin is warm and dry.   Neurological:      General: No focal deficit present.      Mental Status: She is alert and oriented to person, place, and time. Mental status is at baseline.   Psychiatric:         Mood and Affect: Mood normal.         Behavior: Behavior normal.         Thought Content: Thought content normal.         Judgment: Judgment normal.       Plan  Status post liver transplant    Hypertensive chronic kidney disease w stg 1-4/unsp chr kdny  -     NIFEdipine (PROCARDIA-XL) 30 MG (OSM) 24 hr tablet; Take 1 tablet (30 mg total) by mouth once daily.  Dispense: 90 tablet; Refill: 3    Nausea  -     ondansetron (ZOFRAN-ODT) 4 MG TbDL; Take 1 tablet (4 mg total)  by mouth once. for 1 dose  Dispense: 30 tablet; Refill: 4    Post-menopausal  -     DXA Bone Density Axial Skeleton 1 or more sites; Future; Expected date: 04/26/2024    DXA scheduled for 5/8/2024 at 10:40 am    Follow up in about 6 months (around 10/26/2024).

## 2024-05-09 DIAGNOSIS — Z71.89 COMPLEX CARE COORDINATION: ICD-10-CM

## 2024-05-14 ENCOUNTER — PATIENT MESSAGE (OUTPATIENT)
Dept: PRIMARY CARE CLINIC | Facility: CLINIC | Age: 64
End: 2024-05-14
Payer: MEDICARE

## 2024-05-14 ENCOUNTER — HOSPITAL ENCOUNTER (OUTPATIENT)
Dept: RADIOLOGY | Facility: HOSPITAL | Age: 64
Discharge: HOME | End: 2024-05-14
Attending: NURSE PRACTITIONER
Payer: MEDICARE

## 2024-05-14 DIAGNOSIS — Z78.0 POST-MENOPAUSAL: ICD-10-CM

## 2024-05-14 PROCEDURE — 77080 DXA BONE DENSITY AXIAL: CPT | Mod: TC

## 2024-05-14 PROCEDURE — 77080 DXA BONE DENSITY AXIAL: CPT | Mod: 26,,, | Performed by: RADIOLOGY

## 2024-05-15 DIAGNOSIS — M85.80 OSTEOPENIA, UNSPECIFIED LOCATION: Primary | ICD-10-CM

## 2024-05-30 ENCOUNTER — PATIENT MESSAGE (OUTPATIENT)
Dept: PRIMARY CARE CLINIC | Facility: CLINIC | Age: 64
End: 2024-05-30
Payer: MEDICARE

## 2024-07-26 ENCOUNTER — PATIENT MESSAGE (OUTPATIENT)
Dept: FAMILY MEDICINE | Facility: CLINIC | Age: 64
End: 2024-07-26
Payer: MEDICARE

## 2024-08-12 ENCOUNTER — TELEPHONE (OUTPATIENT)
Dept: INTERNAL MEDICINE | Facility: CLINIC | Age: 64
End: 2024-08-12
Payer: MEDICARE

## 2024-08-14 ENCOUNTER — OFFICE VISIT (OUTPATIENT)
Dept: INTERNAL MEDICINE | Facility: CLINIC | Age: 64
End: 2024-08-14
Payer: MEDICARE

## 2024-08-14 VITALS
SYSTOLIC BLOOD PRESSURE: 132 MMHG | WEIGHT: 156 LBS | OXYGEN SATURATION: 98 % | BODY MASS INDEX: 26.78 KG/M2 | DIASTOLIC BLOOD PRESSURE: 90 MMHG | HEART RATE: 69 BPM

## 2024-08-14 DIAGNOSIS — N18.32 CHRONIC KIDNEY DISEASE, STAGE 3B: ICD-10-CM

## 2024-08-14 DIAGNOSIS — M85.80 OSTEOPENIA, UNSPECIFIED LOCATION: ICD-10-CM

## 2024-08-14 DIAGNOSIS — Z78.0 OSTEOPENIA AFTER MENOPAUSE: Primary | ICD-10-CM

## 2024-08-14 DIAGNOSIS — Z94.4 LIVER TRANSPLANT RECIPIENT: ICD-10-CM

## 2024-08-14 DIAGNOSIS — M54.9 BACK PAIN, UNSPECIFIED BACK LOCATION, UNSPECIFIED BACK PAIN LATERALITY, UNSPECIFIED CHRONICITY: ICD-10-CM

## 2024-08-14 DIAGNOSIS — M85.80 OSTEOPENIA AFTER MENOPAUSE: Primary | ICD-10-CM

## 2024-08-14 PROCEDURE — 1160F RVW MEDS BY RX/DR IN RCRD: CPT | Mod: CPTII,,, | Performed by: NURSE PRACTITIONER

## 2024-08-14 PROCEDURE — 3008F BODY MASS INDEX DOCD: CPT | Mod: CPTII,,, | Performed by: NURSE PRACTITIONER

## 2024-08-14 PROCEDURE — 99214 OFFICE O/P EST MOD 30 MIN: CPT | Mod: S$PBB,,, | Performed by: NURSE PRACTITIONER

## 2024-08-14 PROCEDURE — 99214 OFFICE O/P EST MOD 30 MIN: CPT | Mod: PBBFAC | Performed by: NURSE PRACTITIONER

## 2024-08-14 PROCEDURE — 3075F SYST BP GE 130 - 139MM HG: CPT | Mod: CPTII,,, | Performed by: NURSE PRACTITIONER

## 2024-08-14 PROCEDURE — 99999 PR PBB SHADOW E&M-EST. PATIENT-LVL IV: CPT | Mod: PBBFAC,,, | Performed by: NURSE PRACTITIONER

## 2024-08-14 PROCEDURE — 3080F DIAST BP >= 90 MM HG: CPT | Mod: CPTII,,, | Performed by: NURSE PRACTITIONER

## 2024-08-14 PROCEDURE — 1159F MED LIST DOCD IN RCRD: CPT | Mod: CPTII,,, | Performed by: NURSE PRACTITIONER

## 2024-08-14 RX ORDER — ONDANSETRON 4 MG/1
TABLET, ORALLY DISINTEGRATING ORAL
COMMUNITY
Start: 2024-08-07 | End: 2024-08-22 | Stop reason: SDUPTHER

## 2024-08-14 RX ORDER — PANTOPRAZOLE SODIUM 40 MG/1
1 TABLET, DELAYED RELEASE ORAL DAILY
COMMUNITY
Start: 2024-01-30 | End: 2025-01-29

## 2024-08-14 NOTE — PROGRESS NOTES
HPI/CC  Ms. Nadia Salas is a 64 is referred for evaluation and management of osteoporosis by Nata Self.   PCP is   Medical/surgical history: History of endometrial cancer, liver transplant recipient, CKD Stage 3, lumbar pain.  Current c/o low back pain  Surgical history:  Hysterectomy, liver transplant  DXA Scan  I reviewed images and report of DXA scan performed at [facility] on  5/14/24.  T-Scores  Femoral Neck -1.7, Total Hip -1.8, Total Spine -1.9  In range of osteopenia    FRAX Score  10 year probability Major Osteoporotic Fracture  9.9%  10 year probability Hip Fracture 1.3%  The intervention threshold is 20% for Major Osteoporotic Fracture or 3% for Hip Fracture     Fracture history       Personal    No history of fractures in adulthood       Family        No history of hip fracture in parents    Calcium and Vit D  Takes supplement of calcium and Vit D    Weight Bearing Exercise/ Mobility      Falls  No recent falls    Risk factors for osteoporosis/fracture  Positive for  Postmenopausal  female with menopause age 50.  History of endometrial cancer  BMD T-scores in range of osteopenia  History of nicotine dependence  Negative for   Does not smoke cigarettes at this time  No history of fractures in adulthood  No history of hip fracture in parents  Review of Systems   Constitutional: Negative for fever, chills, malaise.    Respiratory: Negative for cough and shortness of breath.    Cardiovascular: Negative for chest pain and palpitations.   Gastrointestinal: Negative for change in bowel habit, constipation   Neurological: Negative for dizziness, syncope, weakness and light-headedness.   Musculoskeletal:   Does not require assistive devices for ambulation. C/O low back pain    Physical Exam  Constitutional:       Appearance: In no distress   HENT:      Mouth/Throat:      Mouth: Mucous membranes are moist.      Dental:  Eyes:      Conjunctiva/sclera: Conjunctivae normal.    Cardiovascular:      Rate and Rhythm: Normal rate and regular rhythm.   Pulmonary:      Effort: Pulmonary effort is normal.   Musculoskeletal:      Cervical back: Normal range of motion.      Thoracic back:  No kyphosis     Lumbar back: Normal range of motion. No scoliosis.      Gait: No impairments  Skin:     General: Skin is warm and dry.   Neurological:      Mental Status: She is alert and oriented to person, place, and time.     Assessment:       1. Osteopenia   2.    Liver Transplant Recipient  3.    Stage 3b CKD  4.    Low back pain  Plan:   I reviewed the images and report of DXA scan with pt.   Reviewed personal risk factors for osteoporosis  Offered overview of osteoporosis disease process and reviewed role of calcium, Vit D, and weight bearing exercise in bone health.  Reviewed body mechanics and falls precautions.    Reviewed role of anabolic and antiresorptive medications, including literature on risks and benefits of treatment vs non treatment with medications.  Based upon DXA report and FRAX score, no medication for osteoporosis is recommended.    Lab: Vit D  6.   Referral to Dr. Self, Orthopedic Spine, for lumbar pain  7.   Repeat DXA in 2 years.

## 2024-08-20 DIAGNOSIS — M54.16 LUMBAR RADICULAR PAIN: Primary | ICD-10-CM

## 2024-08-21 ENCOUNTER — OFFICE VISIT (OUTPATIENT)
Dept: SPINE | Facility: CLINIC | Age: 64
End: 2024-08-21
Payer: MEDICARE

## 2024-08-21 ENCOUNTER — HOSPITAL ENCOUNTER (OUTPATIENT)
Dept: RADIOLOGY | Facility: HOSPITAL | Age: 64
Discharge: HOME | End: 2024-08-21
Attending: NURSE PRACTITIONER
Payer: MEDICARE

## 2024-08-21 DIAGNOSIS — M54.16 LUMBAR RADICULOPATHY, CHRONIC: Primary | ICD-10-CM

## 2024-08-21 DIAGNOSIS — M54.14 THORACIC RADICULOPATHY: ICD-10-CM

## 2024-08-21 DIAGNOSIS — M54.16 LUMBAR RADICULAR PAIN: ICD-10-CM

## 2024-08-21 DIAGNOSIS — M54.9 BACK PAIN, UNSPECIFIED BACK LOCATION, UNSPECIFIED BACK PAIN LATERALITY, UNSPECIFIED CHRONICITY: ICD-10-CM

## 2024-08-21 PROCEDURE — 99215 OFFICE O/P EST HI 40 MIN: CPT | Mod: S$PBB,,, | Performed by: NURSE PRACTITIONER

## 2024-08-21 PROCEDURE — 72110 X-RAY EXAM L-2 SPINE 4/>VWS: CPT | Mod: TC

## 2024-08-21 PROCEDURE — 72110 X-RAY EXAM L-2 SPINE 4/>VWS: CPT | Mod: 26,,, | Performed by: RADIOLOGY

## 2024-08-21 PROCEDURE — 1159F MED LIST DOCD IN RCRD: CPT | Mod: CPTII,,, | Performed by: NURSE PRACTITIONER

## 2024-08-21 PROCEDURE — 99214 OFFICE O/P EST MOD 30 MIN: CPT | Mod: PBBFAC,25 | Performed by: NURSE PRACTITIONER

## 2024-08-21 PROCEDURE — 99999 PR PBB SHADOW E&M-EST. PATIENT-LVL IV: CPT | Mod: PBBFAC,,, | Performed by: NURSE PRACTITIONER

## 2024-08-21 RX ORDER — ALPRAZOLAM 1 MG/1
1 TABLET ORAL
Qty: 2 TABLET | Refills: 0 | Status: SHIPPED | OUTPATIENT
Start: 2024-08-21 | End: 2024-09-20

## 2024-08-21 RX ORDER — GABAPENTIN 100 MG/1
100 CAPSULE ORAL 3 TIMES DAILY
Qty: 90 CAPSULE | Refills: 11 | Status: SHIPPED | OUTPATIENT
Start: 2024-08-21 | End: 2025-08-21

## 2024-08-21 NOTE — PATIENT INSTRUCTIONS
Your MRI is scheduled for 09/06 @ 8:30AM at [facility].   The address is : [address] [place] [address]  The phone number is [phone]

## 2024-08-21 NOTE — PROGRESS NOTES
MDM/time:  45-50 minutes spent on this encounter including 15 minutes reviewing imaging and notes, 20 minutes with the patient, 10 minutes documentation    ASSESSMENT:  64 y.o. female with thoracic and lumbar spondylosis with radiculopathy    PLAN:  Neurontin 100mg 1 tab TID   MRI thoracic and lumbar spine (open)  Follow up after MRI     HPI:  64 y.o. female here for evaluation of mid to lower back pain with upper bilateral arms and lower leg tingling.  Patient reports she has had a history of back pain for the past 3 years started after her liver transplant.  Patient reports no known injury.  Reports increased pain over past few years.  She had a liver transplant at [facility] in 2022 with Dr. Delgado is currently doing well.  No difficulty with  strength.  No issues with balance or falls.  Denies bladder bowel incontinence.  Decreased walking tolerance due to pain.  Currently taking Tylenol rarely as needed for pain.  No recent physical therapy.  No prior pain management.  No recent MRI.  No prior spine surgery.  Patient is not a smoker.    IMAGING:  X-Ray Lumbar 4-5 View including Bending Views  Narrative: EXAMINATION:  XR LUMBAR SPINE 4-5 VIEW WITH BENDING VIEWS    CLINICAL HISTORY:  Radiculopathy, lumbar region    TECHNIQUE:  AP, lateral, flexion, extension lumbar spine radiograph    COMPARISON:  None    FINDINGS:  The vertebral body heights and alignment are maintained.  No change in alignment on dynamic imaging.  Mild diffuse degenerative endplate changes with osteophyte formation.  Moderate facet degeneration with sclerosis and osteophyte formation.  Impression: Multilevel degenerative changes.    Electronically signed by: Alfonzo Savage  Date:    08/21/2024  Time:    14:56       DXA Bone Density Axial Skeleton 1 or more sites  Narrative: EXAMINATION:  DXA BONE DENSITY AXIAL SKELETON 1 OR MORE SITES    CLINICAL HISTORY:  Asymptomatic menopausal state    TECHNIQUE:  Bone densitometry performed    COMPARISON:  None  available    FINDINGS:  Vertebral body T score measurements are estimated at -1.9    Femoral neck T score measurements were estimated at -1.8    Estimated 10 year fracture rate 9.9%  Impression: Measurements fall within the osteopenia range  increased fracture risk.    Electronically signed by: Duarte Kunz  Date:    05/14/2024  Time:    10:15       Past Medical History:   Diagnosis Date    Abnormal uterine bleeding (AUB) 4/28/2021    Elevated serum creatinine 7/27/2021    Rechecked at 2.1 following completion of treatment for Hep C.    Hepatitis C      Past Surgical History:   Procedure Laterality Date    CHOLECYSTECTOMY      HERNIA REPAIR      HYSTERECTOMY      LIVER TRANSPLANT      ROBOT-ASSISTED LAPAROSCOPIC HYSTERECTOMY N/A 04/29/2021    Procedure: ROBOTIC HYSTERECTOMY;  Surgeon: Swapna Sow DO;  Location: [facility] OR;  Service: OB/GYN;  Laterality: N/A;    ROBOT-ASSISTED LAPAROSCOPIC SALPINGO-OOPHORECTOMY Bilateral 04/29/2021    Procedure: ROBOTIC SALPINGO-OOPHORECTOMY;  Surgeon: Swapna Sow DO;  Location: [facility] OR;  Service: OB/GYN;  Laterality: Bilateral;    TONSILLECTOMY       Social History     Tobacco Use    Smoking status: Never    Smokeless tobacco: Never   Substance Use Topics    Alcohol use: Not Currently    Drug use: Never      Current Outpatient Medications   Medication Instructions    albuterol (VENTOLIN HFA) 90 mcg/actuation inhaler 2 puffs, Inhalation, Every 6 hours PRN, Rescue    aspirin (ECOTRIN) 81 mg, Oral    NIFEdipine (PROCARDIA-XL) 30 mg, Oral, Daily    ondansetron (ZOFRAN-ODT) 4 MG TbDL DISSOLVE 1 TABLET IN MOUTH ONCE DAILY FOR 1 DOSE    pantoprazole (PROTONIX) 40 MG tablet 1 tablet, Oral, Daily    tacrolimus (PROGRAF) 1 MG Cap Oral, Take 2 mg in the morning 3 mg at night        EXAM:  Constitutional  General Appearance:  There is no height or weight on file to calculate BMI., NAD  Psychiatric   Orientation: Oriented to time, oriented to place, oriented to  person  Mood and Affect: Active and alert, normal mood, normal affect  Gait and Station   Appearance:  Antalgic gait, normal tandem gait, able to walk on toes, able to walk on heels    LUMBAR  Musculoskeletal System   Hips: Normal appearance, no leg length discrepancy, normal motion; left, normal motion; right    Lumbar Spine                   Inspection:  Normal alignment, normal sagittal balance                  Range of motion:  Decreased flexion, extension, lateral bending, rotation. Pain with range of motion                  Bony Palpation of the Lumbar Spine:  No tenderness of the spinous process, no tenderness of the sacrum, no tenderness of the coccyx                  Bony Palpation of the Right Hip:  No tenderness of the iliac crest, no tenderness of the sciatic notch, no tenderness of the SI joint                  Bony Palpation of the Left Hip:  No tenderness of the iliac crest, no tenderness of the sciatic notch, no tenderness of the SI joint                  Soft Tissue Palpation on the Right:  No tenderness of the paraspinal region, no tenderness of the iliolumbar region                  Soft Tissue Palpation on the Left:  No tenderness of the paraspinal region, no tenderness of the iliolumbar region    Motor Strength   L1 Right:  Hip flexion iliopsoas 4/5    L1 Left:  Hip flexion iliopsoas 4/5              L2-L4 Right:  Knee extension quadriceps 4/5, tibialis anterior 4/5              L2-L4 Left:  Knee extension quadriceps 4/5, tibialis anterior 4/5   L5 Right:  Extensor hallucis llongus 5/5,    L5 Left:  Extensor hallucis longus 5/5,    S1 Right:  Plantar flexion gastrocnemius 5/5   S1 Left:  Plantar flexion gastrocnemius 5/5    Neurological System   Ankle Reflex Right:  normal   Ankle Reflex Left: normal   Knee Reflex Right:  normal   Knee Reflex Left:  normal   Sensation on the Right:  L2 normal, L3 normal, L4 normal, L5 normal, S1 normal   Sensation on the Left:  L2 normal, L3 normal, L4 normal,  L5 normal, S1 normal              Special Test on the Right:  Seated straight leg raising test negative, no clonus of the ankle              Special Test on the Left:  Seated straight leg raising test negative, no clonus of the ankle    Skin   Lumbosacral Spine:  Normal skin    Cardiovascular System   Arterial Pulses Right:  Posterior tibialis normal, dorsalis pedis normal   Arterial Pulses Left:  Posterior tibialis normal, dorsalis pedis normal   Edema Right: None   Edema Left:  None

## 2024-08-22 ENCOUNTER — OFFICE VISIT (OUTPATIENT)
Dept: FAMILY MEDICINE | Facility: CLINIC | Age: 64
End: 2024-08-22
Payer: MEDICARE

## 2024-08-22 VITALS
OXYGEN SATURATION: 100 % | SYSTOLIC BLOOD PRESSURE: 162 MMHG | BODY MASS INDEX: 26.6 KG/M2 | HEART RATE: 66 BPM | TEMPERATURE: 98 F | HEIGHT: 64 IN | DIASTOLIC BLOOD PRESSURE: 89 MMHG | RESPIRATION RATE: 18 BRPM | WEIGHT: 155.81 LBS

## 2024-08-22 DIAGNOSIS — R11.0 NAUSEA: ICD-10-CM

## 2024-08-22 DIAGNOSIS — K76.6 PORTAL HYPERTENSION: Chronic | ICD-10-CM

## 2024-08-22 DIAGNOSIS — I15.8 OTHER SECONDARY HYPERTENSION: Chronic | ICD-10-CM

## 2024-08-22 DIAGNOSIS — C54.1 ENDOMETRIAL ADENOCARCINOMA: ICD-10-CM

## 2024-08-22 DIAGNOSIS — Z94.4 LIVER TRANSPLANT RECIPIENT: Chronic | ICD-10-CM

## 2024-08-22 DIAGNOSIS — D84.9 IMMUNOSUPPRESSION: Chronic | ICD-10-CM

## 2024-08-22 DIAGNOSIS — D69.6 THROMBOCYTOPENIA: ICD-10-CM

## 2024-08-22 DIAGNOSIS — R73.9 HYPERGLYCEMIA: Primary | Chronic | ICD-10-CM

## 2024-08-22 PROCEDURE — 1101F PT FALLS ASSESS-DOCD LE1/YR: CPT | Mod: ,,, | Performed by: FAMILY MEDICINE

## 2024-08-22 PROCEDURE — 99214 OFFICE O/P EST MOD 30 MIN: CPT | Mod: ,,, | Performed by: FAMILY MEDICINE

## 2024-08-22 PROCEDURE — 3077F SYST BP >= 140 MM HG: CPT | Mod: ,,, | Performed by: FAMILY MEDICINE

## 2024-08-22 PROCEDURE — 3008F BODY MASS INDEX DOCD: CPT | Mod: ,,, | Performed by: FAMILY MEDICINE

## 2024-08-22 PROCEDURE — 3044F HG A1C LEVEL LT 7.0%: CPT | Mod: ,,, | Performed by: FAMILY MEDICINE

## 2024-08-22 PROCEDURE — 3288F FALL RISK ASSESSMENT DOCD: CPT | Mod: ,,, | Performed by: FAMILY MEDICINE

## 2024-08-22 PROCEDURE — 3079F DIAST BP 80-89 MM HG: CPT | Mod: ,,, | Performed by: FAMILY MEDICINE

## 2024-08-22 RX ORDER — ONDANSETRON 4 MG/1
4 TABLET, ORALLY DISINTEGRATING ORAL EVERY 8 HOURS PRN
Qty: 30 TABLET | Refills: 11 | Status: SHIPPED | OUTPATIENT
Start: 2024-08-22

## 2024-08-27 ENCOUNTER — OFFICE VISIT (OUTPATIENT)
Dept: DERMATOLOGY | Facility: CLINIC | Age: 64
End: 2024-08-27
Payer: MEDICARE

## 2024-08-27 DIAGNOSIS — D48.9 NEOPLASM OF UNCERTAIN BEHAVIOR: Primary | ICD-10-CM

## 2024-08-27 DIAGNOSIS — L71.9 ROSACEA: ICD-10-CM

## 2024-08-27 DIAGNOSIS — Z80.8 FAMILY HISTORY OF MELANOMA: ICD-10-CM

## 2024-08-27 DIAGNOSIS — L30.9 DERMATITIS, UNSPECIFIED: ICD-10-CM

## 2024-08-27 DIAGNOSIS — D23.9 DERMATOFIBROMA: ICD-10-CM

## 2024-08-27 DIAGNOSIS — D22.9 NUMEROUS SKIN MOLES: ICD-10-CM

## 2024-08-27 PROCEDURE — 88341 IMHCHEM/IMCYTCHM EA ADD ANTB: CPT | Mod: 26,,, | Performed by: PATHOLOGY

## 2024-08-27 PROCEDURE — 88305 TISSUE EXAM BY PATHOLOGIST: CPT | Mod: 26,,, | Performed by: PATHOLOGY

## 2024-08-27 PROCEDURE — 88342 IMHCHEM/IMCYTCHM 1ST ANTB: CPT | Mod: 26,,, | Performed by: PATHOLOGY

## 2024-08-27 PROCEDURE — 88305 TISSUE EXAM BY PATHOLOGIST: CPT | Mod: TC,91,SUR | Performed by: STUDENT IN AN ORGANIZED HEALTH CARE EDUCATION/TRAINING PROGRAM

## 2024-08-27 RX ORDER — CLOTRIMAZOLE AND BETAMETHASONE DIPROPIONATE 10; .64 MG/G; MG/G
CREAM TOPICAL 2 TIMES DAILY
Qty: 45 G | Refills: 5 | Status: SHIPPED | OUTPATIENT
Start: 2024-08-27

## 2024-08-27 RX ORDER — METRONIDAZOLE 7.5 MG/G
GEL TOPICAL 2 TIMES DAILY
Qty: 45 G | Refills: 11 | Status: SHIPPED | OUTPATIENT
Start: 2024-08-27 | End: 2025-08-27

## 2024-08-27 NOTE — PROGRESS NOTES
[facility]  Asad Witt MD    [address] [address]  [phone]    Fax: [phone]    Patient Name: Nadia Salas  Medical Record Number: [id number]  PCP: Ivelisse Chaudhary MD  Age: 64 y.o. : 1960  Contact: [phone] (work)    CC: lesion on the face  History of Present Illness:     Nadia Salas is a 64 y.o.  female with no history of skin cancer (family history of melanoma) who presents to clinic today for lesion on the face. It has grown darker. She also complains of scaly rash on the face.     The patient has no other concerns today.    Review of Systems:     Unremarkable other than mentioned above.     Physical Exam:     General: Relaxed, oriented, alert    Skin examination of the scalp, face, neck, chest, back, abdomen, upper extremities and lower extremities were normal except for as listed below            Assessment and Plan:     1. Family history of melanoma   - recommend annual skin exams   - discussed ABCDs of melanoma and recommend sunscreen 30 SPF or higher       2. Neoplasm of Uncertain Behavior x 2     A) irregular brown macule located on the L cheek  Ddx includes: nevus r/o atypia    B) irregular brown macule located on the mid back   Ddx includes: nevus r/o atypia      Shave biopsy  x 2     Pre-procedure Diagnosis: as above  Post_procedure Diagnosis: same  Estimated Blood Loss: <1cc    Findings: None  Complications: None  Specimens: to pathology      Written informed consent was obtained after discussing risks including pain, bleeding, infection, recurrence and scarring. The biopsy site was sterilely prepped with alcohol, which was allowed to dry completely, then locally infiltrated with 1% lidocaine with epinephrine, ~3 cc total. A shave biopsy was obtained using a Dermablade/15 and the specimen was sent to dermatopathology. Aluminum chloride was used for hemostasis. Antibiotic ointment and a clean dressing were applied.  The patient tolerated the procedure well without complications. Verbal and written wound care instructions were given.    Asad Witt MD       3. Dermatofibroma   - dome-shaped pink-tan nodule with positive dimple sign on the L leg    Plan: Reassure  Dermatofibromas are benign. They should be surgically removed if symptomatic or if they grow.    4. Rosacea  - Erythematous papules and pustules on cheeks and nose with background erythema     Plan:    Metronidazole gel bid PRN       Counseling.  Rosacea can be a/w cardiac disease, and can involve the eyes as well.     5. Dermatitis Unspecified  - annular scaly plaque on L hand  DDx: tinea vs irritant contact dermatitis    Plan:   - lotrisone cream     Counseling  I counseled the patient regarding the following:  Skin care: Patient instructed to use gentle skin care including dove unscented soap, CeraVe moisturizing cream, and fragrance free laundry detergent.  Expectations: The patient understands that there is not a definitive diagnosis at this time. Further testing or empiric therapy may be necessary to diagnose and improve the condition.  Contact office if: The patient develops a fever, or rash dramatically worsens despite treatment.      Asad Witt MD   Mohs Surgery/Dermatologic Oncology  Dermatology

## 2024-08-29 LAB
DHEA SERPL-MCNC: NORMAL
ESTROGEN SERPL-MCNC: NORMAL PG/ML
INSULIN SERPL-ACNC: NORMAL U[IU]/ML
LAB AP GROSS DESCRIPTION: NORMAL
LAB AP LABORATORY NOTES: NORMAL
LAB AP SPEC A DDX: NORMAL
LAB AP SPEC A MORPHOLOGY: NORMAL
LAB AP SPEC A PROCEDURE: NORMAL
LAB AP SPEC B DDX: NORMAL
LAB AP SPEC B MORPHOLOGY: NORMAL
LAB AP SPEC B PROCEDURE: NORMAL
T3RU NFR SERPL: NORMAL %

## 2024-09-06 ENCOUNTER — OFFICE VISIT (OUTPATIENT)
Dept: SPINE | Facility: CLINIC | Age: 64
End: 2024-09-06
Payer: MEDICARE

## 2024-09-06 DIAGNOSIS — M54.16 LUMBAR RADICULOPATHY, CHRONIC: Primary | ICD-10-CM

## 2024-09-06 DIAGNOSIS — M43.16 SPONDYLOLISTHESIS, LUMBAR REGION: ICD-10-CM

## 2024-09-06 PROCEDURE — 1159F MED LIST DOCD IN RCRD: CPT | Mod: CPTII,,, | Performed by: ORTHOPAEDIC SURGERY

## 2024-09-06 PROCEDURE — 99999 PR PBB SHADOW E&M-EST. PATIENT-LVL III: CPT | Mod: PBBFAC,,, | Performed by: ORTHOPAEDIC SURGERY

## 2024-09-06 PROCEDURE — 99214 OFFICE O/P EST MOD 30 MIN: CPT | Mod: S$PBB,,, | Performed by: ORTHOPAEDIC SURGERY

## 2024-09-06 PROCEDURE — 99213 OFFICE O/P EST LOW 20 MIN: CPT | Mod: PBBFAC | Performed by: ORTHOPAEDIC SURGERY

## 2024-09-06 RX ORDER — GABAPENTIN 300 MG/1
300 CAPSULE ORAL 3 TIMES DAILY
Qty: 90 CAPSULE | Refills: 11 | Status: SHIPPED | OUTPATIENT
Start: 2024-09-06 | End: 2025-09-06

## 2024-09-06 NOTE — PROGRESS NOTES
MDM/time:  30-35 minutes spent on this encounter including 10 minutes reviewing imaging and notes, 15 minutes with the patient, 5 minutes documentation    ASSESSMENT:  64 y.o. female with thoracic and lumbar spondylosis with radiculopathy    PLAN:  Increase Neurontin to 300 mg.  Pain referral.  Follow-up in 4 months     HPI:  64 y.o. female here for repeat evaluation of mid to lower back pain that radiates to bilateral lower extremities left-greater-than-right.  Reports her back pain is worse than her leg pain.  Patient reports she has had a history of back pain for the past 3 years started after her liver transplant.  Patient reports no known injury.  Reports increased pain over past few years.  She had a liver transplant at [facility] in 2022 with Dr. Delgado is currently doing well.  No difficulty with  strength.  No issues with balance or falls.  Denies bladder bowel incontinence.  Decreased walking tolerance due to pain.  Currently taking Tylenol rarely as needed for pain.  No recent physical therapy.  No prior pain management.  No prior spine surgery.  Patient is not a smoker.    IMAGING:  MRI lumbar spine at [facility] 09/06/2024 reviewed shows:   At L3-4 there is moderate left foraminal stenosis   At L4-5 there is grade 1 spondylolisthesis.  Mild bilateral lateral recess stenosis.  Moderate left foraminal stenosis  At L5-S1 there is a central disc protrusion moderate bilateral lateral recess stenosis.  Severe left foraminal stenosis    MRI thoracic spine at [facility] 09/06/2024 reviewed does not show any significant stenosis          Past Medical History:   Diagnosis Date    Abnormal uterine bleeding (AUB) 4/28/2021    Elevated serum creatinine 7/27/2021    Rechecked at 2.1 following completion of treatment for Hep C.    Hepatitis C      Past Surgical History:   Procedure Laterality Date    CHOLECYSTECTOMY      HERNIA REPAIR      HYSTERECTOMY      LIVER TRANSPLANT      ROBOT-ASSISTED LAPAROSCOPIC HYSTERECTOMY  N/A 04/29/2021    Procedure: ROBOTIC HYSTERECTOMY;  Surgeon: Swapna Sow DO;  Location: [facility] OR;  Service: OB/GYN;  Laterality: N/A;    ROBOT-ASSISTED LAPAROSCOPIC SALPINGO-OOPHORECTOMY Bilateral 04/29/2021    Procedure: ROBOTIC SALPINGO-OOPHORECTOMY;  Surgeon: Swapna Sow DO;  Location: [facility] OR;  Service: OB/GYN;  Laterality: Bilateral;    TONSILLECTOMY       Social History     Tobacco Use    Smoking status: Never    Smokeless tobacco: Never   Substance Use Topics    Alcohol use: Not Currently    Drug use: Never      Current Outpatient Medications   Medication Instructions    ALPRAZolam (XANAX) 1 mg, Oral, On Call Procedure    aspirin (ECOTRIN) 81 mg, Oral    clotrimazole-betamethasone 1-0.05% (LOTRISONE) cream Topical (Top), 2 times daily, For the rash on hand    gabapentin (NEURONTIN) 100 mg, Oral, 3 times daily    metroNIDAZOLE (METROGEL) 0.75 % gel Topical (Top), 2 times daily, For face    NIFEdipine (PROCARDIA-XL) 30 mg, Oral, Daily    ondansetron (ZOFRAN-ODT) 4 mg, Oral, Every 8 hours PRN    pantoprazole (PROTONIX) 40 MG tablet 1 tablet, Oral, Daily    tacrolimus (PROGRAF) 1 MG Cap Oral, Take 2 mg in the morning 3 mg at night        EXAM:  Constitutional  General Appearance:  There is no height or weight on file to calculate BMI., NAD  Psychiatric   Orientation: Oriented to time, oriented to place, oriented to person  Mood and Affect: Active and alert, normal mood, normal affect  Gait and Station   Appearance:  Antalgic gait, normal tandem gait, able to walk on toes, able to walk on heels    LUMBAR  Musculoskeletal System   Hips: Normal appearance, no leg length discrepancy, normal motion; left, normal motion; right    Lumbar Spine                   Inspection:  Normal alignment, normal sagittal balance                  Range of motion:  Decreased flexion, extension, lateral bending, rotation. Pain with range of motion                  Bony Palpation of the Lumbar Spine:  No  tenderness of the spinous process, no tenderness of the sacrum, no tenderness of the coccyx                  Bony Palpation of the Right Hip:  No tenderness of the iliac crest, no tenderness of the sciatic notch, no tenderness of the SI joint                  Bony Palpation of the Left Hip:  No tenderness of the iliac crest, no tenderness of the sciatic notch, no tenderness of the SI joint                  Soft Tissue Palpation on the Right:  No tenderness of the paraspinal region, no tenderness of the iliolumbar region                  Soft Tissue Palpation on the Left:  No tenderness of the paraspinal region, no tenderness of the iliolumbar region    Motor Strength   L1 Right:  Hip flexion iliopsoas 4/5    L1 Left:  Hip flexion iliopsoas 4/5              L2-L4 Right:  Knee extension quadriceps 4/5, tibialis anterior 4/5              L2-L4 Left:  Knee extension quadriceps 4/5, tibialis anterior 4/5   L5 Right:  Extensor hallucis llongus 5/5,    L5 Left:  Extensor hallucis longus 5/5,    S1 Right:  Plantar flexion gastrocnemius 5/5   S1 Left:  Plantar flexion gastrocnemius 5/5    Neurological System   Ankle Reflex Right:  normal   Ankle Reflex Left: normal   Knee Reflex Right:  normal   Knee Reflex Left:  normal   Sensation on the Right:  L2 normal, L3 normal, L4 normal, L5 normal, S1 normal   Sensation on the Left:  L2 normal, L3 normal, L4 normal, L5 normal, S1 normal              Special Test on the Right:  Seated straight leg raising test negative, no clonus of the ankle              Special Test on the Left:  Seated straight leg raising test negative, no clonus of the ankle    Skin   Lumbosacral Spine:  Normal skin    Cardiovascular System   Arterial Pulses Right:  Posterior tibialis normal, dorsalis pedis normal   Arterial Pulses Left:  Posterior tibialis normal, dorsalis pedis normal   Edema Right: None   Edema Left:  None

## 2024-09-13 ENCOUNTER — PATIENT MESSAGE (OUTPATIENT)
Dept: FAMILY MEDICINE | Facility: CLINIC | Age: 64
End: 2024-09-13
Payer: MEDICARE

## 2024-09-15 PROBLEM — I15.8 OTHER SECONDARY HYPERTENSION: Chronic | Status: ACTIVE | Noted: 2024-09-15

## 2024-09-15 PROBLEM — D69.6 THROMBOCYTOPENIA: Status: ACTIVE | Noted: 2024-09-15

## 2024-09-15 PROBLEM — D84.9 IMMUNOSUPPRESSION: Status: ACTIVE | Noted: 2024-09-15

## 2024-09-15 NOTE — PROGRESS NOTES
"Subjective     Patient ID: Nadia Salas is a 64 y.o. female.    Chief Complaint: Establish Care (New Pt )    65 yo WF patient of Priyank's here to establish. Has liver transplant followed at [facility].  BP is up at times as well.       Review of Systems   Constitutional:  Negative for activity change.   Eyes:  Negative for visual disturbance.   Respiratory:  Negative for shortness of breath.    Cardiovascular:  Negative for chest pain.   Gastrointestinal:  Negative for abdominal pain.   Neurological:  Negative for headaches.   Psychiatric/Behavioral:  Negative for dysphoric mood.      No visits with results within 1 Week(s) from this visit.   Latest known visit with results is:   Lab Visit on 08/14/2024   Component Date Value Ref Range Status    Vitamin D 25-Hydroxy, Blood 08/14/2024 40.2  ng/mL Final    Vitamin D 25-OH Adult Reference Values:  Deficiency: <20 ng/mL  Insufficiency: 20 - <30 ng/mL  Sufficiency: 30 -100 ng/mL    Vitamin D 25-OH Pediatric Reference Values:  Deficiency: <15 ng/mL  Insufficiency: 15 - <20 ng/mL  Sufficiency: 20 - 100 ng/mL          Objective   Blood pressure (!) 162/89, pulse 66, temperature 97.6 °F (36.4 °C), temperature source Oral, resp. rate 18, height 5' 4" (1.626 m), weight 70.7 kg (155 lb 12.8 oz), SpO2 100%.    Physical Exam  Constitutional:       Appearance: Normal appearance.   HENT:      Head: Normocephalic and atraumatic.      Right Ear: External ear normal.      Left Ear: External ear normal.      Nose: Nose normal.      Mouth/Throat:      Mouth: Mucous membranes are moist.   Eyes:      Conjunctiva/sclera: Conjunctivae normal.      Pupils: Pupils are equal, round, and reactive to light.   Cardiovascular:      Rate and Rhythm: Normal rate and regular rhythm.      Pulses: Normal pulses.      Heart sounds: Normal heart sounds.   Pulmonary:      Effort: Pulmonary effort is normal.      Breath sounds: Normal breath sounds.   Abdominal:      General: Abdomen is flat.      " Palpations: Abdomen is soft.   Musculoskeletal:         General: Normal range of motion.      Cervical back: Normal range of motion and neck supple.   Skin:     General: Skin is warm and dry.   Neurological:      General: No focal deficit present.      Mental Status: She is alert and oriented to person, place, and time.   Psychiatric:         Mood and Affect: Mood normal.         Behavior: Behavior normal.         Thought Content: Thought content normal.         Judgment: Judgment normal.            Assessment and Plan     1. Hyperglycemia  -     Hemoglobin A1C; Future; Expected date: 09/20/2024    2. Nausea  -     ondansetron (ZOFRAN-ODT) 4 MG TbDL; Take 1 tablet (4 mg total) by mouth every 8 (eight) hours as needed (nausea).  Dispense: 30 tablet; Refill: 11    3. Immunosuppression    4. Thrombocytopenia    5. Endometrial adenocarcinoma    6. Portal hypertension  Overview:  Last Assessment & Plan:   Formatting of this note might be different from the original.  Will restart spironolactone x 1 week, change lasix dose      7. Liver transplant recipient  Overview:  Last Assessment & Plan:   Formatting of this note might be different from the original.  OLT 4/5/22  DCD  CIT 8:49  WIT 34 min   Duct to duct  chronic HCV now s/p treatment in 2021 with SVR  With decompensated cirrhosis MELD 26  LFTs trending down      8. Other secondary hypertension        Consider increase in nifedipine. Monitor BP more.          Follow up in about 6 months (around 2/22/2025) for for recheck .

## 2024-09-29 ENCOUNTER — OFFICE VISIT (OUTPATIENT)
Dept: FAMILY MEDICINE | Facility: CLINIC | Age: 64
End: 2024-09-29
Payer: MEDICARE

## 2024-09-29 VITALS
SYSTOLIC BLOOD PRESSURE: 140 MMHG | DIASTOLIC BLOOD PRESSURE: 90 MMHG | TEMPERATURE: 98 F | BODY MASS INDEX: 27.14 KG/M2 | WEIGHT: 159 LBS | HEART RATE: 64 BPM | RESPIRATION RATE: 19 BRPM | OXYGEN SATURATION: 100 % | HEIGHT: 64 IN

## 2024-09-29 DIAGNOSIS — U07.1 COVID-19: Primary | ICD-10-CM

## 2024-09-29 DIAGNOSIS — K04.7 DENTAL ABSCESS: ICD-10-CM

## 2024-09-29 DIAGNOSIS — U07.1 COVID-19 VIRUS DETECTED: ICD-10-CM

## 2024-09-29 DIAGNOSIS — Z20.828 EXPOSURE TO VIRAL DISEASE: ICD-10-CM

## 2024-09-29 LAB
CTP QC/QA: YES
CTP QC/QA: YES
POC MOLECULAR INFLUENZA A AGN: NEGATIVE
POC MOLECULAR INFLUENZA B AGN: NEGATIVE
SARS-COV-2 RDRP RESP QL NAA+PROBE: POSITIVE

## 2024-09-29 PROCEDURE — 1160F RVW MEDS BY RX/DR IN RCRD: CPT | Mod: ,,, | Performed by: FAMILY MEDICINE

## 2024-09-29 PROCEDURE — 87502 INFLUENZA DNA AMP PROBE: CPT | Mod: QW,,, | Performed by: FAMILY MEDICINE

## 2024-09-29 PROCEDURE — 3080F DIAST BP >= 90 MM HG: CPT | Mod: ,,, | Performed by: FAMILY MEDICINE

## 2024-09-29 PROCEDURE — 1101F PT FALLS ASSESS-DOCD LE1/YR: CPT | Mod: ,,, | Performed by: FAMILY MEDICINE

## 2024-09-29 PROCEDURE — 87635 SARS-COV-2 COVID-19 AMP PRB: CPT | Mod: QW,,, | Performed by: FAMILY MEDICINE

## 2024-09-29 PROCEDURE — 3288F FALL RISK ASSESSMENT DOCD: CPT | Mod: ,,, | Performed by: FAMILY MEDICINE

## 2024-09-29 PROCEDURE — 3044F HG A1C LEVEL LT 7.0%: CPT | Mod: ,,, | Performed by: FAMILY MEDICINE

## 2024-09-29 PROCEDURE — 3008F BODY MASS INDEX DOCD: CPT | Mod: ,,, | Performed by: FAMILY MEDICINE

## 2024-09-29 PROCEDURE — 3077F SYST BP >= 140 MM HG: CPT | Mod: ,,, | Performed by: FAMILY MEDICINE

## 2024-09-29 PROCEDURE — 99214 OFFICE O/P EST MOD 30 MIN: CPT | Mod: ,,, | Performed by: FAMILY MEDICINE

## 2024-09-29 PROCEDURE — 1159F MED LIST DOCD IN RCRD: CPT | Mod: ,,, | Performed by: FAMILY MEDICINE

## 2024-09-29 RX ORDER — PREDNISONE 10 MG/1
10 TABLET ORAL DAILY
Qty: 5 TABLET | Refills: 0 | Status: SHIPPED | OUTPATIENT
Start: 2024-09-29 | End: 2024-10-04

## 2024-09-29 RX ORDER — PROMETHAZINE HYDROCHLORIDE AND DEXTROMETHORPHAN HYDROBROMIDE 6.25; 15 MG/5ML; MG/5ML
5 SYRUP ORAL EVERY 6 HOURS PRN
Qty: 118 ML | Refills: 0 | Status: SHIPPED | OUTPATIENT
Start: 2024-09-29

## 2024-09-29 RX ORDER — AZITHROMYCIN 250 MG/1
TABLET, FILM COATED ORAL
Qty: 6 TABLET | Refills: 0 | Status: SHIPPED | OUTPATIENT
Start: 2024-09-29

## 2024-09-29 NOTE — PROGRESS NOTES
Subjective:       Patient ID: Nadia Salas is a 64 y.o. female.    Chief Complaint: Cough, Chest Congestion, Generalized Body Aches, Nasal Congestion (Hx of liver transplant.), and Dizziness    Cough  Associated symptoms include postnasal drip and rhinorrhea. Pertinent negatives include no chest pain, chills, ear pain, fever, headaches, myalgias, rash, sore throat, shortness of breath or wheezing. There is no history of environmental allergies.   Dizziness: no hearing loss, no ear pain, no fever, no headaches, no tinnitus, no nausea, no vomiting, no diaphoresis, no weakness, no light-headedness, no palpitations and no chest pain.no environmental allergies.    Review of Systems   Constitutional:  Negative for activity change, appetite change, chills, diaphoresis, fatigue, fever and unexpected weight change.   HENT:  Positive for nasal congestion, postnasal drip, rhinorrhea and sinus pressure/congestion. Negative for dental problem, drooling, ear discharge, ear pain, facial swelling, hearing loss, mouth sores, nosebleeds, sneezing, sore throat, tinnitus, trouble swallowing, voice change and goiter.    Eyes:  Negative for photophobia, discharge, itching and visual disturbance.   Respiratory:  Positive for cough. Negative for apnea, choking, chest tightness, shortness of breath, wheezing and stridor.    Cardiovascular:  Negative for chest pain, palpitations, leg swelling and claudication.   Gastrointestinal:  Negative for abdominal distention, abdominal pain, anal bleeding, blood in stool, change in bowel habit, constipation, diarrhea, nausea and vomiting.   Endocrine: Negative for cold intolerance, heat intolerance, polydipsia, polyphagia and polyuria.   Genitourinary:  Negative for bladder incontinence, decreased urine volume, difficulty urinating, dysuria, enuresis, flank pain, frequency, hematuria, nocturia, pelvic pain and urgency.   Musculoskeletal:  Negative for arthralgias, back pain, gait problem, joint  swelling, leg pain, myalgias, neck pain, neck stiffness and joint deformity.   Integumentary:  Negative for pallor, rash, wound, breast mass and breast tenderness.   Allergic/Immunologic: Negative for environmental allergies, food allergies and immunocompromised state.   Neurological:  Positive for dizziness. Negative for vertigo, tremors, seizures, syncope, facial asymmetry, speech difficulty, weakness, light-headedness, numbness, headaches, memory loss and coordination difficulties.   Hematological:  Negative for adenopathy. Does not bruise/bleed easily.   Psychiatric/Behavioral:  Negative for agitation, behavioral problems, confusion, decreased concentration, dysphoric mood, hallucinations, self-injury, sleep disturbance and suicidal ideas. The patient is not nervous/anxious and is not hyperactive.    Breast: Negative for mass and tenderness        Objective:      Physical Exam  Vitals reviewed.   Constitutional:       Appearance: Normal appearance.   HENT:      Head: Normocephalic and atraumatic.      Right Ear: Tympanic membrane, ear canal and external ear normal.      Left Ear: Tympanic membrane, ear canal and external ear normal.      Nose: Congestion and rhinorrhea present.      Mouth/Throat:      Mouth: Mucous membranes are moist.      Pharynx: Oropharynx is clear. Posterior oropharyngeal erythema present.   Eyes:      Extraocular Movements: Extraocular movements intact.      Conjunctiva/sclera: Conjunctivae normal.      Pupils: Pupils are equal, round, and reactive to light.   Cardiovascular:      Rate and Rhythm: Normal rate and regular rhythm.      Pulses: Normal pulses.      Heart sounds: Normal heart sounds.   Pulmonary:      Effort: Pulmonary effort is normal.      Breath sounds: Rhonchi present.   Abdominal:      General: Bowel sounds are normal.      Palpations: Abdomen is soft.   Musculoskeletal:         General: Normal range of motion.      Cervical back: Normal range of motion and neck supple.    Skin:     General: Skin is warm and dry.   Neurological:      General: No focal deficit present.      Mental Status: She is alert. Mental status is at baseline.   Psychiatric:         Mood and Affect: Mood normal.         Behavior: Behavior normal.         Thought Content: Thought content normal.         Judgment: Judgment normal.         Assessment:       1. COVID-19    2. Exposure to viral disease    3. Dental abscess        Plan:     COVID-19  -     nirmatrelvir-ritonavir 300 mg (150 mg x 2)-100 mg copackaged tablets (EUA); Take 3 tablets by mouth 2 (two) times daily. Each dose contains 2 nirmatrelvir (pink tablets) and 1 ritonavir (white tablet). Take all 3 tablets together  Dispense: 30 tablet; Refill: 0    Exposure to viral disease  -     POCT COVID-19 Rapid Screening  -     POCT Influenza A/B Molecular    Dental abscess    Other orders  -     predniSONE (DELTASONE) 10 MG tablet; Take 1 tablet (10 mg total) by mouth once daily. for 5 days  Dispense: 5 tablet; Refill: 0  -     azithromycin (Z-WHITNEY) 250 MG tablet; Take 2 tablets by mouth on day 1; Take 1 tablet by mouth on days 2-5  Dispense: 6 tablet; Refill: 0  -     promethazine-dextromethorphan (PROMETHAZINE-DM) 6.25-15 mg/5 mL Syrp; Take 5 mLs by mouth every 6 (six) hours as needed.  Dispense: 118 mL; Refill: 0         Covid positive, will treat with paxlovid, contraindication with tacrolimus but discussed with patient that she will call her transplant specialist tomorrow to see if they are okay with her taking paxlovid. Instructed patient to only start the paxlovid if her doctor agreed to it. Patient agreed.

## 2024-10-16 ENCOUNTER — OFFICE VISIT (OUTPATIENT)
Dept: PAIN MEDICINE | Facility: CLINIC | Age: 64
End: 2024-10-16
Payer: MEDICARE

## 2024-10-16 VITALS
RESPIRATION RATE: 18 BRPM | SYSTOLIC BLOOD PRESSURE: 172 MMHG | WEIGHT: 160 LBS | DIASTOLIC BLOOD PRESSURE: 85 MMHG | HEIGHT: 64 IN | HEART RATE: 68 BPM | BODY MASS INDEX: 27.31 KG/M2

## 2024-10-16 DIAGNOSIS — Z79.899 ENCOUNTER FOR LONG-TERM (CURRENT) USE OF MEDICATIONS: Primary | ICD-10-CM

## 2024-10-16 DIAGNOSIS — M54.17 LUMBOSACRAL RADICULOPATHY: Chronic | ICD-10-CM

## 2024-10-16 DIAGNOSIS — G89.29 CHRONIC BILATERAL THORACIC BACK PAIN: Chronic | ICD-10-CM

## 2024-10-16 DIAGNOSIS — M54.6 CHRONIC BILATERAL THORACIC BACK PAIN: Chronic | ICD-10-CM

## 2024-10-16 DIAGNOSIS — M54.16 LUMBAR RADICULOPATHY, CHRONIC: Chronic | ICD-10-CM

## 2024-10-16 LAB

## 2024-10-16 PROCEDURE — 3077F SYST BP >= 140 MM HG: CPT | Mod: CPTII,,, | Performed by: PAIN MEDICINE

## 2024-10-16 PROCEDURE — 3288F FALL RISK ASSESSMENT DOCD: CPT | Mod: CPTII,,, | Performed by: PAIN MEDICINE

## 2024-10-16 PROCEDURE — 99215 OFFICE O/P EST HI 40 MIN: CPT | Mod: PBBFAC | Performed by: PAIN MEDICINE

## 2024-10-16 PROCEDURE — 1101F PT FALLS ASSESS-DOCD LE1/YR: CPT | Mod: CPTII,,, | Performed by: PAIN MEDICINE

## 2024-10-16 PROCEDURE — 3044F HG A1C LEVEL LT 7.0%: CPT | Mod: CPTII,,, | Performed by: PAIN MEDICINE

## 2024-10-16 PROCEDURE — 3008F BODY MASS INDEX DOCD: CPT | Mod: CPTII,,, | Performed by: PAIN MEDICINE

## 2024-10-16 PROCEDURE — 1125F AMNT PAIN NOTED PAIN PRSNT: CPT | Mod: CPTII,,, | Performed by: PAIN MEDICINE

## 2024-10-16 PROCEDURE — 99204 OFFICE O/P NEW MOD 45 MIN: CPT | Mod: S$PBB,,, | Performed by: PAIN MEDICINE

## 2024-10-16 PROCEDURE — 3079F DIAST BP 80-89 MM HG: CPT | Mod: CPTII,,, | Performed by: PAIN MEDICINE

## 2024-10-16 PROCEDURE — 99999PBSHW POCT URINE DRUG SCREEN PRESUMP: Mod: PBBFAC,,,

## 2024-10-16 PROCEDURE — 1159F MED LIST DOCD IN RCRD: CPT | Mod: CPTII,,, | Performed by: PAIN MEDICINE

## 2024-10-16 PROCEDURE — 99999 PR PBB SHADOW E&M-EST. PATIENT-LVL V: CPT | Mod: PBBFAC,,, | Performed by: PAIN MEDICINE

## 2024-10-16 PROCEDURE — 80305 DRUG TEST PRSMV DIR OPT OBS: CPT | Mod: PBBFAC | Performed by: PAIN MEDICINE

## 2024-10-16 NOTE — PROGRESS NOTES
Chronic Pain - New Consult    Referring Physician: Jamaal Self MD       SUBJECTIVE: Disclaimer: This note has been generated using voice-recognition software. There may be typographical errors that have been missed during proof-reading      Initial encounter:    Nadia Salas presents to the clinic for the evaluation of thoracic and lumbar pain.       64-year-old female presents as a new patient referral from Dr. Self.  Patient is a liver  transplant patient from 2022 at [facility].  Approximately 3 years ago, she developed lower back pain without a precipitating event.  Her pain has progressively worsened and she now complains of lower back pain with radicular symptoms to the bilateral lower extremities and feet.  She notes subjective weakness but denies falling, bowel or bladder involvement.  Her pain is exacerbated with walking, bending and turning in bed.  Nothing has provided significant improvement.  She also complains of thoracic and left intercostal pain of a lesser severity than the lower back pain.  MRI of the lumbar from [facility] September 06/20/2024 revealed L3-4 left foraminal stenosis, L4-5 bilateral stenosis and L5-S1 central disc protrusion and severe left foraminal stenosis.  Based on diagnostic and clinical findings,  she was referred to our clinic for an epidural steroid injection for lumbosacral radiculopathy.      Pain Assessment  Pain Assessment: 0-10  Pain Score:   8  Pain Location: Back  Pain Descriptors: Sharp  Pain Frequency: Constant/continuous  Pain Onset: Awakened from sleep  Aggravating Factors: Bending, Standing, Walking  Pain Intervention(s): Medication (See eMAR), Home medication, Heat applied      Physical Therapy/Home Exercise: no      Pain Medications:  has a current medication list which includes the following prescription(s): aspirin, gabapentin, nifedipine, pantoprazole, tacrolimus, alprazolam, azithromycin, clotrimazole-betamethasone 1-0.05%, metronidazole,  "nirmatrelvir-ritonavir, ondansetron, and promethazine-dextromethorphan.      Tried in Past:  NSAIDS-no  TCA-no  SNRI-no  Anti-convulsants-yes  Muscle Relaxants-no  Opioids-no  Benzodiazepines-yes     4A"s of Opioid Risk Assessment  Activity: Patient has difficulty performing  ADL  Analgesia:  Patient's pain is partially controlled by current medication.   Aberrant Behavior:  reviewed with no aberrant drug seeking/taking behavior     report:  Reviewed and consistent with medication use as prescribed.    Patient denies suicidal or homicidal ideations    Pain interventional therapy-no    Chiropractor -no  Acupuncture - no  TENS unit -no  Massage therapy-no  Spinal decompression -no  Joint replacement -no       Review of Systems   Constitutional: Negative.    HENT: Negative.     Eyes: Negative.    Respiratory: Negative.     Cardiovascular: Negative.    Gastrointestinal: Negative.    Endocrine: Negative.    Genitourinary: Negative.    Musculoskeletal:  Positive for arthralgias, back pain and leg pain (Bilateral lower extremities).   Integumentary:  Negative.   Neurological:  Positive for numbness (Bilateral lower extremities).   Hematological: Negative.    Psychiatric/Behavioral: Negative.               X-Ray Lumbar 4-5 View including Bending Views  Narrative: EXAMINATION:  XR LUMBAR SPINE 4-5 VIEW WITH BENDING VIEWS    CLINICAL HISTORY:  Radiculopathy, lumbar region    TECHNIQUE:  AP, lateral, flexion, extension lumbar spine radiograph    COMPARISON:  None    FINDINGS:  The vertebral body heights and alignment are maintained.  No change in alignment on dynamic imaging.  Mild diffuse degenerative endplate changes with osteophyte formation.  Moderate facet degeneration with sclerosis and osteophyte formation.  Impression: Multilevel degenerative changes.    Electronically signed by: Alfonzo Savage  Date:    08/21/2024  Time:    14:56         Past Medical History:   Diagnosis Date    Abnormal uterine bleeding (AUB) " 04/28/2021    Elevated serum creatinine 07/27/2021    Rechecked at 2.1 following completion of treatment for Hep C.    Hepatitis C     Liver transplant recipient 04/06/2022    Last Assessment & Plan:   Formatting of this note might be different from the original.  OLT 4/5/22  DCD  CIT 8:49  WIT 34 min   Duct to duct  chronic HCV now s/p treatment in 2021 with SVR  With decompensated cirrhosis MELD 26  LFTs trending down       Past Surgical History:   Procedure Laterality Date    CHOLECYSTECTOMY      HERNIA REPAIR      HYSTERECTOMY      LIVER TRANSPLANT      ROBOT-ASSISTED LAPAROSCOPIC HYSTERECTOMY N/A 04/29/2021    Procedure: ROBOTIC HYSTERECTOMY;  Surgeon: Swapna Sow DO;  Location: [facility];  Service: OB/GYN;  Laterality: N/A;    ROBOT-ASSISTED LAPAROSCOPIC SALPINGO-OOPHORECTOMY Bilateral 04/29/2021    Procedure: ROBOTIC SALPINGO-OOPHORECTOMY;  Surgeon: Swapna Sow DO;  Location: [facility] OR;  Service: OB/GYN;  Laterality: Bilateral;    TONSILLECTOMY       Social History     Socioeconomic History    Marital status: Single   Tobacco Use    Smoking status: Never    Smokeless tobacco: Never   Substance and Sexual Activity    Alcohol use: Not Currently    Drug use: Never    Sexual activity: Not Currently     Social Drivers of Health     Financial Resource Strain: Low Risk  (4/23/2024)    Overall Financial Resource Strain (CARDIA)     Difficulty of Paying Living Expenses: Not very hard   Food Insecurity: No Food Insecurity (4/23/2024)    Hunger Vital Sign     Worried About Running Out of Food in the Last Year: Never true     Ran Out of Food in the Last Year: Never true   Transportation Needs: No Transportation Needs (4/23/2024)    PRAPARE - Transportation     Lack of Transportation (Medical): No     Lack of Transportation (Non-Medical): No   Physical Activity: Insufficiently Active (4/23/2024)    Exercise Vital Sign     Days of Exercise per Week: 4 days     Minutes of Exercise per Session: 30 min  "  Stress: No Stress Concern Present (4/23/2024)    [employer] [place] [employer] - Occupational Stress Questionnaire     Feeling of Stress : Not at all   Housing Stability: Unknown (4/23/2024)    Housing Stability Vital Sign     Unable to Pay for Housing in the Last Year: No     Family History   Problem Relation Name Age of Onset    Heart disease Father Father     No Known Problems Mother       Review of patient's allergies indicates:  No Known Allergies      OBJECTIVE:  Vitals:    10/16/24 1500   BP: (!) 172/85   Pulse: 68   Resp: 18     BP (!) 172/85 (BP Location: Right arm, Patient Position: Sitting)   Pulse 68   Resp 18   Ht 5' 4" (1.626 m)   Wt 72.6 kg (160 lb)   BMI 27.46 kg/m²   Physical Exam  Vitals and nursing note reviewed.   Constitutional:       General: She is not in acute distress.     Appearance: Normal appearance. She is not ill-appearing, toxic-appearing or diaphoretic.   HENT:      Head: Normocephalic and atraumatic.      Nose: Nose normal.      Mouth/Throat:      Mouth: Mucous membranes are moist.   Eyes:      Extraocular Movements: Extraocular movements intact.      Pupils: Pupils are equal, round, and reactive to light.   Cardiovascular:      Rate and Rhythm: Normal rate and regular rhythm.      Heart sounds: Normal heart sounds.   Pulmonary:      Effort: Pulmonary effort is normal. No respiratory distress.      Breath sounds: Normal breath sounds. No stridor. No wheezing or rhonchi.   Abdominal:      General: Bowel sounds are normal.      Palpations: Abdomen is soft.   Musculoskeletal:         General: No swelling or deformity.      Cervical back: Normal and normal range of motion. No spasms or tenderness. No pain with movement. Normal range of motion.      Thoracic back: Tenderness and bony tenderness present.      Lumbar back: Spasms, tenderness and bony tenderness present. Decreased range of motion. Negative right straight leg raise test and negative left straight leg raise " test. No scoliosis.      Right lower leg: No edema.      Left lower leg: No edema.      Comments: Lumbar pain with flexion, extension lateral rotation.  Lumbar spinous process tenderness to palpation from L4-S1.   Skin:     General: Skin is warm.   Neurological:      General: No focal deficit present.      Mental Status: She is alert and oriented to person, place, and time. Mental status is at baseline.      Cranial Nerves: No cranial nerve deficit.      Sensory: Sensation is intact. No sensory deficit.      Motor: No weakness.      Coordination: Coordination normal.      Gait: Gait normal.      Deep Tendon Reflexes:      Reflex Scores:       Patellar reflexes are 1+ on the right side and 1+ on the left side.       Achilles reflexes are 1+ on the right side and 1+ on the left side.  Psychiatric:         Mood and Affect: Mood normal.         Behavior: Behavior normal.            ASSESSMENT: 64 y.o. year old female with pain, consistent with     Encounter Diagnoses   Name Primary?    Lumbar radiculopathy, chronic     Encounter for long-term (current) use of medications Yes    Lumbosacral radiculopathy         PLAN:   1. reviewed  2..Addiction, Dependency, Tolerance, Opioid abuse-misuse, Death, Diversion Discussed. Overdose reversal drug Naloxone discussed  3. Opioid contract signed today  4.Refill/ Continue medications for pain control and function       Requested Prescriptions      No prescriptions requested or ordered in this encounter     5.Urine drug screen point of care obtained and consistent with prescribed medications and medication refill date.  Drug screen is to monitor compliance with prescribed opiates and to ensure that there was no misuse/abuse of other non-prescribed opioids or illicit drugs.  From this information I will determine if patient is suitable for opioid therapy  6. Schedule L5-S1 epidural steroid injection and epidurogram under fluoroscopy for lumbosacral radiculopathy  Orders Placed This  Encounter   Procedures    Controlled Substance Monitoring Panel, Random, Urine     Standing Status:   Future     Number of Occurrences:   1     Standing Expiration Date:   12/15/2025     Order Specific Question:   Send normal result to authorizing provider's In Basket if patient is active on MyChart:     Answer:   Yes    POCT Urine Drug Screen ([facility])     Interpretive Information:     Negative:  No drug detected at the cut off level.   Positive:  This result represents presumptive positive for the   tested drug, other substances may yield a positive response other   than the analyte of interest. This result should be utilized for   diagnostic purpose only. Confirmation testing will be performed upon physician request only.       Case Request Operating Room: L5-S1 COLLEEN     Order Specific Question:   Medical Necessity:     Answer:   Medically Non-Urgent [100]     Order Specific Question:   Case classification     Answer:   E - Elective [90]     Order Specific Question:   Positioning:     Answer:   Prone [1003]     Order Specific Question:   Post-Procedure Disposition:     Answer:   PACU [1]     Order Specific Question:   Estimated Length of Stay:     Answer:   0 midnight     Order Specific Question:   Implant Required:     Answer:   No [1001]     Order Specific Question:   Is an on-site pathologist required for this procedure?     Answer:   N/A      7.Indications for this procedure for this specific patient include the following   -History, physical examination and concordant radiological image based diagnostic testing supports medical necessity for an epidural steroid injection  - neurogenic claudication due to central disc pathology, osteophyte complexes, severe degenerative disc disease producing foraminal or central stenosis  - Pt has been in pain for at least 6 weeks and has failed conservative care (e.g. Exercise, physical methods, NSAID/ and or muscle relaxants)   - Pt has no major risk factors for spinal  cancer or contraindicated condition   - Neurogenic claudication and Radicular pain are interfering with functional activity  - Pain is associated with symptoms of nerve root irritation   - Any numbness documented is accompanied with paresthesia   - No evidence of systemic or local infection, bleeding tendency or unstable medical condition   - Epidural provided as part of a comprehensive pain management program  - All repeat injections have at least 80% pain relief and increase functional gain and physical activity, and reduction in reliance on the use of medication and or physical therapy  - Pt has significant functional limitation resulting in diminished quality of life and impaired age appropriate ADL's.   - Diagnostic evaluation has ruled out other causes of pain  - Pt participating in an active rehabilitation program or home exercise program which has been discussed with the patient including heat ice and rest  - No more than 3 epidurals will be done in a 6 month period at the same level with at least 7 days between injections  - MAC is only offered in cases of needle phobia   - Injection done at L5-S1 level which is consistent with patient's dermatomal pain complaint    8.Monitored Anesthesia Care medical necessity authorization request:    Monitor anesthesia request is medically indicated for the scheduled nerve block procedure due to:  - needle phobia and anxiety, placing  the patient at risk during the provided service.  -patient has an ASA class greater than 3 and requires constant presence of an anesthesiologist during the procedure:  -patient has severe problems with muscles and muscle spasticity that makes it hard to lie still  -patient suffers from chronic pain and is unable to function due to  diminished ADLs    9.The planned medically necessary  surgical procedure is performed in a hospital outpatient department and not in an ambulatory surgical center due to:     -there is no geographically  assessable ambulatory surgery center that has the  necessary equipment and fluoroscopy needed for the procedure     -there is no geographically assessable ambulatory surgical center available at which the physician has privileges     -an ASC's  specific  guideline regarding the individuals weight or health conditions that prevent the use of an ASC       -injections must be performed under fluoroscopy image guidance with contrast unless the patient has a documented contrast allergy or pregnancy         The total time spent for evaluation and management on 10/16/2024 including reviewing separately obtained history, performing a medically appropriate exam and evaluation, documenting clinical information in the health record, independently interpreting results and communicating them to the patient/family/caregiver, and ordering medications/tests/procedures was between 15-29 minutes.    The above plan and management options were discussed at length with patient. Patient is in agreement with the above and verbalized understanding. It will be communicated with the referring physician via electronic record, fax, or mail.    Krista Adorno  10/16/2024

## 2024-10-16 NOTE — H&P (VIEW-ONLY)
Chronic Pain - New Consult    Referring Physician: Jamaal Self MD       SUBJECTIVE: Disclaimer: This note has been generated using voice-recognition software. There may be typographical errors that have been missed during proof-reading      Initial encounter:    Nadia Salas presents to the clinic for the evaluation of thoracic and lumbar pain.       64-year-old female presents as a new patient referral from Dr. Self.  Patient is a liver  transplant patient from 2022 at [facility].  Approximately 3 years ago, she developed lower back pain without a precipitating event.  Her pain has progressively worsened and she now complains of lower back pain with radicular symptoms to the bilateral lower extremities and feet.  She notes subjective weakness but denies falling, bowel or bladder involvement.  Her pain is exacerbated with walking, bending and turning in bed.  Nothing has provided significant improvement.  She also complains of thoracic and left intercostal pain of a lesser severity than the lower back pain.  MRI of the lumbar from [facility] September 06/20/2024 revealed L3-4 left foraminal stenosis, L4-5 bilateral stenosis and L5-S1 central disc protrusion and severe left foraminal stenosis.  Based on diagnostic and clinical findings,  she was referred to our clinic for an epidural steroid injection for lumbosacral radiculopathy.      Pain Assessment  Pain Assessment: 0-10  Pain Score:   8  Pain Location: Back  Pain Descriptors: Sharp  Pain Frequency: Constant/continuous  Pain Onset: Awakened from sleep  Aggravating Factors: Bending, Standing, Walking  Pain Intervention(s): Medication (See eMAR), Home medication, Heat applied      Physical Therapy/Home Exercise: no      Pain Medications:  has a current medication list which includes the following prescription(s): aspirin, gabapentin, nifedipine, pantoprazole, tacrolimus, alprazolam, azithromycin, clotrimazole-betamethasone 1-0.05%, metronidazole,  "nirmatrelvir-ritonavir, ondansetron, and promethazine-dextromethorphan.      Tried in Past:  NSAIDS-no  TCA-no  SNRI-no  Anti-convulsants-yes  Muscle Relaxants-no  Opioids-no  Benzodiazepines-yes     4A"s of Opioid Risk Assessment  Activity: Patient has difficulty performing  ADL  Analgesia:  Patient's pain is partially controlled by current medication.   Aberrant Behavior:  reviewed with no aberrant drug seeking/taking behavior     report:  Reviewed and consistent with medication use as prescribed.    Patient denies suicidal or homicidal ideations    Pain interventional therapy-no    Chiropractor -no  Acupuncture - no  TENS unit -no  Massage therapy-no  Spinal decompression -no  Joint replacement -no       Review of Systems   Constitutional: Negative.    HENT: Negative.     Eyes: Negative.    Respiratory: Negative.     Cardiovascular: Negative.    Gastrointestinal: Negative.    Endocrine: Negative.    Genitourinary: Negative.    Musculoskeletal:  Positive for arthralgias, back pain and leg pain (Bilateral lower extremities).   Integumentary:  Negative.   Neurological:  Positive for numbness (Bilateral lower extremities).   Hematological: Negative.    Psychiatric/Behavioral: Negative.               X-Ray Lumbar 4-5 View including Bending Views  Narrative: EXAMINATION:  XR LUMBAR SPINE 4-5 VIEW WITH BENDING VIEWS    CLINICAL HISTORY:  Radiculopathy, lumbar region    TECHNIQUE:  AP, lateral, flexion, extension lumbar spine radiograph    COMPARISON:  None    FINDINGS:  The vertebral body heights and alignment are maintained.  No change in alignment on dynamic imaging.  Mild diffuse degenerative endplate changes with osteophyte formation.  Moderate facet degeneration with sclerosis and osteophyte formation.  Impression: Multilevel degenerative changes.    Electronically signed by: Alfonzo Savage  Date:    08/21/2024  Time:    14:56         Past Medical History:   Diagnosis Date    Abnormal uterine bleeding (AUB) " 04/28/2021    Elevated serum creatinine 07/27/2021    Rechecked at 2.1 following completion of treatment for Hep C.    Hepatitis C     Liver transplant recipient 04/06/2022    Last Assessment & Plan:   Formatting of this note might be different from the original.  OLT 4/5/22  DCD  CIT 8:49  WIT 34 min   Duct to duct  chronic HCV now s/p treatment in 2021 with SVR  With decompensated cirrhosis MELD 26  LFTs trending down       Past Surgical History:   Procedure Laterality Date    CHOLECYSTECTOMY      HERNIA REPAIR      HYSTERECTOMY      LIVER TRANSPLANT      ROBOT-ASSISTED LAPAROSCOPIC HYSTERECTOMY N/A 04/29/2021    Procedure: ROBOTIC HYSTERECTOMY;  Surgeon: Swapna Sow DO;  Location: [facility];  Service: OB/GYN;  Laterality: N/A;    ROBOT-ASSISTED LAPAROSCOPIC SALPINGO-OOPHORECTOMY Bilateral 04/29/2021    Procedure: ROBOTIC SALPINGO-OOPHORECTOMY;  Surgeon: Swapna Sow DO;  Location: [facility] OR;  Service: OB/GYN;  Laterality: Bilateral;    TONSILLECTOMY       Social History     Socioeconomic History    Marital status: Single   Tobacco Use    Smoking status: Never    Smokeless tobacco: Never   Substance and Sexual Activity    Alcohol use: Not Currently    Drug use: Never    Sexual activity: Not Currently     Social Drivers of Health     Financial Resource Strain: Low Risk  (4/23/2024)    Overall Financial Resource Strain (CARDIA)     Difficulty of Paying Living Expenses: Not very hard   Food Insecurity: No Food Insecurity (4/23/2024)    Hunger Vital Sign     Worried About Running Out of Food in the Last Year: Never true     Ran Out of Food in the Last Year: Never true   Transportation Needs: No Transportation Needs (4/23/2024)    PRAPARE - Transportation     Lack of Transportation (Medical): No     Lack of Transportation (Non-Medical): No   Physical Activity: Insufficiently Active (4/23/2024)    Exercise Vital Sign     Days of Exercise per Week: 4 days     Minutes of Exercise per Session: 30 min  "  Stress: No Stress Concern Present (4/23/2024)    [employer] - Occupational Stress Questionnaire     Feeling of Stress : Not at all   Housing Stability: Unknown (4/23/2024)    Housing Stability Vital Sign     Unable to Pay for Housing in the Last Year: No     Family History   Problem Relation Name Age of Onset    Heart disease Father Father     No Known Problems Mother       Review of patient's allergies indicates:  No Known Allergies      OBJECTIVE:  Vitals:    10/16/24 1500   BP: (!) 172/85   Pulse: 68   Resp: 18     BP (!) 172/85 (BP Location: Right arm, Patient Position: Sitting)   Pulse 68   Resp 18   Ht 5' 4" (1.626 m)   Wt 72.6 kg (160 lb)   BMI 27.46 kg/m²   Physical Exam  Vitals and nursing note reviewed.   Constitutional:       General: She is not in acute distress.     Appearance: Normal appearance. She is not ill-appearing, toxic-appearing or diaphoretic.   HENT:      Head: Normocephalic and atraumatic.      Nose: Nose normal.      Mouth/Throat:      Mouth: Mucous membranes are moist.   Eyes:      Extraocular Movements: Extraocular movements intact.      Pupils: Pupils are equal, round, and reactive to light.   Cardiovascular:      Rate and Rhythm: Normal rate and regular rhythm.      Heart sounds: Normal heart sounds.   Pulmonary:      Effort: Pulmonary effort is normal. No respiratory distress.      Breath sounds: Normal breath sounds. No stridor. No wheezing or rhonchi.   Abdominal:      General: Bowel sounds are normal.      Palpations: Abdomen is soft.   Musculoskeletal:         General: No swelling or deformity.      Cervical back: Normal and normal range of motion. No spasms or tenderness. No pain with movement. Normal range of motion.      Thoracic back: Tenderness and bony tenderness present.      Lumbar back: Spasms, tenderness and bony tenderness present. Decreased range of motion. Negative right straight leg raise test and negative left straight leg raise " test. No scoliosis.      Right lower leg: No edema.      Left lower leg: No edema.      Comments: Lumbar pain with flexion, extension lateral rotation.  Lumbar spinous process tenderness to palpation from L4-S1.   Skin:     General: Skin is warm.   Neurological:      General: No focal deficit present.      Mental Status: She is alert and oriented to person, place, and time. Mental status is at baseline.      Cranial Nerves: No cranial nerve deficit.      Sensory: Sensation is intact. No sensory deficit.      Motor: No weakness.      Coordination: Coordination normal.      Gait: Gait normal.      Deep Tendon Reflexes:      Reflex Scores:       Patellar reflexes are 1+ on the right side and 1+ on the left side.       Achilles reflexes are 1+ on the right side and 1+ on the left side.  Psychiatric:         Mood and Affect: Mood normal.         Behavior: Behavior normal.            ASSESSMENT: 64 y.o. year old female with pain, consistent with     Encounter Diagnoses   Name Primary?    Lumbar radiculopathy, chronic     Encounter for long-term (current) use of medications Yes    Lumbosacral radiculopathy         PLAN:   1. reviewed  2..Addiction, Dependency, Tolerance, Opioid abuse-misuse, Death, Diversion Discussed. Overdose reversal drug Naloxone discussed  3. Opioid contract signed today  4.Refill/ Continue medications for pain control and function       Requested Prescriptions      No prescriptions requested or ordered in this encounter     5.Urine drug screen point of care obtained and consistent with prescribed medications and medication refill date.  Drug screen is to monitor compliance with prescribed opiates and to ensure that there was no misuse/abuse of other non-prescribed opioids or illicit drugs.  From this information I will determine if patient is suitable for opioid therapy  6. Schedule L5-S1 epidural steroid injection and epidurogram under fluoroscopy for lumbosacral radiculopathy  Orders Placed This  Encounter   Procedures    Controlled Substance Monitoring Panel, Random, Urine     Standing Status:   Future     Number of Occurrences:   1     Standing Expiration Date:   12/15/2025     Order Specific Question:   Send normal result to authorizing provider's In Basket if patient is active on MyChart:     Answer:   Yes    POCT Urine Drug Screen ([facility] [place])     Interpretive Information:     Negative:  No drug detected at the cut off level.   Positive:  This result represents presumptive positive for the   tested drug, other substances may yield a positive response other   than the analyte of interest. This result should be utilized for   diagnostic purpose only. Confirmation testing will be performed upon physician request only.       Case Request Operating Room: L5-S1 COLLEEN     Order Specific Question:   Medical Necessity:     Answer:   Medically Non-Urgent [100]     Order Specific Question:   Case classification     Answer:   E - Elective [90]     Order Specific Question:   Positioning:     Answer:   Prone [1003]     Order Specific Question:   Post-Procedure Disposition:     Answer:   PACU [1]     Order Specific Question:   Estimated Length of Stay:     Answer:   0 midnight     Order Specific Question:   Implant Required:     Answer:   No [1001]     Order Specific Question:   Is an on-site pathologist required for this procedure?     Answer:   N/A      7.Indications for this procedure for this specific patient include the following   -History, physical examination and concordant radiological image based diagnostic testing supports medical necessity for an epidural steroid injection  - neurogenic claudication due to central disc pathology, osteophyte complexes, severe degenerative disc disease producing foraminal or central stenosis  - Pt has been in pain for at least 6 weeks and has failed conservative care (e.g. Exercise, physical methods, NSAID/ and or muscle relaxants)   - Pt has no major risk factors for spinal  cancer or contraindicated condition   - Neurogenic claudication and Radicular pain are interfering with functional activity  - Pain is associated with symptoms of nerve root irritation   - Any numbness documented is accompanied with paresthesia   - No evidence of systemic or local infection, bleeding tendency or unstable medical condition   - Epidural provided as part of a comprehensive pain management program  - All repeat injections have at least 80% pain relief and increase functional gain and physical activity, and reduction in reliance on the use of medication and or physical therapy  - Pt has significant functional limitation resulting in diminished quality of life and impaired age appropriate ADL's.   - Diagnostic evaluation has ruled out other causes of pain  - Pt participating in an active rehabilitation program or home exercise program which has been discussed with the patient including heat ice and rest  - No more than 3 epidurals will be done in a 6 month period at the same level with at least 7 days between injections  - MAC is only offered in cases of needle phobia   - Injection done at L5-S1 level which is consistent with patient's dermatomal pain complaint    8.Monitored Anesthesia Care medical necessity authorization request:    Monitor anesthesia request is medically indicated for the scheduled nerve block procedure due to:  - needle phobia and anxiety, placing  the patient at risk during the provided service.  -patient has an ASA class greater than 3 and requires constant presence of an anesthesiologist during the procedure:  -patient has severe problems with muscles and muscle spasticity that makes it hard to lie still  -patient suffers from chronic pain and is unable to function due to  diminished ADLs    9.The planned medically necessary  surgical procedure is performed in a hospital outpatient department and not in an ambulatory surgical center due to:     -there is no geographically  assessable ambulatory surgery center that has the  necessary equipment and fluoroscopy needed for the procedure     -there is no geographically assessable ambulatory surgical center available at which the physician has privileges     -an ASC's  specific  guideline regarding the individuals weight or health conditions that prevent the use of an ASC       -injections must be performed under fluoroscopy image guidance with contrast unless the patient has a documented contrast allergy or pregnancy         The total time spent for evaluation and management on 10/16/2024 including reviewing separately obtained history, performing a medically appropriate exam and evaluation, documenting clinical information in the health record, independently interpreting results and communicating them to the patient/family/caregiver, and ordering medications/tests/procedures was between 15-29 minutes.    The above plan and management options were discussed at length with patient. Patient is in agreement with the above and verbalized understanding. It will be communicated with the referring physician via electronic record, fax, or mail.    Krista Adorno  10/16/2024

## 2024-10-16 NOTE — PATIENT INSTRUCTIONS
YOU ARE SCHEDULED ON 11/12/2024 AT 9:40 AM FOR YOUR PROCEDURE- L5-S1 COLLEEN WITH  .        Procedure Instructions:    Nothing to eat or drink for 8 hours or after midnight including gum, candy, mints, or tobacco products.  If you are scheduled for 1:30 or later nothing to eat or drink after 5 a.m. the morning of the procedure, including gum, candy, mints, or tobacco products.  Must have a  at least 18 yrs of age to stay present at all times  No Diabetic medications the morning of procedure, check blood sugar the morning of procedure, if it is greater than 200 call the office at [phone]  If you are started on antibiotics or have been prescribed antibiotics, have a fever, or have any other type of infection call to reschedule procedure.  If you take blood pressure medications you can take it at your regular scheduled time with a small sip of WATER!  Dentures are to be removed prior to procedure or we can provide you with a denture cup to remove them prior to being taken back for procedure.   False eyelashes are to be removed before the morning of procedure.    Contacts will have to be removed prior to procedure.  No jewelry is to be worn to the procedure.     HOLD ASPIRIN AND ASPIRIN PRODUCTS  (ASPIRIN, BC POWDER ETC. ) FOR 7 DAYS  PRIOR TO PROCEDURE  HOLD NSAIDS( ibuprofen, mobic, meloxicam, advil, diclofenac, naproxen, relafen, celebrex,  methotrexate, aleve etc....)  FOR 3 DAYS   PRIOR TO PROCEDURE        SIGNATURE:_________________________________________________________________________________________________

## 2024-11-12 ENCOUNTER — ANESTHESIA (OUTPATIENT)
Dept: PAIN MEDICINE | Facility: HOSPITAL | Age: 64
End: 2024-11-12
Payer: MEDICARE

## 2024-11-12 ENCOUNTER — HOSPITAL ENCOUNTER (OUTPATIENT)
Facility: HOSPITAL | Age: 64
Discharge: HOME | End: 2024-11-12
Attending: PAIN MEDICINE | Admitting: PAIN MEDICINE
Payer: MEDICARE

## 2024-11-12 ENCOUNTER — ANESTHESIA EVENT (OUTPATIENT)
Dept: PAIN MEDICINE | Facility: HOSPITAL | Age: 64
End: 2024-11-12
Payer: MEDICARE

## 2024-11-12 VITALS
RESPIRATION RATE: 14 BRPM | HEIGHT: 61 IN | SYSTOLIC BLOOD PRESSURE: 158 MMHG | TEMPERATURE: 98 F | DIASTOLIC BLOOD PRESSURE: 75 MMHG | HEART RATE: 60 BPM | OXYGEN SATURATION: 100 % | WEIGHT: 156.38 LBS | BODY MASS INDEX: 29.52 KG/M2

## 2024-11-12 DIAGNOSIS — M54.17 LUMBOSACRAL RADICULOPATHY: ICD-10-CM

## 2024-11-12 PROCEDURE — 63600175 PHARM REV CODE 636 W HCPCS: Performed by: NURSE ANESTHETIST, CERTIFIED REGISTERED

## 2024-11-12 PROCEDURE — 37000008 HC ANESTHESIA 1ST 15 MINUTES: Performed by: PAIN MEDICINE

## 2024-11-12 PROCEDURE — 62323 NJX INTERLAMINAR LMBR/SAC: CPT | Performed by: PAIN MEDICINE

## 2024-11-12 PROCEDURE — 62323 NJX INTERLAMINAR LMBR/SAC: CPT | Mod: ,,, | Performed by: PAIN MEDICINE

## 2024-11-12 PROCEDURE — D9220A PRA ANESTHESIA: Mod: ,,, | Performed by: NURSE ANESTHETIST, CERTIFIED REGISTERED

## 2024-11-12 PROCEDURE — 25500020 PHARM REV CODE 255: Performed by: PAIN MEDICINE

## 2024-11-12 PROCEDURE — 63600175 PHARM REV CODE 636 W HCPCS: Performed by: PAIN MEDICINE

## 2024-11-12 RX ORDER — IOPAMIDOL 612 MG/ML
INJECTION, SOLUTION INTRAVASCULAR CODE/TRAUMA/SEDATION MEDICATION
Status: DISCONTINUED | OUTPATIENT
Start: 2024-11-12 | End: 2024-11-12 | Stop reason: HOSPADM

## 2024-11-12 RX ORDER — TRIAMCINOLONE ACETONIDE 40 MG/ML
INJECTION, SUSPENSION INTRA-ARTICULAR; INTRAMUSCULAR CODE/TRAUMA/SEDATION MEDICATION
Status: DISCONTINUED | OUTPATIENT
Start: 2024-11-12 | End: 2024-11-12 | Stop reason: HOSPADM

## 2024-11-12 RX ORDER — LIDOCAINE HYDROCHLORIDE 20 MG/ML
INJECTION, SOLUTION EPIDURAL; INFILTRATION; INTRACAUDAL; PERINEURAL
Status: DISCONTINUED | OUTPATIENT
Start: 2024-11-12 | End: 2024-11-12

## 2024-11-12 RX ORDER — SODIUM CHLORIDE 9 MG/ML
INJECTION, SOLUTION INTRAVENOUS CONTINUOUS
Status: DISCONTINUED | OUTPATIENT
Start: 2024-11-12 | End: 2024-11-12 | Stop reason: HOSPADM

## 2024-11-12 RX ORDER — PROPOFOL 10 MG/ML
VIAL (ML) INTRAVENOUS
Status: DISCONTINUED | OUTPATIENT
Start: 2024-11-12 | End: 2024-11-12

## 2024-11-12 RX ADMIN — PROPOFOL 100 MG: 10 INJECTION, EMULSION INTRAVENOUS at 11:11

## 2024-11-12 RX ADMIN — LIDOCAINE HYDROCHLORIDE 80 MG: 20 INJECTION, SOLUTION EPIDURAL; INFILTRATION; INTRACAUDAL; PERINEURAL at 11:11

## 2024-11-12 RX ADMIN — PROPOFOL 75 MG: 10 INJECTION, EMULSION INTRAVENOUS at 12:11

## 2024-11-12 NOTE — PLAN OF CARE
REFER TO WRITTEN DOCUMENT AND RECOVERY INFORMATION.    D/CD PATIENT VIAA WHEELCHAIR AT 1252.    INFORMED PATIENT IF UNABLE TO VOID IN 8 HOURS, GO TO ER. NOTIFY MD OF REDNESS OR DRAINAGE FROM INJECTION SITE OR FEVER OVER 3-4 DAY. REST AND DRINK PLENTY OF FLUIDS FOR THE REMAINDER OF THE DAY. NO LIFTING OVER 5 LBS FOR THE REMAINDER OF THE DAY. CONTINUE REGULAR MEDICATIONS AS PRESCRIBED. MAY TAKE PAIN MEDICATION AS PRESCRIBED.     PAIN IMPROVED  100%  PRE OP PAIN 8.  POSTOP PAIN 0.

## 2024-11-12 NOTE — OP NOTE
"Procedure Note    Procedure Date: 11/12/2024    Procedure Performed:  Lumbar interlaminar epidural steroid injection under fluoroscopy at L5-S1    Indications: Patient failed conservative therapy.      Pre-op diagnosis: Lumbar Radiculopathy    Post-op diagnosis: same    Physician: Krista Adorno MD    Anesthesia: MAC    Medications injected: Kenalog 40mg,  2 mL sterile preservative-free normal saline.    Local anesthetic used: 1% Lidocaine, 3 ml    Estimated Blood Loss: None    Complications:  None    Technique:  The patient was interviewed in the holding area and Risks/Benefits were discussed, including, but not limited to, the possibility of new or different pain, bleeding or infection.   All questions were answered.  The patient understood and accepted risks.  Consent was verified and signed.   A time-out was taken to identify patient and procedure prior to starting the procedure. The patient was placed in the prone position on the fluoroscopy table. The area of the lumbar spine was prepped with Chloraprep and draped in a sterile manner. The L5-S1  interspace was identified and marked under AP fluoroscopy. The skin and subcutaneous tissues overlying the targeted interspace were anesthetized with 3-5 mL of 1% lidocaine using a 25G 1.5" needle.  A 20G  3.5" Tuohy epidural needle was directed toward the interspace under fluoroscopic guidance until the ligamentum flavum was engaged. From this point, a loss of resistance technique with a pulsator syringe a was used to identify entrance of the needle into the epidural space. Once loss of resistance was observed 3mL of Isovue contrast solution was injected. An appropriate epidurogram was noted.  A 3mL mixture consisting of saline and 40 mg of kenalog was injected slowly and without resistance.  The needle was  removed and a sterile Band-Aid dressing was applied to the puncture site.  The patient tolerated the procedure well and was transferred to the .AC. in stable " condition.  The patient was monitored after the procedure and was given post-procedure and discharge instructions to follow at home. The patient was discharged in a stable condition and accompanied by an adult .    Epidurogram:5 mL allotment of Isovue M 300 contrast revealed excellent delineation from L4-S1. There were no filling defects or obstruction to dye flow noted.  There was no  intravascular or intrathecal spread noted with dye flow.

## 2024-11-12 NOTE — DISCHARGE SUMMARY
[facility] - Pain Management  Discharge Note  Short Stay    Procedure(s) (LRB):  L5-S1 COLLEEN (Bilateral)      OUTCOME: Patient tolerated treatment/procedure well without complication and is now ready for discharge.    DISPOSITION: Home or Self Care    FINAL DIAGNOSIS:  Lumbosacral radiculopathy    FOLLOWUP: In clinic    DISCHARGE INSTRUCTIONS:  See nurse's notes     TIME SPENT ON DISCHARGE: 5 minutes

## 2024-11-12 NOTE — BRIEF OP NOTE
The  Discharge Note  Short Stay    Admit Date: 11/12/2024    Discharge Date: 11/12/2024    Attending Physician: Krista Adorno     Discharge Provider: Krista Adorno    Diagnosis: Lumbosacral radiculopathy    Procedure performed: L5-S1 epidural steroid injection and epiduralgram    Findings: Procedure tolerated well and without complications. Consistent with diagnosis.    EBL: 0cc    Specimens: None    Discharged Condition: Good    Final Diagnoses: Lumbosacral radiculopathy [M54.17]    Disposition: Home or Self Care    Hospital Course: No complications, uneventful    Outcome of Hospitalization, Treatment, Procedure, or Surgery:  Patient was admitted for outpatient interventional pain management procedure. The patient tolerated the procedure well with no complications.    Follow up/Patient Instructions:  Follow up as scheduled in Pain Management office in 3-4 weeks.  Patient has received instructions and follow up date and time.    Medications:  Continue previous medications

## 2024-11-12 NOTE — DISCHARGE INSTRUCTIONS
No driving, operating machinery, making legal decisions, or signing legal documents until tomorrow.   Continue diet as tolerated. Drink plenty of fluids and rest.   If unable to void in 8 hours proceed to nearest ER.   Notify MD of redness or drainage from incision site as well as any fever over the next 3-4 days.  No lifting over 5 lbs for the next 24 hrs.   Continue medications as prescribed. May take pain medication as prescribed.   May shower tomorrow. No tub baths for 48 hours following procedure.   May remove bandaids tomorrow, if they fall off before tomorrow they do not have to be replaced.    If you experience any uncontrolled pain, nausea or vomiting after your procedure, do not hesitate to call the clinic.   MED

## 2024-11-12 NOTE — TRANSFER OF CARE
"Anesthesia Transfer of Care Note    Patient: Nadia Salas    Procedure(s) Performed: Procedure(s) (LRB):  L5-S1 COLLEEN (Bilateral)    Patient location: Other: [facility]    Anesthesia Type: general    Transport from OR: Transported from OR on room air with adequate spontaneous ventilation    Post pain: adequate analgesia    Post assessment: no apparent anesthetic complications    Post vital signs: stable    Level of consciousness: sedated and responds to stimulation    Nausea/Vomiting: no nausea/vomiting    Complications: none    Transfer of care protocol was followedComments: Good SV continue, NAD noted, VSS, RTRN      Last vitals: Visit Vitals  BP (!) 180/75 (BP Location: Right arm, Patient Position: Lying)   Pulse 75   Temp 36.6 °C (97.8 °F) (Oral)   Resp 17   Ht 5' 1" (1.549 m)   Wt 70.9 kg (156 lb 6.4 oz)   SpO2 98%   BMI 29.55 kg/m²     "

## 2024-11-12 NOTE — ANESTHESIA PREPROCEDURE EVALUATION
11/12/2024  Nadia Salas is a 64 y.o., female.      Pre-op Assessment    I have reviewed the Patient Summary Reports.     I have reviewed the Nursing Notes. I have reviewed the NPO Status.   I have reviewed the Medications.     Review of Systems  Anesthesia Hx:  No problems with previous Anesthesia   History of prior surgery of interest to airway management or planning:          Denies Family Hx of Anesthesia complications.    Denies Personal Hx of Anesthesia complications.                    Cardiovascular:     Hypertension                                    Hypertension         Renal/:  Chronic Renal Disease        Kidney Function/Disease             Hepatic/GI:      Liver Disease, Hepatitis           Liver Disease, Hepatitis          Active Ambulatory Problems     Diagnosis Date Noted    Encounter for long-term (current) use of high-risk medication 04/28/2021    History of hepatitis C 05/01/2021    S/P hysterectomy 05/01/2021    Abdominal ascites 05/01/2021    Elevated serum creatinine 07/27/2021    Chronic kidney disease, stage 3b 04/05/2022    Liver transplant recipient 04/06/2022    Personal history of other infectious and parasitic diseases 04/04/2022    Personal history of nicotine dependence 01/01/2011    History of uterine cancer 01/01/2021    Hypertensive chronic kidney disease w stg 1-4/unsp chr kdny 01/01/2022    Chronic kidney disease 07/14/2021    CMV (cytomegalovirus infection) status positive 04/05/2022    Endometrial adenocarcinoma 07/12/2021    History of endometrial cancer 12/10/2021    Hyponatremia 12/10/2021    Nausea vomiting and diarrhea 06/23/2022    Leukopenia 06/23/2022    Lumbar radicular pain 04/26/2022    Lytic bone lesion of hip 12/10/2021    Portal hypertension 03/15/2022    Prophylactic antibiotic 04/06/2022    Right upper quadrant abdominal pain 06/23/2022     Umbilical hernia without obstruction and without gangrene 12/12/2022    Wheezing 07/18/2023    Acute cough 07/18/2023    Acute bronchitis 07/18/2023    Immunosuppression 09/15/2024    Thrombocytopenia 09/15/2024    Other secondary hypertension 09/15/2024     Resolved Ambulatory Problems     Diagnosis Date Noted    Abnormal uterine bleeding (AUB) 04/28/2021    Acute renal failure with acute cortical necrosis 04/05/2022    Post-op pain 04/06/2022     Past Medical History:   Diagnosis Date    Hepatitis C     Review of patient's allergies indicates:  No Known Allergies   No current facility-administered medications on file prior to encounter.     Current Outpatient Medications on File Prior to Encounter   Medication Sig Dispense Refill    aspirin (ECOTRIN) 81 MG EC tablet Take 81 mg by mouth.      clotrimazole-betamethasone 1-0.05% (LOTRISONE) cream Apply topically 2 (two) times daily. For the rash on hand 45 g 5    gabapentin (NEURONTIN) 300 MG capsule Take 1 capsule (300 mg total) by mouth 3 (three) times daily. 90 capsule 11    metroNIDAZOLE (METROGEL) 0.75 % gel Apply topically 2 (two) times daily. For face 45 g 11    NIFEdipine (PROCARDIA-XL) 30 MG (OSM) 24 hr tablet Take 1 tablet (30 mg total) by mouth once daily. 90 tablet 3    ondansetron (ZOFRAN-ODT) 4 MG TbDL Take 1 tablet (4 mg total) by mouth every 8 (eight) hours as needed (nausea). 30 tablet 11    pantoprazole (PROTONIX) 40 MG tablet Take 1 tablet by mouth once daily.      promethazine-dextromethorphan (PROMETHAZINE-DM) 6.25-15 mg/5 mL Syrp Take 5 mLs by mouth every 6 (six) hours as needed. (Patient not taking: Reported on 10/16/2024) 118 mL 0    tacrolimus (PROGRAF) 1 MG Cap Take by mouth. Take 2 mg in the morning 3 mg at night        Past Surgical History:   Procedure Laterality Date    CHOLECYSTECTOMY      HERNIA REPAIR      HYSTERECTOMY      LIVER TRANSPLANT      ROBOT-ASSISTED LAPAROSCOPIC HYSTERECTOMY N/A 04/29/2021    Procedure: ROBOTIC  HYSTERECTOMY;  Surgeon: Swapna Sow DO;  Location: [facility];  Service: OB/GYN;  Laterality: N/A;    ROBOT-ASSISTED LAPAROSCOPIC SALPINGO-OOPHORECTOMY Bilateral 04/29/2021    Procedure: ROBOTIC SALPINGO-OOPHORECTOMY;  Surgeon: Swapna Sow DO;  Location: [facility] OR;  Service: OB/GYN;  Laterality: Bilateral;    TONSILLECTOMY          Physical Exam  General: Well nourished    Airway:  Mallampati: II   Mouth Opening: Normal  TM Distance: Normal, at least 6 cm  Tongue: Normal  Neck ROM: Normal ROM        Anesthesia Plan  Type of Anesthesia, risks & benefits discussed:    Anesthesia Type: MAC  Intra-op Monitoring Plan: Standard ASA Monitors  Post Op Pain Control Plan: multimodal analgesia  Induction:  IV  Informed Consent: Informed consent signed with the Patient and all parties understand the risks and agree with anesthesia plan.  All questions answered. Patient consented to blood products? No  ASA Score: 3  Day of Surgery Review of History & Physical: H&P Update referred to the surgeon/provider.I have interviewed and examined the patient. I have reviewed the patient's H&P dated: There are no significant changes.     Ready For Surgery From Anesthesia Perspective.     .

## 2024-11-13 NOTE — ANESTHESIA POSTPROCEDURE EVALUATION
Anesthesia Post Evaluation    Patient: Nadia Salas    Procedure(s) Performed: Procedure(s) (LRB):  L5-S1 COLLEEN (Bilateral)    Final Anesthesia Type: general      [facility] location: [facility]  Patient participation: Yes- Able to Participate  Level of consciousness: awake and alert  Post-procedure vital signs: reviewed and stable  Pain management: adequate  Airway patency: patent    PONV status at discharge: No PONV  Anesthetic complications: no      Cardiovascular status: blood pressure returned to baseline, hemodynamically stable and stable  Respiratory status: unassisted  Hydration status: euvolemic  Follow-up not needed.  Comments: Pt voices appreciation for care              Vitals Value Taken Time   /75 11/12/24 1239   Temp 97.5 11/13/24 1439   Pulse 57 11/12/24 1239   Resp 15 11/12/24 1239   SpO2 100 % 11/12/24 1239   Vitals shown include unfiled device data.      Event Time   Out of Recovery 12:36:00         Pain/Irma Score: Irma Score: 10 (11/12/2024 12:20 PM)

## 2024-11-13 NOTE — PROGRESS NOTES
Subjective:         Patient ID: Nadia Salas is a 64 y.o. female.    Chief Complaint: Follow-up      Pain  This is a chronic problem. The current episode started more than 1 year ago. The problem occurs daily. The problem has been waxing and waning. Associated symptoms include arthralgias. Pertinent negatives include no anorexia, change in bowel habit, chest pain, chills, coughing, diaphoresis, fever, neck pain, rash, sore throat, swollen glands, urinary symptoms, vertigo or vomiting.     Review of Systems   Constitutional:  Negative for activity change, appetite change, chills, diaphoresis, fever and unexpected weight change.   HENT:  Negative for drooling, ear discharge, ear pain, facial swelling, nosebleeds, sore throat, trouble swallowing, voice change and goiter.    Eyes:  Negative for photophobia, pain, discharge, redness and visual disturbance.   Respiratory:  Negative for apnea, cough, choking, chest tightness, shortness of breath, wheezing and stridor.    Cardiovascular:  Negative for chest pain, palpitations and leg swelling.   Gastrointestinal:  Negative for abdominal distention, anorexia, change in bowel habit, diarrhea, rectal pain, vomiting and fecal incontinence.   Endocrine: Negative for cold intolerance, heat intolerance, polydipsia, polyphagia and polyuria.   Genitourinary:  Negative for bladder incontinence, dysuria, flank pain, frequency and hot flashes.   Musculoskeletal:  Positive for arthralgias, back pain and leg pain. Negative for neck pain.   Integumentary:  Negative for color change, pallor and rash.   Allergic/Immunologic: Negative for immunocompromised state.   Neurological:  Negative for dizziness, vertigo, seizures, syncope, facial asymmetry, speech difficulty, light-headedness, memory loss and coordination difficulties.   Hematological:  Negative for adenopathy. Does not bruise/bleed easily.   Psychiatric/Behavioral:  Negative for agitation, behavioral problems, confusion,  decreased concentration, dysphoric mood, hallucinations, self-injury and suicidal ideas. The patient is not nervous/anxious and is not hyperactive.            Past Medical History:   Diagnosis Date    Abnormal uterine bleeding (AUB) 04/28/2021    Elevated serum creatinine 07/27/2021    Rechecked at 2.1 following completion of treatment for Hep C.    Hepatitis C     Liver transplant recipient 04/06/2022    Last Assessment & Plan:   Formatting of this note might be different from the original.  OLT 4/5/22  DCD  CIT 8:49  WIT 34 min   Duct to duct  chronic HCV now s/p treatment in 2021 with SVR  With decompensated cirrhosis MELD 26  LFTs trending down       Past Surgical History:   Procedure Laterality Date    CHOLECYSTECTOMY      EPIDURAL STEROID INJECTION INTO LUMBAR SPINE Bilateral 11/12/2024    Procedure: L5-S1 COLLEEN;  Surgeon: Krista Adorno MD;  Location: [facility];  Service: Pain Management;  Laterality: Bilateral;    HERNIA REPAIR      HYSTERECTOMY      LIVER TRANSPLANT      ROBOT-ASSISTED LAPAROSCOPIC HYSTERECTOMY N/A 04/29/2021    Procedure: ROBOTIC HYSTERECTOMY;  Surgeon: Swapna Sow DO;  Location: [facility] OR;  Service: OB/GYN;  Laterality: N/A;    ROBOT-ASSISTED LAPAROSCOPIC SALPINGO-OOPHORECTOMY Bilateral 04/29/2021    Procedure: ROBOTIC SALPINGO-OOPHORECTOMY;  Surgeon: Swapna Sow DO;  Location: [facility] OR;  Service: OB/GYN;  Laterality: Bilateral;    TONSILLECTOMY       Social History     Socioeconomic History    Marital status: Single   Tobacco Use    Smoking status: Never    Smokeless tobacco: Never   Substance and Sexual Activity    Alcohol use: Not Currently    Drug use: Never    Sexual activity: Not Currently     Social Drivers of Health     Financial Resource Strain: Low Risk  (4/23/2024)    Overall Financial Resource Strain (CARDIA)     Difficulty of Paying Living Expenses: Not very hard   Food Insecurity: No Food Insecurity (4/23/2024)    Hunger Vital Sign      "Worried About Running Out of Food in the Last Year: Never true     Ran Out of Food in the Last Year: Never true   Transportation Needs: No Transportation Needs (4/23/2024)    PRAPARE - Transportation     Lack of Transportation (Medical): No     Lack of Transportation (Non-Medical): No   Physical Activity: Insufficiently Active (4/23/2024)    Exercise Vital Sign     Days of Exercise per Week: 4 days     Minutes of Exercise per Session: 30 min   Stress: No Stress Concern Present (4/23/2024)    [employer] [place] [employer] - Occupational Stress Questionnaire     Feeling of Stress : Not at all   Housing Stability: Unknown (4/23/2024)    Housing Stability Vital Sign     Unable to Pay for Housing in the Last Year: No     Family History   Problem Relation Name Age of Onset    Heart disease Father Father     No Known Problems Mother       Review of patient's allergies indicates:  No Known Allergies     Objective:  Vitals:    11/26/24 1424   BP: 138/84   Pulse: 84   Resp: 20   Weight: 72.1 kg (159 lb)   Height: 5' 4" (1.626 m)   PainSc: 0-No pain         Physical Exam  Vitals and nursing note reviewed. Exam conducted with a chaperone present.   Constitutional:       General: She is awake. She is not in acute distress.     Appearance: Normal appearance. She is not ill-appearing, toxic-appearing or diaphoretic.   HENT:      Head: Normocephalic and atraumatic.      Nose: Nose normal.      Mouth/Throat:      Mouth: Mucous membranes are moist.      Pharynx: Oropharynx is clear.   Eyes:      Conjunctiva/sclera: Conjunctivae normal.      Pupils: Pupils are equal, round, and reactive to light.   Cardiovascular:      Rate and Rhythm: Normal rate.   Pulmonary:      Effort: Pulmonary effort is normal. No respiratory distress.   Abdominal:      Palpations: Abdomen is soft.      Tenderness: There is no guarding.   Musculoskeletal:         General: Normal range of motion.      Cervical back: Normal range of motion and neck " supple. No rigidity.   Skin:     General: Skin is warm and dry.      Coloration: Skin is not jaundiced or pale.   Neurological:      General: No focal deficit present.      Mental Status: She is alert and oriented to person, place, and time. Mental status is at baseline.      Cranial Nerves: No cranial nerve deficit (II-XII).   Psychiatric:         Mood and Affect: Mood normal.         Behavior: Behavior normal. Behavior is cooperative.         Thought Content: Thought content normal.           FL Fluoro for Pain Management  See OP Notes for results.     IMPRESSION: See OP Notes for results.     This procedure was auto-finalized by: Virtual Radiologist       Lab Visit on 11/08/2024   Component Date Value Ref Range Status    Phosphorus 11/08/2024 3.5  2.5 - 4.5 mg/dL Final    Magnesium 11/08/2024 1.9  1.7 - 2.3 mg/dL Final    GGT 11/08/2024 11  5 - 55 U/L Final    Sodium 11/08/2024 130 (L)  136 - 145 mmol/L Final    Potassium 11/08/2024 4.9  3.5 - 5.1 mmol/L Final    Chloride 11/08/2024 99  98 - 107 mmol/L Final    CO2 11/08/2024 26  21 - 32 mmol/L Final    Anion Gap 11/08/2024 10  7 - 16 mmol/L Final    Glucose 11/08/2024 101  74 - 106 mg/dL Final    BUN 11/08/2024 16  7 - 18 mg/dL Final    Creatinine 11/08/2024 1.29 (H)  0.55 - 1.02 mg/dL Final    BUN/Creatinine Ratio 11/08/2024 12  6 - 20 Final    Calcium 11/08/2024 8.9  8.5 - 10.1 mg/dL Final    Total Protein 11/08/2024 7.4  6.4 - 8.2 g/dL Final    Albumin 11/08/2024 3.5  3.5 - 5.0 g/dL Final    Globulin 11/08/2024 3.9  2.0 - 4.0 g/dL Final    A/G Ratio 11/08/2024 0.9   Final    Bilirubin, Total 11/08/2024 0.5  >0.0 - 1.2 mg/dL Final    Alk Phos 11/08/2024 104  55 - 142 U/L Final    ALT 11/08/2024 13  13 - 56 U/L Final    AST 11/08/2024 14 (L)  15 - 37 U/L Final    eGFR 11/08/2024 46 (L)  >=60 mL/min/1.73m2 Final    Bilirubin, Direct 11/08/2024 <0.1  0.0 - 0.2 mg/dL Final    WBC 11/08/2024 2.85 (L)  4.50 - 11.00 K/uL Final    RBC 11/08/2024 4.35  4.20 - 5.40 M/uL  Final    Hemoglobin 11/08/2024 12.8  12.0 - 16.0 g/dL Final    Hematocrit 11/08/2024 39.0  38.0 - 47.0 % Final    MCV 11/08/2024 89.7  80.0 - 96.0 fL Final    MCH 11/08/2024 29.4  27.0 - 31.0 pg Final    MCHC 11/08/2024 32.8  32.0 - 36.0 g/dL Final    RDW 11/08/2024 13.0  11.5 - 14.5 % Final    Platelet Count 11/08/2024 117 (L)  150 - 400 K/uL Final    MPV 11/08/2024 10.5  9.4 - 12.4 fL Final    Neutrophils % 11/08/2024 59.5  53.0 - 65.0 % Final    Lymphocytes % 11/08/2024 30.9  27.0 - 41.0 % Final    Monocytes % 11/08/2024 6.0  2.0 - 6.0 % Final    Eosinophils % 11/08/2024 2.5  1.0 - 4.0 % Final    Basophils % 11/08/2024 0.7  0.0 - 1.0 % Final    Immature Granulocytes % 11/08/2024 0.4  0.0 - 0.4 % Final    nRBC, Auto 11/08/2024 0.0  <=0.0 % Final    Neutrophils, Abs 11/08/2024 1.70 (L)  1.80 - 7.70 K/uL Final    Lymphocytes, Absolute 11/08/2024 0.88 (L)  1.00 - 4.80 K/uL Final    Monocytes, Absolute 11/08/2024 0.17  0.00 - 0.80 K/uL Final    Eosinophils, Absolute 11/08/2024 0.07  0.00 - 0.50 K/uL Final    Basophils, Absolute 11/08/2024 0.02  0.00 - 0.20 K/uL Final    Immature Granulocytes, Absolute 11/08/2024 0.01  0.00 - 0.04 K/uL Final    nRBC, Absolute 11/08/2024 0.00  <=0.00 x10e3/uL Final    Diff Type 11/08/2024 Auto   Final    Tacrolimus 11/08/2024 5.0  5.0-15.0 (Trough) ng/mL Final   Office Visit on 10/16/2024   Component Date Value Ref Range Status    POC Amphetamines 10/16/2024 Negative  Negative, Inconclusive Final    POC Barbiturates 10/16/2024 Negative  Negative, Inconclusive Final    POC Benzodiazepines 10/16/2024 Negative  Negative, Inconclusive Final    POC Cocaine 10/16/2024 Negative  Negative, Inconclusive Final    POC THC 10/16/2024 Negative  Negative, Inconclusive Final    POC Methadone 10/16/2024 Negative  Negative, Inconclusive Final    POC Methamphetamine 10/16/2024 Negative  Negative, Inconclusive Final    POC Opiates 10/16/2024 Negative  Negative, Inconclusive Final    POC Oxycodone  10/16/2024 Negative  Negative, Inconclusive Final    POC Phencyclidine 10/16/2024 Negative  Negative, Inconclusive Final    POC Methylenedioxymethamphetamine * 10/16/2024 Negative  Negative, Inconclusive Final    POC Tricyclic Antidepressants 10/16/2024 Negative  Negative, Inconclusive Final    POC Buprenorphine 10/16/2024 Negative   Final     Acceptable 10/16/2024 Yes   Final    POC Temperature (Urine) 10/16/2024 94   Final    Creatinine, U 10/16/2024 15.3  mg/dL Final    Specific Gravity 10/16/2024 1.005   Final    pH 10/16/2024 6.0   Final    Oxidants 10/16/2024 Negative  Cutoff: 200 mg/L Final    Comment 10/16/2024 Normal   Final    Codeine 10/16/2024 Not Detected  Cutoff: 25 ng/mL Final    C6BG 10/16/2024 Not Detected  Cutoff: 100 ng/mL Final    Morphine 10/16/2024 Not Detected  Cutoff: 25 ng/mL Final    M6BG 10/16/2024 Not Detected  Cutoff: 100 ng/mL Final    6 Monoacetylmorphine 10/16/2024 Not Detected  Cutoff: 25 ng/mL Final    Hydrocodone 10/16/2024 Not Detected  Cutoff: 25 ng/mL Final    Norhydrocodone 10/16/2024 Not Detected  Cutoff: 25 ng/mL Final    Dihydrocodeine 10/16/2024 Not Detected  Cutoff: 25 ng/mL Final    Hydromorphone 10/16/2024 Not Detected  Cutoff: 25 ng/mL Final    Hydromorphone-3-beta-glucuronide 10/16/2024 Not Detected  Cutoff: 100 ng/mL Final    Oxycodone 10/16/2024 Not Detected  Cutoff: 25 ng/mL Final    Noroxycodone 10/16/2024 Not Detected  Cutoff: 25 ng/mL Final    Oxymorphone 10/16/2024 Not Detected  Cutoff: 25 ng/mL Final    Oxymorphone-3-beta-glucuronid 10/16/2024 Not Detected  Cutoff: 100 ng/mL Final    Noroxymorphone 10/16/2024 Not Detected  Cutoff: 25 ng/mL Final    Fentanyl 10/16/2024 Not Detected  Cutoff: 2 ng/mL Final    Norfentanyl 10/16/2024 Not Detected  Cutoff: 2 ng/mL Final    Meperidine 10/16/2024 Not Detected  Cutoff: 25 ng/mL Final    Normeperidine 10/16/2024 Not Detected  Cutoff: 25 ng/mL Final    Naloxone 10/16/2024 Not Detected  Cutoff: 25 ng/mL  Final    Naloxone-3-beta-glucuronide 10/16/2024 Not Detected  Cutoff: 100 ng/mL Final    Methadone 10/16/2024 Not Detected  Cutoff: 25 ng/mL Final    EDDP 10/16/2024 Not Detected  Cutoff: 25 ng/mL Final    Propoxyphene 10/16/2024 Not Detected  Cutoff: 25 ng/mL Final    Norpropoxyphene 10/16/2024 Not Detected  Cutoff: 25 ng/mL Final    Tramadol 10/16/2024 Not Detected  Cutoff: 25 ng/mL Final    O-desmethyltramadol 10/16/2024 Not Detected  Cutoff: 25 ng/mL Final    Tapentadol 10/16/2024 Not Detected  Cutoff: 25 ng/mL Final    N-Desmethyltapentadol 10/16/2024 Not Detected  Cutoff: 50 ng/mL Final    M TAPENTADOL-BETA-GLUCURONIDE 10/16/2024 Not Detected  Cutoff: 100 ng/mL Final    Buprenorphine 10/16/2024 Not Detected  Cutoff: 5 ng/mL Final    Norbuprenorphine 10/16/2024 Not Detected  Cutoff: 5 ng/mL Final    Norbuprenorphine glucuronide 10/16/2024 Not Detected  Cutoff: 20 ng/mL Final    Opioid Interpretation 10/16/2024 SEE COMMENTS   Final    Cocaine 10/16/2024 Negative  Cutoff: 150 ng/mL Final    Tetrahydrocannabinol 10/16/2024 Negative  Cutoff: 50 ng/mL Final    Alprazolam 10/16/2024 Not Detected  Cutoff: 10 ng/mL Final    Alpha-Hydroxyalprazolam 10/16/2024 Not Detected  Cutoff: 10 ng/mL Final    Alpha-Hydroxyalprazolam Glucuronide 10/16/2024 Not Detected  Cutoff: 50 ng/mL Final    Chlordiazepoxide 10/16/2024 Not Detected  Cutoff: 10 ng/mL Final    Clobazam 10/16/2024 Not Detected  Cutoff: 10 ng/mL Final    N-Desmethylclobazam 10/16/2024 Not Detected  Cutoff: 200 ng/mL Final    Clonazepam 10/16/2024 Not Detected  Cutoff: 10 ng/mL Final    7-aminoclonazepam 10/16/2024 Not Detected  Cutoff: 10 ng/mL Final    Diazepam 10/16/2024 Not Detected  Cutoff: 10 ng/mL Final    Nordiazepam 10/16/2024 Not Detected  Cutoff: 10 ng/mL Final    Flunitrazepam 10/16/2024 Not Detected  Cutoff: 10 ng/mL Final    7-aminoflunitrazepam 10/16/2024 Not Detected  Cutoff: 10 ng/mL Final    Flurazepam 10/16/2024 Not Detected  Cutoff: 10 ng/mL  Final    2-Hydroxy Ethyl Flurazepam 10/16/2024 Not Detected  Cutoff: 10 ng/mL Final    Lorazepam 10/16/2024 Not Detected  Cutoff: 10 ng/mL Final    Lorazepam Glucuronide 10/16/2024 Not Detected  Cutoff: 50 ng/mL Final    Midazolam 10/16/2024 Not Detected  Cutoff: 10 ng/mL Final    Alpha-Hydroxy Midazolam 10/16/2024 Not Detected  Cutoff: 10 ng/mL Final    Oxazepam 10/16/2024 Not Detected  Cutoff: 10 ng/mL Final    Oxazepam Glucuronide 10/16/2024 Not Detected  Cutoff: 50 ng/mL Final    Prazepam 10/16/2024 Not Detected  Cutoff: 10 ng/mL Final    Temazepam 10/16/2024 Not Detected  Cutoff: 10 ng/mL Final    Temazepam Glucuronide 10/16/2024 Not Detected  Cutoff: 50 ng/mL Final    Triazolam 10/16/2024 Not Detected  Cutoff: 10 ng/mL Final    Alpha-Hydroxy Triazolam 10/16/2024 Not Detected  Cutoff: 10 ng/mL Final    Zolpidem 10/16/2024 Not Detected  Cutoff: 10 ng/mL Final    Zolpidem Phenyl-4-Carboxylic acid 10/16/2024 Not Detected  Cutoff: 10 ng/mL Final    Benzodiazepine Interpretation 10/16/2024 SEE COMMENTS   Final    List Patient's Current Medications 10/16/2024 na   Final    Methamphetamine 10/16/2024 Not Detected  Cutoff: 100 ng/mL Final    Amphetamine 10/16/2024 Not Detected  Cutoff: 100 ng/mL Final    3,4-methylenedioxymethamphetamine * 10/16/2024 Not Detected  Cutoff: 100 ng/mL Final    3,0-wfbtvivkaaftly-E-ethylamphetam* 10/16/2024 Not Detected  Cutoff: 100 ng/mL Final    3,4-methylenedioxyamphetamine (MDA) 10/16/2024 Not Detected  Cutoff: 100 ng/mL Final    Ephedrine 10/16/2024 Not Detected  Cutoff: 100 ng/mL Final    Pseudoephedrine 10/16/2024 Not Detected  Cutoff: 100 ng/mL Final    Phentermine 10/16/2024 Not Detected  Cutoff: 100 ng/mL Final    Phencyclidine (PCP) 10/16/2024 Not Detected  Cutoff: 20 ng/mL Final    Methylphenidate 10/16/2024 Not Detected  Cutoff: 20 ng/mL Final    Ritalinic acid 10/16/2024 Not Detected  Cutoff: 100 ng/mL Final    Stimulant Interpretation 10/16/2024 SEE COMMENTS   Final     Barbiturates 10/16/2024 Negative  Cutoff: 200 ng/mL Final   Office Visit on 09/29/2024   Component Date Value Ref Range Status    POC Rapid COVID 09/29/2024 Positive (A)  Negative Final     Acceptable 09/29/2024 Yes   Final    POC Molecular Influenza A Ag 09/29/2024 Negative  Negative Final    POC Molecular Influenza B Ag 09/29/2024 Negative  Negative Final     Acceptable 09/29/2024 Yes   Final   Lab Visit on 09/13/2024   Component Date Value Ref Range Status    Hemoglobin A1C 09/13/2024 4.8  4.5 - 6.6 % Final    Estimated Average Glucose 09/13/2024 91  mg/dL Final    Phosphorus 09/13/2024 3.8  2.5 - 4.5 mg/dL Final    Magnesium 09/13/2024 1.8  1.7 - 2.3 mg/dL Final    GGT 09/13/2024 15  5 - 55 U/L Final    Sodium 09/13/2024 129 (L)  136 - 145 mmol/L Final    Potassium 09/13/2024 4.9  3.5 - 5.1 mmol/L Final    Chloride 09/13/2024 98  98 - 107 mmol/L Final    CO2 09/13/2024 28  21 - 32 mmol/L Final    Anion Gap 09/13/2024 8  7 - 16 mmol/L Final    Glucose 09/13/2024 113 (H)  74 - 106 mg/dL Final    BUN 09/13/2024 18  7 - 18 mg/dL Final    Creatinine 09/13/2024 1.31 (H)  0.55 - 1.02 mg/dL Final    BUN/Creatinine Ratio 09/13/2024 14  6 - 20 Final    Calcium 09/13/2024 8.8  8.5 - 10.1 mg/dL Final    Total Protein 09/13/2024 7.3  6.4 - 8.2 g/dL Final    Albumin 09/13/2024 3.7  3.5 - 5.0 g/dL Final    Globulin 09/13/2024 3.6  2.0 - 4.0 g/dL Final    A/G Ratio 09/13/2024 1.0   Final    Bilirubin, Total 09/13/2024 0.4  >0.0 - 1.2 mg/dL Final    Alk Phos 09/13/2024 112  55 - 142 U/L Final    ALT 09/13/2024 17  13 - 56 U/L Final    AST 09/13/2024 16  15 - 37 U/L Final    eGFR 09/13/2024 45 (L)  >=60 mL/min/1.73m2 Final    Bilirubin, Direct 09/13/2024 0.1  0.0 - 0.2 mg/dL Final    WBC 09/13/2024 3.04 (L)  4.50 - 11.00 K/uL Final    RBC 09/13/2024 3.95 (L)  4.20 - 5.40 M/uL Final    Hemoglobin 09/13/2024 11.5 (L)  12.0 - 16.0 g/dL Final    Hematocrit 09/13/2024 36.2 (L)  38.0 - 47.0 % Final    MCV  09/13/2024 91.6  80.0 - 96.0 fL Final    MCH 09/13/2024 29.1  27.0 - 31.0 pg Final    MCHC 09/13/2024 31.8 (L)  32.0 - 36.0 g/dL Final    RDW 09/13/2024 13.0  11.5 - 14.5 % Final    Platelet Count 09/13/2024 124 (L)  150 - 400 K/uL Final    MPV 09/13/2024 9.9  9.4 - 12.4 fL Final    Neutrophils % 09/13/2024 62.2  53.0 - 65.0 % Final    Lymphocytes % 09/13/2024 27.0  27.0 - 41.0 % Final    Monocytes % 09/13/2024 7.2 (H)  2.0 - 6.0 % Final    Eosinophils % 09/13/2024 2.3  1.0 - 4.0 % Final    Basophils % 09/13/2024 1.0  0.0 - 1.0 % Final    Immature Granulocytes % 09/13/2024 0.3  0.0 - 0.4 % Final    nRBC, Auto 09/13/2024 0.0  <=0.0 % Final    Neutrophils, Abs 09/13/2024 1.89  1.80 - 7.70 K/uL Final    Lymphocytes, Absolute 09/13/2024 0.82 (L)  1.00 - 4.80 K/uL Final    Monocytes, Absolute 09/13/2024 0.22  0.00 - 0.80 K/uL Final    Eosinophils, Absolute 09/13/2024 0.07  0.00 - 0.50 K/uL Final    Basophils, Absolute 09/13/2024 0.03  0.00 - 0.20 K/uL Final    Immature Granulocytes, Absolute 09/13/2024 0.01  0.00 - 0.04 K/uL Final    nRBC, Absolute 09/13/2024 0.00  <=0.00 x10e3/uL Final    Diff Type 09/13/2024 Auto   Final    Tacrolimus 09/13/2024 2.7 (L)  5.0-15.0 (Trough) ng/mL Final   Office Visit on 08/27/2024   Component Date Value Ref Range Status    Final Diagnosis 08/27/2024    Final                    Value:This result contains rich text formatting which cannot be displayed here.    Comments 08/27/2024    Final                    Value:This result contains rich text formatting which cannot be displayed here.    Specimen A Procedure 08/27/2024 Shave Biopsy   Final    Specimen A Morphology 08/27/2024 irregular brown macule   Final    Specimen A DDx 08/27/2024 nevus r/o atypia   Final    Specimen B Procedure 08/27/2024 Shave Biopsy   Final    Specimen B Morphology 08/27/2024 irregular brown macule   Final    Specimen B DDx 08/27/2024 nevus r/o atypia   Final    Microscopic Description 08/27/2024    Final                     Value:This result contains rich text formatting which cannot be displayed here.    Gross Description 08/27/2024    Final                    Value:This result contains rich text formatting which cannot be displayed here.    Laboratory Notes 08/27/2024    Final                    Value:This result contains rich text formatting which cannot be displayed here.    Case Report 08/27/2024    Final                    Value:Surgical Pathology                                Case: [id number]                                   Authorizing Provider:  Unique Witt MD       Collected:           08/27/2024 05:06 PM          Ordering Location:     [facility] Received:            08/28/2024 08:15 AM          Pathologist:           Hima Vallejo MD                                                      Specimens:   A) - Face, L cheek                                                                                  B) - Other, please specify, mid back                                                      Lab Visit on 08/14/2024   Component Date Value Ref Range Status    Vitamin D 25-Hydroxy, Blood 08/14/2024 40.2  ng/mL Final   Lab Visit on 07/19/2024   Component Date Value Ref Range Status    Phosphorus 07/19/2024 3.5  2.5 - 4.5 mg/dL Final    Magnesium 07/19/2024 1.9  1.7 - 2.3 mg/dL Final    GGT 07/19/2024 16  5 - 55 U/L Final    Sodium 07/19/2024 130 (L)  136 - 145 mmol/L Final    Potassium 07/19/2024 4.5  3.5 - 5.1 mmol/L Final    Chloride 07/19/2024 99  98 - 107 mmol/L Final    CO2 07/19/2024 27  21 - 32 mmol/L Final    Anion Gap 07/19/2024 9  7 - 16 mmol/L Final    Glucose 07/19/2024 112 (H)  74 - 106 mg/dL Final    BUN 07/19/2024 17  7 - 18 mg/dL Final    Creatinine 07/19/2024 1.35 (H)  0.55 - 1.02 mg/dL Final    BUN/Creatinine Ratio 07/19/2024 13  6 - 20 Final    Calcium 07/19/2024 8.7  8.5 - 10.1 mg/dL Final    Total Protein 07/19/2024 7.5  6.4 - 8.2 g/dL Final    Albumin 07/19/2024 3.9  3.5 -  5.0 g/dL Final    Globulin 07/19/2024 3.6  2.0 - 4.0 g/dL Final    A/G Ratio 07/19/2024 1.1   Final    Bilirubin, Total 07/19/2024 0.4  >0.0 - 1.2 mg/dL Final    Alk Phos 07/19/2024 123  55 - 142 U/L Final    ALT 07/19/2024 17  13 - 56 U/L Final    AST 07/19/2024 16  15 - 37 U/L Final    eGFR 07/19/2024 43 (L)  >=60 mL/min/1.73m2 Final    Bilirubin, Direct 07/19/2024 <0.1  0.0 - 0.2 mg/dL Final    WBC 07/19/2024 2.94 (L)  4.50 - 11.00 K/uL Final    RBC 07/19/2024 4.25  4.20 - 5.40 M/uL Final    Hemoglobin 07/19/2024 12.6  12.0 - 16.0 g/dL Final    Hematocrit 07/19/2024 38.2  38.0 - 47.0 % Final    MCV 07/19/2024 89.9  80.0 - 96.0 fL Final    MCH 07/19/2024 29.6  27.0 - 31.0 pg Final    MCHC 07/19/2024 33.0  32.0 - 36.0 g/dL Final    RDW 07/19/2024 13.0  11.5 - 14.5 % Final    Platelet Count 07/19/2024 114 (L)  150 - 400 K/uL Final    MPV 07/19/2024 10.4  9.4 - 12.4 fL Final    Neutrophils % 07/19/2024 63.3  53.0 - 65.0 % Final    Lymphocytes % 07/19/2024 26.2 (L)  27.0 - 41.0 % Final    Monocytes % 07/19/2024 6.5 (H)  2.0 - 6.0 % Final    Eosinophils % 07/19/2024 2.7  1.0 - 4.0 % Final    Basophils % 07/19/2024 1.0  0.0 - 1.0 % Final    Immature Granulocytes % 07/19/2024 0.3  0.0 - 0.4 % Final    nRBC, Auto 07/19/2024 0.0  <=0.0 % Final    Neutrophils, Abs 07/19/2024 1.86  1.80 - 7.70 K/uL Final    Lymphocytes, Absolute 07/19/2024 0.77 (L)  1.00 - 4.80 K/uL Final    Monocytes, Absolute 07/19/2024 0.19  0.00 - 0.80 K/uL Final    Eosinophils, Absolute 07/19/2024 0.08  0.00 - 0.50 K/uL Final    Basophils, Absolute 07/19/2024 0.03  0.00 - 0.20 K/uL Final    Immature Granulocytes, Absolute 07/19/2024 0.01  0.00 - 0.04 K/uL Final    nRBC, Absolute 07/19/2024 0.00  <=0.00 x10e3/uL Final    Diff Type 07/19/2024 Auto   Final    Tacrolimus 07/19/2024 2.7 (L)  5.0-15.0 (Trough) ng/mL Final         No orders of the defined types were placed in this encounter.      Requested Prescriptions      No prescriptions requested or  ordered in this encounter       Assessment:     1. Lumbar radiculopathy, chronic    2. Chronic bilateral thoracic back pain               Plan:    Not using narcotics from our office November 26, 2024    Follows spine surgery [facility]    Follow-up after lumbar L5/S1 COLLEEN # 1 November 12, 2024  Patient states she had 90% relief after procedure  Procedure did help improve her level function    Procedure note/fluoro images reviewed    MRI of the lumbar from [facility] ([place] September 06/20/2024 revealed L3-4 left foraminal stenosis, L4-5 bilateral stenosis and L5-S1 central disc protrusion and severe left foraminal stenosis     She states she has grateful for procedure did help improve her pain     She would like to continue with conservative management she is doing very well after procedure     Continue home exercise program     Follow-up as needed, patient request    Dr. Adorno November 2025      Bring original prescription medication bottles/container/box with labels to each visit

## 2024-11-26 ENCOUNTER — OFFICE VISIT (OUTPATIENT)
Dept: PAIN MEDICINE | Facility: CLINIC | Age: 64
End: 2024-11-26
Payer: MEDICARE

## 2024-11-26 VITALS
HEART RATE: 84 BPM | BODY MASS INDEX: 27.14 KG/M2 | SYSTOLIC BLOOD PRESSURE: 138 MMHG | HEIGHT: 64 IN | WEIGHT: 159 LBS | DIASTOLIC BLOOD PRESSURE: 84 MMHG | RESPIRATION RATE: 20 BRPM

## 2024-11-26 DIAGNOSIS — M54.16 LUMBAR RADICULOPATHY, CHRONIC: Primary | Chronic | ICD-10-CM

## 2024-11-26 DIAGNOSIS — M54.6 CHRONIC BILATERAL THORACIC BACK PAIN: Chronic | ICD-10-CM

## 2024-11-26 DIAGNOSIS — G89.29 CHRONIC BILATERAL THORACIC BACK PAIN: Chronic | ICD-10-CM

## 2024-11-26 PROCEDURE — 99999 PR PBB SHADOW E&M-EST. PATIENT-LVL IV: CPT | Mod: PBBFAC,,, | Performed by: PHYSICIAN ASSISTANT

## 2024-11-26 PROCEDURE — 3075F SYST BP GE 130 - 139MM HG: CPT | Mod: CPTII,,, | Performed by: PHYSICIAN ASSISTANT

## 2024-11-26 PROCEDURE — 3288F FALL RISK ASSESSMENT DOCD: CPT | Mod: CPTII,,, | Performed by: PHYSICIAN ASSISTANT

## 2024-11-26 PROCEDURE — 3079F DIAST BP 80-89 MM HG: CPT | Mod: CPTII,,, | Performed by: PHYSICIAN ASSISTANT

## 2024-11-26 PROCEDURE — 3008F BODY MASS INDEX DOCD: CPT | Mod: CPTII,,, | Performed by: PHYSICIAN ASSISTANT

## 2024-11-26 PROCEDURE — 1101F PT FALLS ASSESS-DOCD LE1/YR: CPT | Mod: CPTII,,, | Performed by: PHYSICIAN ASSISTANT

## 2024-11-26 PROCEDURE — 3044F HG A1C LEVEL LT 7.0%: CPT | Mod: CPTII,,, | Performed by: PHYSICIAN ASSISTANT

## 2024-11-26 PROCEDURE — 1126F AMNT PAIN NOTED NONE PRSNT: CPT | Mod: CPTII,,, | Performed by: PHYSICIAN ASSISTANT

## 2024-11-26 PROCEDURE — 99214 OFFICE O/P EST MOD 30 MIN: CPT | Mod: PBBFAC | Performed by: PHYSICIAN ASSISTANT

## 2024-11-26 PROCEDURE — 1159F MED LIST DOCD IN RCRD: CPT | Mod: CPTII,,, | Performed by: PHYSICIAN ASSISTANT

## 2024-11-26 PROCEDURE — 99213 OFFICE O/P EST LOW 20 MIN: CPT | Mod: S$PBB,,, | Performed by: PHYSICIAN ASSISTANT

## 2024-11-26 NOTE — PROGRESS NOTES
Subjective:         Patient ID: Nadia Salas is a 64 y.o. female.    Chief Complaint: Follow-up      Follow-up  This is a chronic problem. The current episode started more than 1 year ago. The problem occurs daily. The problem has been waxing and waning. Associated symptoms include arthralgias. Pertinent negatives include no anorexia, change in bowel habit, chest pain, chills, coughing, diaphoresis, fever, neck pain, rash, sore throat, swollen glands, urinary symptoms, vertigo or vomiting.     Review of Systems   Constitutional:  Negative for activity change, appetite change, chills, diaphoresis, fever and unexpected weight change.   HENT:  Negative for drooling, ear discharge, ear pain, facial swelling, nosebleeds, sore throat, trouble swallowing, voice change and goiter.    Eyes:  Negative for photophobia, pain, discharge, redness and visual disturbance.   Respiratory:  Negative for apnea, cough, choking, chest tightness, shortness of breath, wheezing and stridor.    Cardiovascular:  Negative for chest pain, palpitations and leg swelling.   Gastrointestinal:  Negative for abdominal distention, anorexia, change in bowel habit, diarrhea, rectal pain, vomiting and fecal incontinence.   Endocrine: Negative for cold intolerance, heat intolerance, polydipsia, polyphagia and polyuria.   Genitourinary:  Negative for bladder incontinence, dysuria, flank pain, frequency and hot flashes.   Musculoskeletal:  Positive for arthralgias, back pain and leg pain. Negative for neck pain.   Integumentary:  Negative for color change, pallor and rash.   Allergic/Immunologic: Negative for immunocompromised state.   Neurological:  Negative for dizziness, vertigo, seizures, syncope, facial asymmetry, speech difficulty, light-headedness, memory loss and coordination difficulties.   Hematological:  Negative for adenopathy. Does not bruise/bleed easily.   Psychiatric/Behavioral:  Negative for agitation, behavioral problems,  confusion, decreased concentration, dysphoric mood, hallucinations, self-injury and suicidal ideas. The patient is not nervous/anxious and is not hyperactive.            Past Medical History:   Diagnosis Date    Abnormal uterine bleeding (AUB) 04/28/2021    Elevated serum creatinine 07/27/2021    Rechecked at 2.1 following completion of treatment for Hep C.    Hepatitis C     Liver transplant recipient 04/06/2022    Last Assessment & Plan:   Formatting of this note might be different from the original.  OLT 4/5/22  DCD  CIT 8:49  WIT 34 min   Duct to duct  chronic HCV now s/p treatment in 2021 with SVR  With decompensated cirrhosis MELD 26  LFTs trending down       Past Surgical History:   Procedure Laterality Date    CHOLECYSTECTOMY      EPIDURAL STEROID INJECTION INTO LUMBAR SPINE Bilateral 11/12/2024    Procedure: L5-S1 COLLEEN;  Surgeon: Krista Adorno MD;  Location: [facility];  Service: Pain Management;  Laterality: Bilateral;    HERNIA REPAIR      HYSTERECTOMY      LIVER TRANSPLANT      ROBOT-ASSISTED LAPAROSCOPIC HYSTERECTOMY N/A 04/29/2021    Procedure: ROBOTIC HYSTERECTOMY;  Surgeon: Swapna Sow DO;  Location: [facility] OR;  Service: OB/GYN;  Laterality: N/A;    ROBOT-ASSISTED LAPAROSCOPIC SALPINGO-OOPHORECTOMY Bilateral 04/29/2021    Procedure: ROBOTIC SALPINGO-OOPHORECTOMY;  Surgeon: Swapna Sow DO;  Location: [facility] OR;  Service: OB/GYN;  Laterality: Bilateral;    TONSILLECTOMY       Social History     Socioeconomic History    Marital status: Single   Tobacco Use    Smoking status: Never    Smokeless tobacco: Never   Substance and Sexual Activity    Alcohol use: Not Currently    Drug use: Never    Sexual activity: Not Currently     Social Drivers of Health     Financial Resource Strain: Low Risk  (4/23/2024)    Overall Financial Resource Strain (CARDIA)     Difficulty of Paying Living Expenses: Not very hard   Food Insecurity: No Food Insecurity (4/23/2024)    Hunger Vital Sign  "    Worried About Running Out of Food in the Last Year: Never true     Ran Out of Food in the Last Year: Never true   Transportation Needs: No Transportation Needs (4/23/2024)    PRAPARE - Transportation     Lack of Transportation (Medical): No     Lack of Transportation (Non-Medical): No   Physical Activity: Insufficiently Active (4/23/2024)    Exercise Vital Sign     Days of Exercise per Week: 4 days     Minutes of Exercise per Session: 30 min   Stress: No Stress Concern Present (4/23/2024)    [employer] - Occupational Stress Questionnaire     Feeling of Stress : Not at all   Housing Stability: Unknown (4/23/2024)    Housing Stability Vital Sign     Unable to Pay for Housing in the Last Year: No     Family History   Problem Relation Name Age of Onset    Heart disease Father Father     No Known Problems Mother       Review of patient's allergies indicates:  No Known Allergies     Objective:  Vitals:    11/26/24 1424   BP: 138/84   Pulse: 84   Resp: 20   Weight: 72.1 kg (159 lb)   Height: 5' 4" (1.626 m)   PainSc: 0-No pain         Physical Exam  Vitals and nursing note reviewed. Exam conducted with a chaperone present.   Constitutional:       General: She is awake. She is not in acute distress.     Appearance: Normal appearance. She is not ill-appearing, toxic-appearing or diaphoretic.   HENT:      Head: Normocephalic and atraumatic.      Nose: Nose normal.      Mouth/Throat:      Mouth: Mucous membranes are moist.      Pharynx: Oropharynx is clear.   Eyes:      Conjunctiva/sclera: Conjunctivae normal.      Pupils: Pupils are equal, round, and reactive to light.   Cardiovascular:      Rate and Rhythm: Normal rate.   Pulmonary:      Effort: Pulmonary effort is normal. No respiratory distress.   Abdominal:      Palpations: Abdomen is soft.      Tenderness: There is no guarding.   Musculoskeletal:         General: Normal range of motion.      Cervical back: Normal range of motion and neck " supple. No rigidity.   Skin:     General: Skin is warm and dry.      Coloration: Skin is not jaundiced or pale.   Neurological:      General: No focal deficit present.      Mental Status: She is alert and oriented to person, place, and time. Mental status is at baseline.      Cranial Nerves: No cranial nerve deficit (II-XII).   Psychiatric:         Mood and Affect: Mood normal.         Behavior: Behavior normal. Behavior is cooperative.         Thought Content: Thought content normal.           FL Fluoro for Pain Management  See OP Notes for results.     IMPRESSION: See OP Notes for results.     This procedure was auto-finalized by: Virtual Radiologist       Lab Visit on 11/08/2024   Component Date Value Ref Range Status    Phosphorus 11/08/2024 3.5  2.5 - 4.5 mg/dL Final    Magnesium 11/08/2024 1.9  1.7 - 2.3 mg/dL Final    GGT 11/08/2024 11  5 - 55 U/L Final    Sodium 11/08/2024 130 (L)  136 - 145 mmol/L Final    Potassium 11/08/2024 4.9  3.5 - 5.1 mmol/L Final    Chloride 11/08/2024 99  98 - 107 mmol/L Final    CO2 11/08/2024 26  21 - 32 mmol/L Final    Anion Gap 11/08/2024 10  7 - 16 mmol/L Final    Glucose 11/08/2024 101  74 - 106 mg/dL Final    BUN 11/08/2024 16  7 - 18 mg/dL Final    Creatinine 11/08/2024 1.29 (H)  0.55 - 1.02 mg/dL Final    BUN/Creatinine Ratio 11/08/2024 12  6 - 20 Final    Calcium 11/08/2024 8.9  8.5 - 10.1 mg/dL Final    Total Protein 11/08/2024 7.4  6.4 - 8.2 g/dL Final    Albumin 11/08/2024 3.5  3.5 - 5.0 g/dL Final    Globulin 11/08/2024 3.9  2.0 - 4.0 g/dL Final    A/G Ratio 11/08/2024 0.9   Final    Bilirubin, Total 11/08/2024 0.5  >0.0 - 1.2 mg/dL Final    Alk Phos 11/08/2024 104  55 - 142 U/L Final    ALT 11/08/2024 13  13 - 56 U/L Final    AST 11/08/2024 14 (L)  15 - 37 U/L Final    eGFR 11/08/2024 46 (L)  >=60 mL/min/1.73m2 Final    Bilirubin, Direct 11/08/2024 <0.1  0.0 - 0.2 mg/dL Final    WBC 11/08/2024 2.85 (L)  4.50 - 11.00 K/uL Final    RBC 11/08/2024 4.35  4.20 - 5.40 M/uL  Final    Hemoglobin 11/08/2024 12.8  12.0 - 16.0 g/dL Final    Hematocrit 11/08/2024 39.0  38.0 - 47.0 % Final    MCV 11/08/2024 89.7  80.0 - 96.0 fL Final    MCH 11/08/2024 29.4  27.0 - 31.0 pg Final    MCHC 11/08/2024 32.8  32.0 - 36.0 g/dL Final    RDW 11/08/2024 13.0  11.5 - 14.5 % Final    Platelet Count 11/08/2024 117 (L)  150 - 400 K/uL Final    MPV 11/08/2024 10.5  9.4 - 12.4 fL Final    Neutrophils % 11/08/2024 59.5  53.0 - 65.0 % Final    Lymphocytes % 11/08/2024 30.9  27.0 - 41.0 % Final    Monocytes % 11/08/2024 6.0  2.0 - 6.0 % Final    Eosinophils % 11/08/2024 2.5  1.0 - 4.0 % Final    Basophils % 11/08/2024 0.7  0.0 - 1.0 % Final    Immature Granulocytes % 11/08/2024 0.4  0.0 - 0.4 % Final    nRBC, Auto 11/08/2024 0.0  <=0.0 % Final    Neutrophils, Abs 11/08/2024 1.70 (L)  1.80 - 7.70 K/uL Final    Lymphocytes, Absolute 11/08/2024 0.88 (L)  1.00 - 4.80 K/uL Final    Monocytes, Absolute 11/08/2024 0.17  0.00 - 0.80 K/uL Final    Eosinophils, Absolute 11/08/2024 0.07  0.00 - 0.50 K/uL Final    Basophils, Absolute 11/08/2024 0.02  0.00 - 0.20 K/uL Final    Immature Granulocytes, Absolute 11/08/2024 0.01  0.00 - 0.04 K/uL Final    nRBC, Absolute 11/08/2024 0.00  <=0.00 x10e3/uL Final    Diff Type 11/08/2024 Auto   Final    Tacrolimus 11/08/2024 5.0  5.0-15.0 (Trough) ng/mL Final   Office Visit on 10/16/2024   Component Date Value Ref Range Status    POC Amphetamines 10/16/2024 Negative  Negative, Inconclusive Final    POC Barbiturates 10/16/2024 Negative  Negative, Inconclusive Final    POC Benzodiazepines 10/16/2024 Negative  Negative, Inconclusive Final    POC Cocaine 10/16/2024 Negative  Negative, Inconclusive Final    POC THC 10/16/2024 Negative  Negative, Inconclusive Final    POC Methadone 10/16/2024 Negative  Negative, Inconclusive Final    POC Methamphetamine 10/16/2024 Negative  Negative, Inconclusive Final    POC Opiates 10/16/2024 Negative  Negative, Inconclusive Final    POC Oxycodone  10/16/2024 Negative  Negative, Inconclusive Final    POC Phencyclidine 10/16/2024 Negative  Negative, Inconclusive Final    POC Methylenedioxymethamphetamine * 10/16/2024 Negative  Negative, Inconclusive Final    POC Tricyclic Antidepressants 10/16/2024 Negative  Negative, Inconclusive Final    POC Buprenorphine 10/16/2024 Negative   Final     Acceptable 10/16/2024 Yes   Final    POC Temperature (Urine) 10/16/2024 94   Final    Creatinine, U 10/16/2024 15.3  mg/dL Final    Specific Gravity 10/16/2024 1.005   Final    pH 10/16/2024 6.0   Final    Oxidants 10/16/2024 Negative  Cutoff: 200 mg/L Final    Comment 10/16/2024 Normal   Final    Codeine 10/16/2024 Not Detected  Cutoff: 25 ng/mL Final    C6BG 10/16/2024 Not Detected  Cutoff: 100 ng/mL Final    Morphine 10/16/2024 Not Detected  Cutoff: 25 ng/mL Final    M6BG 10/16/2024 Not Detected  Cutoff: 100 ng/mL Final    6 Monoacetylmorphine 10/16/2024 Not Detected  Cutoff: 25 ng/mL Final    Hydrocodone 10/16/2024 Not Detected  Cutoff: 25 ng/mL Final    Norhydrocodone 10/16/2024 Not Detected  Cutoff: 25 ng/mL Final    Dihydrocodeine 10/16/2024 Not Detected  Cutoff: 25 ng/mL Final    Hydromorphone 10/16/2024 Not Detected  Cutoff: 25 ng/mL Final    Hydromorphone-3-beta-glucuronide 10/16/2024 Not Detected  Cutoff: 100 ng/mL Final    Oxycodone 10/16/2024 Not Detected  Cutoff: 25 ng/mL Final    Noroxycodone 10/16/2024 Not Detected  Cutoff: 25 ng/mL Final    Oxymorphone 10/16/2024 Not Detected  Cutoff: 25 ng/mL Final    Oxymorphone-3-beta-glucuronid 10/16/2024 Not Detected  Cutoff: 100 ng/mL Final    Noroxymorphone 10/16/2024 Not Detected  Cutoff: 25 ng/mL Final    Fentanyl 10/16/2024 Not Detected  Cutoff: 2 ng/mL Final    Norfentanyl 10/16/2024 Not Detected  Cutoff: 2 ng/mL Final    Meperidine 10/16/2024 Not Detected  Cutoff: 25 ng/mL Final    Normeperidine 10/16/2024 Not Detected  Cutoff: 25 ng/mL Final    Naloxone 10/16/2024 Not Detected  Cutoff: 25 ng/mL  Final    Naloxone-3-beta-glucuronide 10/16/2024 Not Detected  Cutoff: 100 ng/mL Final    Methadone 10/16/2024 Not Detected  Cutoff: 25 ng/mL Final    EDDP 10/16/2024 Not Detected  Cutoff: 25 ng/mL Final    Propoxyphene 10/16/2024 Not Detected  Cutoff: 25 ng/mL Final    Norpropoxyphene 10/16/2024 Not Detected  Cutoff: 25 ng/mL Final    Tramadol 10/16/2024 Not Detected  Cutoff: 25 ng/mL Final    O-desmethyltramadol 10/16/2024 Not Detected  Cutoff: 25 ng/mL Final    Tapentadol 10/16/2024 Not Detected  Cutoff: 25 ng/mL Final    N-Desmethyltapentadol 10/16/2024 Not Detected  Cutoff: 50 ng/mL Final    M TAPENTADOL-BETA-GLUCURONIDE 10/16/2024 Not Detected  Cutoff: 100 ng/mL Final    Buprenorphine 10/16/2024 Not Detected  Cutoff: 5 ng/mL Final    Norbuprenorphine 10/16/2024 Not Detected  Cutoff: 5 ng/mL Final    Norbuprenorphine glucuronide 10/16/2024 Not Detected  Cutoff: 20 ng/mL Final    Opioid Interpretation 10/16/2024 SEE COMMENTS   Final    Cocaine 10/16/2024 Negative  Cutoff: 150 ng/mL Final    Tetrahydrocannabinol 10/16/2024 Negative  Cutoff: 50 ng/mL Final    Alprazolam 10/16/2024 Not Detected  Cutoff: 10 ng/mL Final    Alpha-Hydroxyalprazolam 10/16/2024 Not Detected  Cutoff: 10 ng/mL Final    Alpha-Hydroxyalprazolam Glucuronide 10/16/2024 Not Detected  Cutoff: 50 ng/mL Final    Chlordiazepoxide 10/16/2024 Not Detected  Cutoff: 10 ng/mL Final    Clobazam 10/16/2024 Not Detected  Cutoff: 10 ng/mL Final    N-Desmethylclobazam 10/16/2024 Not Detected  Cutoff: 200 ng/mL Final    Clonazepam 10/16/2024 Not Detected  Cutoff: 10 ng/mL Final    7-aminoclonazepam 10/16/2024 Not Detected  Cutoff: 10 ng/mL Final    Diazepam 10/16/2024 Not Detected  Cutoff: 10 ng/mL Final    Nordiazepam 10/16/2024 Not Detected  Cutoff: 10 ng/mL Final    Flunitrazepam 10/16/2024 Not Detected  Cutoff: 10 ng/mL Final    7-aminoflunitrazepam 10/16/2024 Not Detected  Cutoff: 10 ng/mL Final    Flurazepam 10/16/2024 Not Detected  Cutoff: 10 ng/mL  Final    2-Hydroxy Ethyl Flurazepam 10/16/2024 Not Detected  Cutoff: 10 ng/mL Final    Lorazepam 10/16/2024 Not Detected  Cutoff: 10 ng/mL Final    Lorazepam Glucuronide 10/16/2024 Not Detected  Cutoff: 50 ng/mL Final    Midazolam 10/16/2024 Not Detected  Cutoff: 10 ng/mL Final    Alpha-Hydroxy Midazolam 10/16/2024 Not Detected  Cutoff: 10 ng/mL Final    Oxazepam 10/16/2024 Not Detected  Cutoff: 10 ng/mL Final    Oxazepam Glucuronide 10/16/2024 Not Detected  Cutoff: 50 ng/mL Final    Prazepam 10/16/2024 Not Detected  Cutoff: 10 ng/mL Final    Temazepam 10/16/2024 Not Detected  Cutoff: 10 ng/mL Final    Temazepam Glucuronide 10/16/2024 Not Detected  Cutoff: 50 ng/mL Final    Triazolam 10/16/2024 Not Detected  Cutoff: 10 ng/mL Final    Alpha-Hydroxy Triazolam 10/16/2024 Not Detected  Cutoff: 10 ng/mL Final    Zolpidem 10/16/2024 Not Detected  Cutoff: 10 ng/mL Final    Zolpidem Phenyl-4-Carboxylic acid 10/16/2024 Not Detected  Cutoff: 10 ng/mL Final    Benzodiazepine Interpretation 10/16/2024 SEE COMMENTS   Final    List Patient's Current Medications 10/16/2024 na   Final    Methamphetamine 10/16/2024 Not Detected  Cutoff: 100 ng/mL Final    Amphetamine 10/16/2024 Not Detected  Cutoff: 100 ng/mL Final    3,4-methylenedioxymethamphetamine * 10/16/2024 Not Detected  Cutoff: 100 ng/mL Final    3,0-zzbscjobeolqlz-B-ethylamphetam* 10/16/2024 Not Detected  Cutoff: 100 ng/mL Final    3,4-methylenedioxyamphetamine (MDA) 10/16/2024 Not Detected  Cutoff: 100 ng/mL Final    Ephedrine 10/16/2024 Not Detected  Cutoff: 100 ng/mL Final    Pseudoephedrine 10/16/2024 Not Detected  Cutoff: 100 ng/mL Final    Phentermine 10/16/2024 Not Detected  Cutoff: 100 ng/mL Final    Phencyclidine (PCP) 10/16/2024 Not Detected  Cutoff: 20 ng/mL Final    Methylphenidate 10/16/2024 Not Detected  Cutoff: 20 ng/mL Final    Ritalinic acid 10/16/2024 Not Detected  Cutoff: 100 ng/mL Final    Stimulant Interpretation 10/16/2024 SEE COMMENTS   Final     Barbiturates 10/16/2024 Negative  Cutoff: 200 ng/mL Final   Office Visit on 09/29/2024   Component Date Value Ref Range Status    POC Rapid COVID 09/29/2024 Positive (A)  Negative Final     Acceptable 09/29/2024 Yes   Final    POC Molecular Influenza A Ag 09/29/2024 Negative  Negative Final    POC Molecular Influenza B Ag 09/29/2024 Negative  Negative Final     Acceptable 09/29/2024 Yes   Final   Lab Visit on 09/13/2024   Component Date Value Ref Range Status    Hemoglobin A1C 09/13/2024 4.8  4.5 - 6.6 % Final    Estimated Average Glucose 09/13/2024 91  mg/dL Final    Phosphorus 09/13/2024 3.8  2.5 - 4.5 mg/dL Final    Magnesium 09/13/2024 1.8  1.7 - 2.3 mg/dL Final    GGT 09/13/2024 15  5 - 55 U/L Final    Sodium 09/13/2024 129 (L)  136 - 145 mmol/L Final    Potassium 09/13/2024 4.9  3.5 - 5.1 mmol/L Final    Chloride 09/13/2024 98  98 - 107 mmol/L Final    CO2 09/13/2024 28  21 - 32 mmol/L Final    Anion Gap 09/13/2024 8  7 - 16 mmol/L Final    Glucose 09/13/2024 113 (H)  74 - 106 mg/dL Final    BUN 09/13/2024 18  7 - 18 mg/dL Final    Creatinine 09/13/2024 1.31 (H)  0.55 - 1.02 mg/dL Final    BUN/Creatinine Ratio 09/13/2024 14  6 - 20 Final    Calcium 09/13/2024 8.8  8.5 - 10.1 mg/dL Final    Total Protein 09/13/2024 7.3  6.4 - 8.2 g/dL Final    Albumin 09/13/2024 3.7  3.5 - 5.0 g/dL Final    Globulin 09/13/2024 3.6  2.0 - 4.0 g/dL Final    A/G Ratio 09/13/2024 1.0   Final    Bilirubin, Total 09/13/2024 0.4  >0.0 - 1.2 mg/dL Final    Alk Phos 09/13/2024 112  55 - 142 U/L Final    ALT 09/13/2024 17  13 - 56 U/L Final    AST 09/13/2024 16  15 - 37 U/L Final    eGFR 09/13/2024 45 (L)  >=60 mL/min/1.73m2 Final    Bilirubin, Direct 09/13/2024 0.1  0.0 - 0.2 mg/dL Final    WBC 09/13/2024 3.04 (L)  4.50 - 11.00 K/uL Final    RBC 09/13/2024 3.95 (L)  4.20 - 5.40 M/uL Final    Hemoglobin 09/13/2024 11.5 (L)  12.0 - 16.0 g/dL Final    Hematocrit 09/13/2024 36.2 (L)  38.0 - 47.0 % Final    MCV  09/13/2024 91.6  80.0 - 96.0 fL Final    MCH 09/13/2024 29.1  27.0 - 31.0 pg Final    MCHC 09/13/2024 31.8 (L)  32.0 - 36.0 g/dL Final    RDW 09/13/2024 13.0  11.5 - 14.5 % Final    Platelet Count 09/13/2024 124 (L)  150 - 400 K/uL Final    MPV 09/13/2024 9.9  9.4 - 12.4 fL Final    Neutrophils % 09/13/2024 62.2  53.0 - 65.0 % Final    Lymphocytes % 09/13/2024 27.0  27.0 - 41.0 % Final    Monocytes % 09/13/2024 7.2 (H)  2.0 - 6.0 % Final    Eosinophils % 09/13/2024 2.3  1.0 - 4.0 % Final    Basophils % 09/13/2024 1.0  0.0 - 1.0 % Final    Immature Granulocytes % 09/13/2024 0.3  0.0 - 0.4 % Final    nRBC, Auto 09/13/2024 0.0  <=0.0 % Final    Neutrophils, Abs 09/13/2024 1.89  1.80 - 7.70 K/uL Final    Lymphocytes, Absolute 09/13/2024 0.82 (L)  1.00 - 4.80 K/uL Final    Monocytes, Absolute 09/13/2024 0.22  0.00 - 0.80 K/uL Final    Eosinophils, Absolute 09/13/2024 0.07  0.00 - 0.50 K/uL Final    Basophils, Absolute 09/13/2024 0.03  0.00 - 0.20 K/uL Final    Immature Granulocytes, Absolute 09/13/2024 0.01  0.00 - 0.04 K/uL Final    nRBC, Absolute 09/13/2024 0.00  <=0.00 x10e3/uL Final    Diff Type 09/13/2024 Auto   Final    Tacrolimus 09/13/2024 2.7 (L)  5.0-15.0 (Trough) ng/mL Final   Office Visit on 08/27/2024   Component Date Value Ref Range Status    Final Diagnosis 08/27/2024    Final                    Value:This result contains rich text formatting which cannot be displayed here.    Comments 08/27/2024    Final                    Value:This result contains rich text formatting which cannot be displayed here.    Specimen A Procedure 08/27/2024 Shave Biopsy   Final    Specimen A Morphology 08/27/2024 irregular brown macule   Final    Specimen A DDx 08/27/2024 nevus r/o atypia   Final    Specimen B Procedure 08/27/2024 Shave Biopsy   Final    Specimen B Morphology 08/27/2024 irregular brown macule   Final    Specimen B DDx 08/27/2024 nevus r/o atypia   Final    Microscopic Description 08/27/2024    Final                     Value:This result contains rich text formatting which cannot be displayed here.    Gross Description 08/27/2024    Final                    Value:This result contains rich text formatting which cannot be displayed here.    Laboratory Notes 08/27/2024    Final                    Value:This result contains rich text formatting which cannot be displayed here.    Case Report 08/27/2024    Final                    Value:Surgical Pathology                                Case: [id number]                                   Authorizing Provider:  Unique Witt MD       Collected:           08/27/2024 05:06 PM          Ordering Location:     [facility] Received:            08/28/2024 08:15 AM          Pathologist:           Hima Vallejo MD                                                      Specimens:   A) - Face, L cheek                                                                                  B) - Other, please specify, mid back                                                      Lab Visit on 08/14/2024   Component Date Value Ref Range Status    Vitamin D 25-Hydroxy, Blood 08/14/2024 40.2  ng/mL Final   Lab Visit on 07/19/2024   Component Date Value Ref Range Status    Phosphorus 07/19/2024 3.5  2.5 - 4.5 mg/dL Final    Magnesium 07/19/2024 1.9  1.7 - 2.3 mg/dL Final    GGT 07/19/2024 16  5 - 55 U/L Final    Sodium 07/19/2024 130 (L)  136 - 145 mmol/L Final    Potassium 07/19/2024 4.5  3.5 - 5.1 mmol/L Final    Chloride 07/19/2024 99  98 - 107 mmol/L Final    CO2 07/19/2024 27  21 - 32 mmol/L Final    Anion Gap 07/19/2024 9  7 - 16 mmol/L Final    Glucose 07/19/2024 112 (H)  74 - 106 mg/dL Final    BUN 07/19/2024 17  7 - 18 mg/dL Final    Creatinine 07/19/2024 1.35 (H)  0.55 - 1.02 mg/dL Final    BUN/Creatinine Ratio 07/19/2024 13  6 - 20 Final    Calcium 07/19/2024 8.7  8.5 - 10.1 mg/dL Final    Total Protein 07/19/2024 7.5  6.4 - 8.2 g/dL Final    Albumin 07/19/2024 3.9  3.5 -  5.0 g/dL Final    Globulin 07/19/2024 3.6  2.0 - 4.0 g/dL Final    A/G Ratio 07/19/2024 1.1   Final    Bilirubin, Total 07/19/2024 0.4  >0.0 - 1.2 mg/dL Final    Alk Phos 07/19/2024 123  55 - 142 U/L Final    ALT 07/19/2024 17  13 - 56 U/L Final    AST 07/19/2024 16  15 - 37 U/L Final    eGFR 07/19/2024 43 (L)  >=60 mL/min/1.73m2 Final    Bilirubin, Direct 07/19/2024 <0.1  0.0 - 0.2 mg/dL Final    WBC 07/19/2024 2.94 (L)  4.50 - 11.00 K/uL Final    RBC 07/19/2024 4.25  4.20 - 5.40 M/uL Final    Hemoglobin 07/19/2024 12.6  12.0 - 16.0 g/dL Final    Hematocrit 07/19/2024 38.2  38.0 - 47.0 % Final    MCV 07/19/2024 89.9  80.0 - 96.0 fL Final    MCH 07/19/2024 29.6  27.0 - 31.0 pg Final    MCHC 07/19/2024 33.0  32.0 - 36.0 g/dL Final    RDW 07/19/2024 13.0  11.5 - 14.5 % Final    Platelet Count 07/19/2024 114 (L)  150 - 400 K/uL Final    MPV 07/19/2024 10.4  9.4 - 12.4 fL Final    Neutrophils % 07/19/2024 63.3  53.0 - 65.0 % Final    Lymphocytes % 07/19/2024 26.2 (L)  27.0 - 41.0 % Final    Monocytes % 07/19/2024 6.5 (H)  2.0 - 6.0 % Final    Eosinophils % 07/19/2024 2.7  1.0 - 4.0 % Final    Basophils % 07/19/2024 1.0  0.0 - 1.0 % Final    Immature Granulocytes % 07/19/2024 0.3  0.0 - 0.4 % Final    nRBC, Auto 07/19/2024 0.0  <=0.0 % Final    Neutrophils, Abs 07/19/2024 1.86  1.80 - 7.70 K/uL Final    Lymphocytes, Absolute 07/19/2024 0.77 (L)  1.00 - 4.80 K/uL Final    Monocytes, Absolute 07/19/2024 0.19  0.00 - 0.80 K/uL Final    Eosinophils, Absolute 07/19/2024 0.08  0.00 - 0.50 K/uL Final    Basophils, Absolute 07/19/2024 0.03  0.00 - 0.20 K/uL Final    Immature Granulocytes, Absolute 07/19/2024 0.01  0.00 - 0.04 K/uL Final    nRBC, Absolute 07/19/2024 0.00  <=0.00 x10e3/uL Final    Diff Type 07/19/2024 Auto   Final    Tacrolimus 07/19/2024 2.7 (L)  5.0-15.0 (Trough) ng/mL Final         No orders of the defined types were placed in this encounter.      Requested Prescriptions      No prescriptions requested or  ordered in this encounter       Assessment:     1. Lumbar radiculopathy, chronic    2. Chronic bilateral thoracic back pain             Plan:    Not using narcotics from our office November 26, 2024     Follows spine surgery [facility]     Follow-up after lumbar L5/S1 COLLEEN # 1 November 12, 2024  Patient states she had 90% relief after procedure  Procedure did help improve her level function     Procedure note/fluoro images reviewed     MRI of the lumbar from [facility] September 06/20/2024 revealed L3-4 left foraminal stenosis, L4-5 bilateral stenosis and L5-S1 central disc protrusion and severe left foraminal stenosis      She states she has grateful for procedure did help improve her pain      She would like to continue with conservative management she is doing very well after procedure      Continue home exercise program      Follow-up as needed, patient request     Dr. Adorno November 2025         Bring original prescription medication bottles/container/box with labels to each visit

## 2024-11-26 NOTE — PROGRESS NOTES
Subjective:         Patient ID: Nadia Salas is a 64 y.o. female.    Chief Complaint: Follow-up      Follow-up  This is a chronic problem. The current episode started more than 1 year ago. The problem occurs daily. The problem has been waxing and waning. Associated symptoms include arthralgias. Pertinent negatives include no anorexia, change in bowel habit, chest pain, chills, coughing, diaphoresis, fever, neck pain, rash, sore throat, swollen glands, urinary symptoms, vertigo or vomiting.     Review of Systems   Constitutional:  Negative for activity change, appetite change, chills, diaphoresis, fever and unexpected weight change.   HENT:  Negative for drooling, ear discharge, ear pain, facial swelling, nosebleeds, sore throat, trouble swallowing, voice change and goiter.    Eyes:  Negative for photophobia, pain, discharge, redness and visual disturbance.   Respiratory:  Negative for apnea, cough, choking, chest tightness, shortness of breath, wheezing and stridor.    Cardiovascular:  Negative for chest pain, palpitations and leg swelling.   Gastrointestinal:  Negative for abdominal distention, anorexia, change in bowel habit, diarrhea, rectal pain, vomiting and fecal incontinence.   Endocrine: Negative for cold intolerance, heat intolerance, polydipsia, polyphagia and polyuria.   Genitourinary:  Negative for bladder incontinence, dysuria, flank pain, frequency and hot flashes.   Musculoskeletal:  Positive for arthralgias, back pain and leg pain. Negative for neck pain.   Integumentary:  Negative for color change, pallor and rash.   Allergic/Immunologic: Negative for immunocompromised state.   Neurological:  Negative for dizziness, vertigo, seizures, syncope, facial asymmetry, speech difficulty, light-headedness, memory loss and coordination difficulties.   Hematological:  Negative for adenopathy. Does not bruise/bleed easily.   Psychiatric/Behavioral:  Negative for agitation, behavioral problems,  confusion, decreased concentration, dysphoric mood, hallucinations, self-injury and suicidal ideas. The patient is not nervous/anxious and is not hyperactive.            Past Medical History:   Diagnosis Date    Abnormal uterine bleeding (AUB) 04/28/2021    Elevated serum creatinine 07/27/2021    Rechecked at 2.1 following completion of treatment for Hep C.    Hepatitis C     Liver transplant recipient 04/06/2022    Last Assessment & Plan:   Formatting of this note might be different from the original.  OLT 4/5/22  DCD  CIT 8:49  WIT 34 min   Duct to duct  chronic HCV now s/p treatment in 2021 with SVR  With decompensated cirrhosis MELD 26  LFTs trending down       Past Surgical History:   Procedure Laterality Date    CHOLECYSTECTOMY      EPIDURAL STEROID INJECTION INTO LUMBAR SPINE Bilateral 11/12/2024    Procedure: L5-S1 COLLEEN;  Surgeon: Krista Adorno MD;  Location: [facility];  Service: Pain Management;  Laterality: Bilateral;    HERNIA REPAIR      HYSTERECTOMY      LIVER TRANSPLANT      ROBOT-ASSISTED LAPAROSCOPIC HYSTERECTOMY N/A 04/29/2021    Procedure: ROBOTIC HYSTERECTOMY;  Surgeon: Swapna Sow DO;  Location: [facility] OR;  Service: OB/GYN;  Laterality: N/A;    ROBOT-ASSISTED LAPAROSCOPIC SALPINGO-OOPHORECTOMY Bilateral 04/29/2021    Procedure: ROBOTIC SALPINGO-OOPHORECTOMY;  Surgeon: Swapna Sow DO;  Location: [facility] OR;  Service: OB/GYN;  Laterality: Bilateral;    TONSILLECTOMY       Social History     Socioeconomic History    Marital status: Single   Tobacco Use    Smoking status: Never    Smokeless tobacco: Never   Substance and Sexual Activity    Alcohol use: Not Currently    Drug use: Never    Sexual activity: Not Currently     Social Drivers of Health     Financial Resource Strain: Low Risk  (4/23/2024)    Overall Financial Resource Strain (CARDIA)     Difficulty of Paying Living Expenses: Not very hard   Food Insecurity: No Food Insecurity (4/23/2024)    Hunger Vital Sign  "    Worried About Running Out of Food in the Last Year: Never true     Ran Out of Food in the Last Year: Never true   Transportation Needs: No Transportation Needs (4/23/2024)    PRAPARE - Transportation     Lack of Transportation (Medical): No     Lack of Transportation (Non-Medical): No   Physical Activity: Insufficiently Active (4/23/2024)    Exercise Vital Sign     Days of Exercise per Week: 4 days     Minutes of Exercise per Session: 30 min   Stress: No Stress Concern Present (4/23/2024)    [employer] [place] [employer] - Occupational Stress Questionnaire     Feeling of Stress : Not at all   Housing Stability: Unknown (4/23/2024)    Housing Stability Vital Sign     Unable to Pay for Housing in the Last Year: No     Family History   Problem Relation Name Age of Onset    Heart disease Father Father     No Known Problems Mother       Review of patient's allergies indicates:  No Known Allergies     Objective:  Vitals:    11/26/24 1424   BP: 138/84   Pulse: 84   Resp: 20   Weight: 72.1 kg (159 lb)   Height: 5' 4" (1.626 m)   PainSc: 0-No pain         Physical Exam  Vitals and nursing note reviewed. Exam conducted with a chaperone present.   Constitutional:       General: She is awake. She is not in acute distress.     Appearance: Normal appearance. She is not ill-appearing, toxic-appearing or diaphoretic.   HENT:      Head: Normocephalic and atraumatic.      Nose: Nose normal.      Mouth/Throat:      Mouth: Mucous membranes are moist.      Pharynx: Oropharynx is clear.   Eyes:      Conjunctiva/sclera: Conjunctivae normal.      Pupils: Pupils are equal, round, and reactive to light.   Cardiovascular:      Rate and Rhythm: Normal rate.   Pulmonary:      Effort: Pulmonary effort is normal. No respiratory distress.   Abdominal:      Palpations: Abdomen is soft.      Tenderness: There is no guarding.   Musculoskeletal:         General: Normal range of motion.      Cervical back: Normal range of motion and neck " supple. No rigidity.   Skin:     General: Skin is warm and dry.      Coloration: Skin is not jaundiced or pale.   Neurological:      General: No focal deficit present.      Mental Status: She is alert and oriented to person, place, and time. Mental status is at baseline.      Cranial Nerves: No cranial nerve deficit (II-XII).   Psychiatric:         Mood and Affect: Mood normal.         Behavior: Behavior normal. Behavior is cooperative.         Thought Content: Thought content normal.           [place] Fluoro for Pain Management  See OP Notes for results.     IMPRESSION: See OP Notes for results.     This procedure was auto-finalized by: Virtual Radiologist       Lab Visit on 11/08/2024   Component Date Value Ref Range Status    Phosphorus 11/08/2024 3.5  2.5 - 4.5 mg/dL Final    Magnesium 11/08/2024 1.9  1.7 - 2.3 mg/dL Final    GGT 11/08/2024 11  5 - 55 U/L Final    Sodium 11/08/2024 130 (L)  136 - 145 mmol/L Final    Potassium 11/08/2024 4.9  3.5 - 5.1 mmol/L Final    Chloride 11/08/2024 99  98 - 107 mmol/L Final    CO2 11/08/2024 26  21 - 32 mmol/L Final    Anion Gap 11/08/2024 10  7 - 16 mmol/L Final    Glucose 11/08/2024 101  74 - 106 mg/dL Final    BUN 11/08/2024 16  7 - 18 mg/dL Final    Creatinine 11/08/2024 1.29 (H)  0.55 - 1.02 mg/dL Final    BUN/Creatinine Ratio 11/08/2024 12  6 - 20 Final    Calcium 11/08/2024 8.9  8.5 - 10.1 mg/dL Final    Total Protein 11/08/2024 7.4  6.4 - 8.2 g/dL Final    Albumin 11/08/2024 3.5  3.5 - 5.0 g/dL Final    Globulin 11/08/2024 3.9  2.0 - 4.0 g/dL Final    A/G Ratio 11/08/2024 0.9   Final    Bilirubin, Total 11/08/2024 0.5  >0.0 - 1.2 mg/dL Final    Alk Phos 11/08/2024 104  55 - 142 U/L Final    ALT 11/08/2024 13  13 - 56 U/L Final    AST 11/08/2024 14 (L)  15 - 37 U/L Final    eGFR 11/08/2024 46 (L)  >=60 mL/min/1.73m2 Final    Bilirubin, Direct 11/08/2024 <0.1  0.0 - 0.2 mg/dL Final    WBC 11/08/2024 2.85 (L)  4.50 - 11.00 K/uL Final    RBC 11/08/2024 4.35  4.20 - 5.40 M/uL  Final    Hemoglobin 11/08/2024 12.8  12.0 - 16.0 g/dL Final    Hematocrit 11/08/2024 39.0  38.0 - 47.0 % Final    MCV 11/08/2024 89.7  80.0 - 96.0 fL Final    MCH 11/08/2024 29.4  27.0 - 31.0 pg Final    MCHC 11/08/2024 32.8  32.0 - 36.0 g/dL Final    RDW 11/08/2024 13.0  11.5 - 14.5 % Final    Platelet Count 11/08/2024 117 (L)  150 - 400 K/uL Final    MPV 11/08/2024 10.5  9.4 - 12.4 fL Final    Neutrophils % 11/08/2024 59.5  53.0 - 65.0 % Final    Lymphocytes % 11/08/2024 30.9  27.0 - 41.0 % Final    Monocytes % 11/08/2024 6.0  2.0 - 6.0 % Final    Eosinophils % 11/08/2024 2.5  1.0 - 4.0 % Final    Basophils % 11/08/2024 0.7  0.0 - 1.0 % Final    Immature Granulocytes % 11/08/2024 0.4  0.0 - 0.4 % Final    nRBC, Auto 11/08/2024 0.0  <=0.0 % Final    Neutrophils, Abs 11/08/2024 1.70 (L)  1.80 - 7.70 K/uL Final    Lymphocytes, Absolute 11/08/2024 0.88 (L)  1.00 - 4.80 K/uL Final    Monocytes, Absolute 11/08/2024 0.17  0.00 - 0.80 K/uL Final    Eosinophils, Absolute 11/08/2024 0.07  0.00 - 0.50 K/uL Final    Basophils, Absolute 11/08/2024 0.02  0.00 - 0.20 K/uL Final    Immature Granulocytes, Absolute 11/08/2024 0.01  0.00 - 0.04 K/uL Final    nRBC, Absolute 11/08/2024 0.00  <=0.00 x10e3/uL Final    Diff Type 11/08/2024 Auto   Final    Tacrolimus 11/08/2024 5.0  5.0-15.0 (Trough) ng/mL Final   Office Visit on 10/16/2024   Component Date Value Ref Range Status    POC Amphetamines 10/16/2024 Negative  Negative, Inconclusive Final    POC Barbiturates 10/16/2024 Negative  Negative, Inconclusive Final    POC Benzodiazepines 10/16/2024 Negative  Negative, Inconclusive Final    POC Cocaine 10/16/2024 Negative  Negative, Inconclusive Final    POC THC 10/16/2024 Negative  Negative, Inconclusive Final    POC Methadone 10/16/2024 Negative  Negative, Inconclusive Final    POC Methamphetamine 10/16/2024 Negative  Negative, Inconclusive Final    POC Opiates 10/16/2024 Negative  Negative, Inconclusive Final    POC Oxycodone  10/16/2024 Negative  Negative, Inconclusive Final    POC Phencyclidine 10/16/2024 Negative  Negative, Inconclusive Final    POC Methylenedioxymethamphetamine * 10/16/2024 Negative  Negative, Inconclusive Final    POC Tricyclic Antidepressants 10/16/2024 Negative  Negative, Inconclusive Final    POC Buprenorphine 10/16/2024 Negative   Final     Acceptable 10/16/2024 Yes   Final    POC Temperature (Urine) 10/16/2024 94   Final    Creatinine, U 10/16/2024 15.3  mg/dL Final    Specific Gravity 10/16/2024 1.005   Final    pH 10/16/2024 6.0   Final    Oxidants 10/16/2024 Negative  Cutoff: 200 mg/L Final    Comment 10/16/2024 Normal   Final    Codeine 10/16/2024 Not Detected  Cutoff: 25 ng/mL Final    C6BG 10/16/2024 Not Detected  Cutoff: 100 ng/mL Final    Morphine 10/16/2024 Not Detected  Cutoff: 25 ng/mL Final    M6BG 10/16/2024 Not Detected  Cutoff: 100 ng/mL Final    6 Monoacetylmorphine 10/16/2024 Not Detected  Cutoff: 25 ng/mL Final    Hydrocodone 10/16/2024 Not Detected  Cutoff: 25 ng/mL Final    Norhydrocodone 10/16/2024 Not Detected  Cutoff: 25 ng/mL Final    Dihydrocodeine 10/16/2024 Not Detected  Cutoff: 25 ng/mL Final    Hydromorphone 10/16/2024 Not Detected  Cutoff: 25 ng/mL Final    Hydromorphone-3-beta-glucuronide 10/16/2024 Not Detected  Cutoff: 100 ng/mL Final    Oxycodone 10/16/2024 Not Detected  Cutoff: 25 ng/mL Final    Noroxycodone 10/16/2024 Not Detected  Cutoff: 25 ng/mL Final    Oxymorphone 10/16/2024 Not Detected  Cutoff: 25 ng/mL Final    Oxymorphone-3-beta-glucuronid 10/16/2024 Not Detected  Cutoff: 100 ng/mL Final    Noroxymorphone 10/16/2024 Not Detected  Cutoff: 25 ng/mL Final    Fentanyl 10/16/2024 Not Detected  Cutoff: 2 ng/mL Final    Norfentanyl 10/16/2024 Not Detected  Cutoff: 2 ng/mL Final    Meperidine 10/16/2024 Not Detected  Cutoff: 25 ng/mL Final    Normeperidine 10/16/2024 Not Detected  Cutoff: 25 ng/mL Final    Naloxone 10/16/2024 Not Detected  Cutoff: 25 ng/mL  Final    Naloxone-3-beta-glucuronide 10/16/2024 Not Detected  Cutoff: 100 ng/mL Final    Methadone 10/16/2024 Not Detected  Cutoff: 25 ng/mL Final    EDDP 10/16/2024 Not Detected  Cutoff: 25 ng/mL Final    Propoxyphene 10/16/2024 Not Detected  Cutoff: 25 ng/mL Final    Norpropoxyphene 10/16/2024 Not Detected  Cutoff: 25 ng/mL Final    Tramadol 10/16/2024 Not Detected  Cutoff: 25 ng/mL Final    O-desmethyltramadol 10/16/2024 Not Detected  Cutoff: 25 ng/mL Final    Tapentadol 10/16/2024 Not Detected  Cutoff: 25 ng/mL Final    N-Desmethyltapentadol 10/16/2024 Not Detected  Cutoff: 50 ng/mL Final    M TAPENTADOL-BETA-GLUCURONIDE 10/16/2024 Not Detected  Cutoff: 100 ng/mL Final    Buprenorphine 10/16/2024 Not Detected  Cutoff: 5 ng/mL Final    Norbuprenorphine 10/16/2024 Not Detected  Cutoff: 5 ng/mL Final    Norbuprenorphine glucuronide 10/16/2024 Not Detected  Cutoff: 20 ng/mL Final    Opioid Interpretation 10/16/2024 SEE COMMENTS   Final    Cocaine 10/16/2024 Negative  Cutoff: 150 ng/mL Final    Tetrahydrocannabinol 10/16/2024 Negative  Cutoff: 50 ng/mL Final    Alprazolam 10/16/2024 Not Detected  Cutoff: 10 ng/mL Final    Alpha-Hydroxyalprazolam 10/16/2024 Not Detected  Cutoff: 10 ng/mL Final    Alpha-Hydroxyalprazolam Glucuronide 10/16/2024 Not Detected  Cutoff: 50 ng/mL Final    Chlordiazepoxide 10/16/2024 Not Detected  Cutoff: 10 ng/mL Final    Clobazam 10/16/2024 Not Detected  Cutoff: 10 ng/mL Final    N-Desmethylclobazam 10/16/2024 Not Detected  Cutoff: 200 ng/mL Final    Clonazepam 10/16/2024 Not Detected  Cutoff: 10 ng/mL Final    7-aminoclonazepam 10/16/2024 Not Detected  Cutoff: 10 ng/mL Final    Diazepam 10/16/2024 Not Detected  Cutoff: 10 ng/mL Final    Nordiazepam 10/16/2024 Not Detected  Cutoff: 10 ng/mL Final    Flunitrazepam 10/16/2024 Not Detected  Cutoff: 10 ng/mL Final    7-aminoflunitrazepam 10/16/2024 Not Detected  Cutoff: 10 ng/mL Final    Flurazepam 10/16/2024 Not Detected  Cutoff: 10 ng/mL  Final    2-Hydroxy Ethyl Flurazepam 10/16/2024 Not Detected  Cutoff: 10 ng/mL Final    Lorazepam 10/16/2024 Not Detected  Cutoff: 10 ng/mL Final    Lorazepam Glucuronide 10/16/2024 Not Detected  Cutoff: 50 ng/mL Final    Midazolam 10/16/2024 Not Detected  Cutoff: 10 ng/mL Final    Alpha-Hydroxy Midazolam 10/16/2024 Not Detected  Cutoff: 10 ng/mL Final    Oxazepam 10/16/2024 Not Detected  Cutoff: 10 ng/mL Final    Oxazepam Glucuronide 10/16/2024 Not Detected  Cutoff: 50 ng/mL Final    Prazepam 10/16/2024 Not Detected  Cutoff: 10 ng/mL Final    Temazepam 10/16/2024 Not Detected  Cutoff: 10 ng/mL Final    Temazepam Glucuronide 10/16/2024 Not Detected  Cutoff: 50 ng/mL Final    Triazolam 10/16/2024 Not Detected  Cutoff: 10 ng/mL Final    Alpha-Hydroxy Triazolam 10/16/2024 Not Detected  Cutoff: 10 ng/mL Final    Zolpidem 10/16/2024 Not Detected  Cutoff: 10 ng/mL Final    Zolpidem Phenyl-4-Carboxylic acid 10/16/2024 Not Detected  Cutoff: 10 ng/mL Final    Benzodiazepine Interpretation 10/16/2024 SEE COMMENTS   Final    List Patient's Current Medications 10/16/2024 na   Final    Methamphetamine 10/16/2024 Not Detected  Cutoff: 100 ng/mL Final    Amphetamine 10/16/2024 Not Detected  Cutoff: 100 ng/mL Final    3,4-methylenedioxymethamphetamine * 10/16/2024 Not Detected  Cutoff: 100 ng/mL Final    3,9-xsgzzvwjonrfyu-A-ethylamphetam* 10/16/2024 Not Detected  Cutoff: 100 ng/mL Final    3,4-methylenedioxyamphetamine (MDA) 10/16/2024 Not Detected  Cutoff: 100 ng/mL Final    Ephedrine 10/16/2024 Not Detected  Cutoff: 100 ng/mL Final    Pseudoephedrine 10/16/2024 Not Detected  Cutoff: 100 ng/mL Final    Phentermine 10/16/2024 Not Detected  Cutoff: 100 ng/mL Final    Phencyclidine (PCP) 10/16/2024 Not Detected  Cutoff: 20 ng/mL Final    Methylphenidate 10/16/2024 Not Detected  Cutoff: 20 ng/mL Final    Ritalinic acid 10/16/2024 Not Detected  Cutoff: 100 ng/mL Final    Stimulant Interpretation 10/16/2024 SEE COMMENTS   Final     Barbiturates 10/16/2024 Negative  Cutoff: 200 ng/mL Final   Office Visit on 09/29/2024   Component Date Value Ref Range Status    POC Rapid COVID 09/29/2024 Positive (A)  Negative Final     Acceptable 09/29/2024 Yes   Final    POC Molecular Influenza A Ag 09/29/2024 Negative  Negative Final    POC Molecular Influenza B Ag 09/29/2024 Negative  Negative Final     Acceptable 09/29/2024 Yes   Final   Lab Visit on 09/13/2024   Component Date Value Ref Range Status    Hemoglobin A1C 09/13/2024 4.8  4.5 - 6.6 % Final    Estimated Average Glucose 09/13/2024 91  mg/dL Final    Phosphorus 09/13/2024 3.8  2.5 - 4.5 mg/dL Final    Magnesium 09/13/2024 1.8  1.7 - 2.3 mg/dL Final    GGT 09/13/2024 15  5 - 55 U/L Final    Sodium 09/13/2024 129 (L)  136 - 145 mmol/L Final    Potassium 09/13/2024 4.9  3.5 - 5.1 mmol/L Final    Chloride 09/13/2024 98  98 - 107 mmol/L Final    CO2 09/13/2024 28  21 - 32 mmol/L Final    Anion Gap 09/13/2024 8  7 - 16 mmol/L Final    Glucose 09/13/2024 113 (H)  74 - 106 mg/dL Final    BUN 09/13/2024 18  7 - 18 mg/dL Final    Creatinine 09/13/2024 1.31 (H)  0.55 - 1.02 mg/dL Final    BUN/Creatinine Ratio 09/13/2024 14  6 - 20 Final    Calcium 09/13/2024 8.8  8.5 - 10.1 mg/dL Final    Total Protein 09/13/2024 7.3  6.4 - 8.2 g/dL Final    Albumin 09/13/2024 3.7  3.5 - 5.0 g/dL Final    Globulin 09/13/2024 3.6  2.0 - 4.0 g/dL Final    A/G Ratio 09/13/2024 1.0   Final    Bilirubin, Total 09/13/2024 0.4  >0.0 - 1.2 mg/dL Final    Alk Phos 09/13/2024 112  55 - 142 U/L Final    ALT 09/13/2024 17  13 - 56 U/L Final    AST 09/13/2024 16  15 - 37 U/L Final    eGFR 09/13/2024 45 (L)  >=60 mL/min/1.73m2 Final    Bilirubin, Direct 09/13/2024 0.1  0.0 - 0.2 mg/dL Final    WBC 09/13/2024 3.04 (L)  4.50 - 11.00 K/uL Final    RBC 09/13/2024 3.95 (L)  4.20 - 5.40 M/uL Final    Hemoglobin 09/13/2024 11.5 (L)  12.0 - 16.0 g/dL Final    Hematocrit 09/13/2024 36.2 (L)  38.0 - 47.0 % Final    MCV  09/13/2024 91.6  80.0 - 96.0 fL Final    MCH 09/13/2024 29.1  27.0 - 31.0 pg Final    MCHC 09/13/2024 31.8 (L)  32.0 - 36.0 g/dL Final    RDW 09/13/2024 13.0  11.5 - 14.5 % Final    Platelet Count 09/13/2024 124 (L)  150 - 400 K/uL Final    MPV 09/13/2024 9.9  9.4 - 12.4 fL Final    Neutrophils % 09/13/2024 62.2  53.0 - 65.0 % Final    Lymphocytes % 09/13/2024 27.0  27.0 - 41.0 % Final    Monocytes % 09/13/2024 7.2 (H)  2.0 - 6.0 % Final    Eosinophils % 09/13/2024 2.3  1.0 - 4.0 % Final    Basophils % 09/13/2024 1.0  0.0 - 1.0 % Final    Immature Granulocytes % 09/13/2024 0.3  0.0 - 0.4 % Final    nRBC, Auto 09/13/2024 0.0  <=0.0 % Final    Neutrophils, Abs 09/13/2024 1.89  1.80 - 7.70 K/uL Final    Lymphocytes, Absolute 09/13/2024 0.82 (L)  1.00 - 4.80 K/uL Final    Monocytes, Absolute 09/13/2024 0.22  0.00 - 0.80 K/uL Final    Eosinophils, Absolute 09/13/2024 0.07  0.00 - 0.50 K/uL Final    Basophils, Absolute 09/13/2024 0.03  0.00 - 0.20 K/uL Final    Immature Granulocytes, Absolute 09/13/2024 0.01  0.00 - 0.04 K/uL Final    nRBC, Absolute 09/13/2024 0.00  <=0.00 x10e3/uL Final    Diff Type 09/13/2024 Auto   Final    Tacrolimus 09/13/2024 2.7 (L)  5.0-15.0 (Trough) ng/mL Final   Office Visit on 08/27/2024   Component Date Value Ref Range Status    Final Diagnosis 08/27/2024    Final                    Value:This result contains rich text formatting which cannot be displayed here.    Comments 08/27/2024    Final                    Value:This result contains rich text formatting which cannot be displayed here.    Specimen A Procedure 08/27/2024 Shave Biopsy   Final    Specimen A Morphology 08/27/2024 irregular brown macule   Final    Specimen A DDx 08/27/2024 nevus r/o atypia   Final    Specimen B Procedure 08/27/2024 Shave Biopsy   Final    Specimen B Morphology 08/27/2024 irregular brown macule   Final    Specimen B DDx 08/27/2024 nevus r/o atypia   Final    Microscopic Description 08/27/2024    Final                     Value:This result contains rich text formatting which cannot be displayed here.    Gross Description 08/27/2024    Final                    Value:This result contains rich text formatting which cannot be displayed here.    Laboratory Notes 08/27/2024    Final                    Value:This result contains rich text formatting which cannot be displayed here.    Case Report 08/27/2024    Final                    Value:Surgical Pathology                                Case: [id number]                                   Authorizing Provider:  Unique Witt MD       Collected:           08/27/2024 05:06 PM          Ordering Location:     [facility] Received:            08/28/2024 08:15 AM          Pathologist:           Hima Vallejo MD                                                      Specimens:   A) - Face, L cheek                                                                                  B) - Other, please specify, mid back                                                      Lab Visit on 08/14/2024   Component Date Value Ref Range Status    Vitamin D 25-Hydroxy, Blood 08/14/2024 40.2  ng/mL Final   Lab Visit on 07/19/2024   Component Date Value Ref Range Status    Phosphorus 07/19/2024 3.5  2.5 - 4.5 mg/dL Final    Magnesium 07/19/2024 1.9  1.7 - 2.3 mg/dL Final    GGT 07/19/2024 16  5 - 55 U/L Final    Sodium 07/19/2024 130 (L)  136 - 145 mmol/L Final    Potassium 07/19/2024 4.5  3.5 - 5.1 mmol/L Final    Chloride 07/19/2024 99  98 - 107 mmol/L Final    CO2 07/19/2024 27  21 - 32 mmol/L Final    Anion Gap 07/19/2024 9  7 - 16 mmol/L Final    Glucose 07/19/2024 112 (H)  74 - 106 mg/dL Final    BUN 07/19/2024 17  7 - 18 mg/dL Final    Creatinine 07/19/2024 1.35 (H)  0.55 - 1.02 mg/dL Final    BUN/Creatinine Ratio 07/19/2024 13  6 - 20 Final    Calcium 07/19/2024 8.7  8.5 - 10.1 mg/dL Final    Total Protein 07/19/2024 7.5  6.4 - 8.2 g/dL Final    Albumin 07/19/2024 3.9  3.5 -  5.0 g/dL Final    Globulin 07/19/2024 3.6  2.0 - 4.0 g/dL Final    A/G Ratio 07/19/2024 1.1   Final    Bilirubin, Total 07/19/2024 0.4  >0.0 - 1.2 mg/dL Final    Alk Phos 07/19/2024 123  55 - 142 U/L Final    ALT 07/19/2024 17  13 - 56 U/L Final    AST 07/19/2024 16  15 - 37 U/L Final    eGFR 07/19/2024 43 (L)  >=60 mL/min/1.73m2 Final    Bilirubin, Direct 07/19/2024 <0.1  0.0 - 0.2 mg/dL Final    WBC 07/19/2024 2.94 (L)  4.50 - 11.00 K/uL Final    RBC 07/19/2024 4.25  4.20 - 5.40 M/uL Final    Hemoglobin 07/19/2024 12.6  12.0 - 16.0 g/dL Final    Hematocrit 07/19/2024 38.2  38.0 - 47.0 % Final    MCV 07/19/2024 89.9  80.0 - 96.0 fL Final    MCH 07/19/2024 29.6  27.0 - 31.0 pg Final    MCHC 07/19/2024 33.0  32.0 - 36.0 g/dL Final    RDW 07/19/2024 13.0  11.5 - 14.5 % Final    Platelet Count 07/19/2024 114 (L)  150 - 400 K/uL Final    MPV 07/19/2024 10.4  9.4 - 12.4 fL Final    Neutrophils % 07/19/2024 63.3  53.0 - 65.0 % Final    Lymphocytes % 07/19/2024 26.2 (L)  27.0 - 41.0 % Final    Monocytes % 07/19/2024 6.5 (H)  2.0 - 6.0 % Final    Eosinophils % 07/19/2024 2.7  1.0 - 4.0 % Final    Basophils % 07/19/2024 1.0  0.0 - 1.0 % Final    Immature Granulocytes % 07/19/2024 0.3  0.0 - 0.4 % Final    nRBC, Auto 07/19/2024 0.0  <=0.0 % Final    Neutrophils, Abs 07/19/2024 1.86  1.80 - 7.70 K/uL Final    Lymphocytes, Absolute 07/19/2024 0.77 (L)  1.00 - 4.80 K/uL Final    Monocytes, Absolute 07/19/2024 0.19  0.00 - 0.80 K/uL Final    Eosinophils, Absolute 07/19/2024 0.08  0.00 - 0.50 K/uL Final    Basophils, Absolute 07/19/2024 0.03  0.00 - 0.20 K/uL Final    Immature Granulocytes, Absolute 07/19/2024 0.01  0.00 - 0.04 K/uL Final    nRBC, Absolute 07/19/2024 0.00  <=0.00 x10e3/uL Final    Diff Type 07/19/2024 Auto   Final    Tacrolimus 07/19/2024 2.7 (L)  5.0-15.0 (Trough) ng/mL Final         No orders of the defined types were placed in this encounter.      Requested Prescriptions      No prescriptions requested or  ordered in this encounter       Assessment:     1. Lumbar radiculopathy, chronic    2. Chronic bilateral thoracic back pain             Plan:    Not using narcotics from our office November 26, 2024     Follows spine surgery [facility]     Follow-up after lumbar L5/S1 COLLEEN # 1 November 12, 2024  Patient states she had 90% relief after procedure  Procedure did help improve her level function     Procedure note/fluoro images reviewed     MRI of the lumbar from [facility] September 06/20/2024 revealed L3-4 left foraminal stenosis, L4-5 bilateral stenosis and L5-S1 central disc protrusion and severe left foraminal stenosis      She states she has grateful for procedure did help improve her pain      She would like to continue with conservative management she is doing very well after procedure      Continue home exercise program      Follow-up as needed, patient request     Dr. Adorno November 2025       Bring original prescription medication bottles/container/box with labels to each visit

## 2024-11-26 NOTE — PROGRESS NOTES
Subjective:         Patient ID: Nadia Salas is a 64 y.o. female.    Chief Complaint: Follow-up      Follow-up  This is a chronic problem. The current episode started more than 1 year ago. The problem occurs daily. The problem has been waxing and waning. Associated symptoms include arthralgias. Pertinent negatives include no anorexia, change in bowel habit, chest pain, chills, coughing, diaphoresis, fever, neck pain, rash, sore throat, swollen glands, urinary symptoms, vertigo or vomiting.     Review of Systems   Constitutional:  Negative for activity change, appetite change, chills, diaphoresis, fever and unexpected weight change.   HENT:  Negative for drooling, ear discharge, ear pain, facial swelling, nosebleeds, sore throat, trouble swallowing, voice change and goiter.    Eyes:  Negative for photophobia, pain, discharge, redness and visual disturbance.   Respiratory:  Negative for apnea, cough, choking, chest tightness, shortness of breath, wheezing and stridor.    Cardiovascular:  Negative for chest pain, palpitations and leg swelling.   Gastrointestinal:  Negative for abdominal distention, anorexia, change in bowel habit, diarrhea, rectal pain, vomiting and fecal incontinence.   Endocrine: Negative for cold intolerance, heat intolerance, polydipsia, polyphagia and polyuria.   Genitourinary:  Negative for bladder incontinence, dysuria, flank pain, frequency and hot flashes.   Musculoskeletal:  Positive for arthralgias, back pain and leg pain. Negative for neck pain.   Integumentary:  Negative for color change, pallor and rash.   Allergic/Immunologic: Negative for immunocompromised state.   Neurological:  Negative for dizziness, vertigo, seizures, syncope, facial asymmetry, speech difficulty, light-headedness, memory loss and coordination difficulties.   Hematological:  Negative for adenopathy. Does not bruise/bleed easily.   Psychiatric/Behavioral:  Negative for agitation, behavioral problems,  confusion, decreased concentration, dysphoric mood, hallucinations, self-injury and suicidal ideas. The patient is not nervous/anxious and is not hyperactive.            Past Medical History:   Diagnosis Date    Abnormal uterine bleeding (AUB) 04/28/2021    Elevated serum creatinine 07/27/2021    Rechecked at 2.1 following completion of treatment for Hep C.    Hepatitis C     Liver transplant recipient 04/06/2022    Last Assessment & Plan:   Formatting of this note might be different from the original.  OLT 4/5/22  DCD  CIT 8:49  WIT 34 min   Duct to duct  chronic HCV now s/p treatment in 2021 with SVR  With decompensated cirrhosis MELD 26  LFTs trending down       Past Surgical History:   Procedure Laterality Date    CHOLECYSTECTOMY      EPIDURAL STEROID INJECTION INTO LUMBAR SPINE Bilateral 11/12/2024    Procedure: L5-S1 COLLEEN;  Surgeon: Krista Adorno MD;  Location: [facility];  Service: Pain Management;  Laterality: Bilateral;    HERNIA REPAIR      HYSTERECTOMY      LIVER TRANSPLANT      ROBOT-ASSISTED LAPAROSCOPIC HYSTERECTOMY N/A 04/29/2021    Procedure: ROBOTIC HYSTERECTOMY;  Surgeon: Swapna Sow DO;  Location: [facility] OR;  Service: OB/GYN;  Laterality: N/A;    ROBOT-ASSISTED LAPAROSCOPIC SALPINGO-OOPHORECTOMY Bilateral 04/29/2021    Procedure: ROBOTIC SALPINGO-OOPHORECTOMY;  Surgeon: Swapna Sow DO;  Location: [facility] OR;  Service: OB/GYN;  Laterality: Bilateral;    TONSILLECTOMY       Social History     Socioeconomic History    Marital status: Single   Tobacco Use    Smoking status: Never    Smokeless tobacco: Never   Substance and Sexual Activity    Alcohol use: Not Currently    Drug use: Never    Sexual activity: Not Currently     Social Drivers of Health     Financial Resource Strain: Low Risk  (4/23/2024)    Overall Financial Resource Strain (CARDIA)     Difficulty of Paying Living Expenses: Not very hard   Food Insecurity: No Food Insecurity (4/23/2024)    Hunger Vital Sign  "    Worried About Running Out of Food in the Last Year: Never true     Ran Out of Food in the Last Year: Never true   Transportation Needs: No Transportation Needs (4/23/2024)    PRAPARE - Transportation     Lack of Transportation (Medical): No     Lack of Transportation (Non-Medical): No   Physical Activity: Insufficiently Active (4/23/2024)    Exercise Vital Sign     Days of Exercise per Week: 4 days     Minutes of Exercise per Session: 30 min   Stress: No Stress Concern Present (4/23/2024)    [employer] - Occupational Stress Questionnaire     Feeling of Stress : Not at all   Housing Stability: Unknown (4/23/2024)    Housing Stability Vital Sign     Unable to Pay for Housing in the Last Year: No     Family History   Problem Relation Name Age of Onset    Heart disease Father Father     No Known Problems Mother       Review of patient's allergies indicates:  No Known Allergies     Objective:  Vitals:    11/26/24 1424   BP: 138/84   Pulse: 84   Resp: 20   Weight: 72.1 kg (159 lb)   Height: 5' 4" (1.626 m)   PainSc: 0-No pain         Physical Exam  Vitals and nursing note reviewed. Exam conducted with a chaperone present.   Constitutional:       General: She is awake. She is not in acute distress.     Appearance: Normal appearance. She is not ill-appearing, toxic-appearing or diaphoretic.   HENT:      Head: Normocephalic and atraumatic.      Nose: Nose normal.      Mouth/Throat:      Mouth: Mucous membranes are moist.      Pharynx: Oropharynx is clear.   Eyes:      Conjunctiva/sclera: Conjunctivae normal.      Pupils: Pupils are equal, round, and reactive to light.   Cardiovascular:      Rate and Rhythm: Normal rate.   Pulmonary:      Effort: Pulmonary effort is normal. No respiratory distress.   Abdominal:      Palpations: Abdomen is soft.      Tenderness: There is no guarding.   Musculoskeletal:         General: Normal range of motion.      Cervical back: Normal range of motion and neck " supple. No rigidity.   Skin:     General: Skin is warm and dry.      Coloration: Skin is not jaundiced or pale.   Neurological:      General: No focal deficit present.      Mental Status: She is alert and oriented to person, place, and time. Mental status is at baseline.      Cranial Nerves: No cranial nerve deficit (II-XII).   Psychiatric:         Mood and Affect: Mood normal.         Behavior: Behavior normal. Behavior is cooperative.         Thought Content: Thought content normal.           FL Fluoro for Pain Management  See OP Notes for results.     IMPRESSION: See OP Notes for results.     This procedure was auto-finalized by: Virtual Radiologist       Lab Visit on 11/08/2024   Component Date Value Ref Range Status    Phosphorus 11/08/2024 3.5  2.5 - 4.5 mg/dL Final    Magnesium 11/08/2024 1.9  1.7 - 2.3 mg/dL Final    GGT 11/08/2024 11  5 - 55 U/L Final    Sodium 11/08/2024 130 (L)  136 - 145 mmol/L Final    Potassium 11/08/2024 4.9  3.5 - 5.1 mmol/L Final    Chloride 11/08/2024 99  98 - 107 mmol/L Final    CO2 11/08/2024 26  21 - 32 mmol/L Final    Anion Gap 11/08/2024 10  7 - 16 mmol/L Final    Glucose 11/08/2024 101  74 - 106 mg/dL Final    BUN 11/08/2024 16  7 - 18 mg/dL Final    Creatinine 11/08/2024 1.29 (H)  0.55 - 1.02 mg/dL Final    BUN/Creatinine Ratio 11/08/2024 12  6 - 20 Final    Calcium 11/08/2024 8.9  8.5 - 10.1 mg/dL Final    Total Protein 11/08/2024 7.4  6.4 - 8.2 g/dL Final    Albumin 11/08/2024 3.5  3.5 - 5.0 g/dL Final    Globulin 11/08/2024 3.9  2.0 - 4.0 g/dL Final    A/G Ratio 11/08/2024 0.9   Final    Bilirubin, Total 11/08/2024 0.5  >0.0 - 1.2 mg/dL Final    Alk Phos 11/08/2024 104  55 - 142 U/L Final    ALT 11/08/2024 13  13 - 56 U/L Final    AST 11/08/2024 14 (L)  15 - 37 U/L Final    eGFR 11/08/2024 46 (L)  >=60 mL/min/1.73m2 Final    Bilirubin, Direct 11/08/2024 <0.1  0.0 - 0.2 mg/dL Final    WBC 11/08/2024 2.85 (L)  4.50 - 11.00 K/uL Final    RBC 11/08/2024 4.35  4.20 - 5.40 M/uL  Final    Hemoglobin 11/08/2024 12.8  12.0 - 16.0 g/dL Final    Hematocrit 11/08/2024 39.0  38.0 - 47.0 % Final    MCV 11/08/2024 89.7  80.0 - 96.0 fL Final    MCH 11/08/2024 29.4  27.0 - 31.0 pg Final    MCHC 11/08/2024 32.8  32.0 - 36.0 g/dL Final    RDW 11/08/2024 13.0  11.5 - 14.5 % Final    Platelet Count 11/08/2024 117 (L)  150 - 400 K/uL Final    MPV 11/08/2024 10.5  9.4 - 12.4 fL Final    Neutrophils % 11/08/2024 59.5  53.0 - 65.0 % Final    Lymphocytes % 11/08/2024 30.9  27.0 - 41.0 % Final    Monocytes % 11/08/2024 6.0  2.0 - 6.0 % Final    Eosinophils % 11/08/2024 2.5  1.0 - 4.0 % Final    Basophils % 11/08/2024 0.7  0.0 - 1.0 % Final    Immature Granulocytes % 11/08/2024 0.4  0.0 - 0.4 % Final    nRBC, Auto 11/08/2024 0.0  <=0.0 % Final    Neutrophils, Abs 11/08/2024 1.70 (L)  1.80 - 7.70 K/uL Final    Lymphocytes, Absolute 11/08/2024 0.88 (L)  1.00 - 4.80 K/uL Final    Monocytes, Absolute 11/08/2024 0.17  0.00 - 0.80 K/uL Final    Eosinophils, Absolute 11/08/2024 0.07  0.00 - 0.50 K/uL Final    Basophils, Absolute 11/08/2024 0.02  0.00 - 0.20 K/uL Final    Immature Granulocytes, Absolute 11/08/2024 0.01  0.00 - 0.04 K/uL Final    nRBC, Absolute 11/08/2024 0.00  <=0.00 x10e3/uL Final    Diff Type 11/08/2024 Auto   Final    Tacrolimus 11/08/2024 5.0  5.0-15.0 (Trough) ng/mL Final   Office Visit on 10/16/2024   Component Date Value Ref Range Status    POC Amphetamines 10/16/2024 Negative  Negative, Inconclusive Final    POC Barbiturates 10/16/2024 Negative  Negative, Inconclusive Final    POC Benzodiazepines 10/16/2024 Negative  Negative, Inconclusive Final    POC Cocaine 10/16/2024 Negative  Negative, Inconclusive Final    POC THC 10/16/2024 Negative  Negative, Inconclusive Final    POC Methadone 10/16/2024 Negative  Negative, Inconclusive Final    POC Methamphetamine 10/16/2024 Negative  Negative, Inconclusive Final    POC Opiates 10/16/2024 Negative  Negative, Inconclusive Final    POC Oxycodone  10/16/2024 Negative  Negative, Inconclusive Final    POC Phencyclidine 10/16/2024 Negative  Negative, Inconclusive Final    POC Methylenedioxymethamphetamine * 10/16/2024 Negative  Negative, Inconclusive Final    POC Tricyclic Antidepressants 10/16/2024 Negative  Negative, Inconclusive Final    POC Buprenorphine 10/16/2024 Negative   Final     Acceptable 10/16/2024 Yes   Final    POC Temperature (Urine) 10/16/2024 94   Final    Creatinine, U 10/16/2024 15.3  mg/dL Final    Specific Gravity 10/16/2024 1.005   Final    pH 10/16/2024 6.0   Final    Oxidants 10/16/2024 Negative  Cutoff: 200 mg/L Final    Comment 10/16/2024 Normal   Final    Codeine 10/16/2024 Not Detected  Cutoff: 25 ng/mL Final    C6BG 10/16/2024 Not Detected  Cutoff: 100 ng/mL Final    Morphine 10/16/2024 Not Detected  Cutoff: 25 ng/mL Final    M6BG 10/16/2024 Not Detected  Cutoff: 100 ng/mL Final    6 Monoacetylmorphine 10/16/2024 Not Detected  Cutoff: 25 ng/mL Final    Hydrocodone 10/16/2024 Not Detected  Cutoff: 25 ng/mL Final    Norhydrocodone 10/16/2024 Not Detected  Cutoff: 25 ng/mL Final    Dihydrocodeine 10/16/2024 Not Detected  Cutoff: 25 ng/mL Final    Hydromorphone 10/16/2024 Not Detected  Cutoff: 25 ng/mL Final    Hydromorphone-3-beta-glucuronide 10/16/2024 Not Detected  Cutoff: 100 ng/mL Final    Oxycodone 10/16/2024 Not Detected  Cutoff: 25 ng/mL Final    Noroxycodone 10/16/2024 Not Detected  Cutoff: 25 ng/mL Final    Oxymorphone 10/16/2024 Not Detected  Cutoff: 25 ng/mL Final    Oxymorphone-3-beta-glucuronid 10/16/2024 Not Detected  Cutoff: 100 ng/mL Final    Noroxymorphone 10/16/2024 Not Detected  Cutoff: 25 ng/mL Final    Fentanyl 10/16/2024 Not Detected  Cutoff: 2 ng/mL Final    Norfentanyl 10/16/2024 Not Detected  Cutoff: 2 ng/mL Final    Meperidine 10/16/2024 Not Detected  Cutoff: 25 ng/mL Final    Normeperidine 10/16/2024 Not Detected  Cutoff: 25 ng/mL Final    Naloxone 10/16/2024 Not Detected  Cutoff: 25 ng/mL  Final    Naloxone-3-beta-glucuronide 10/16/2024 Not Detected  Cutoff: 100 ng/mL Final    Methadone 10/16/2024 Not Detected  Cutoff: 25 ng/mL Final    EDDP 10/16/2024 Not Detected  Cutoff: 25 ng/mL Final    Propoxyphene 10/16/2024 Not Detected  Cutoff: 25 ng/mL Final    Norpropoxyphene 10/16/2024 Not Detected  Cutoff: 25 ng/mL Final    Tramadol 10/16/2024 Not Detected  Cutoff: 25 ng/mL Final    O-desmethyltramadol 10/16/2024 Not Detected  Cutoff: 25 ng/mL Final    Tapentadol 10/16/2024 Not Detected  Cutoff: 25 ng/mL Final    N-Desmethyltapentadol 10/16/2024 Not Detected  Cutoff: 50 ng/mL Final    M TAPENTADOL-BETA-GLUCURONIDE 10/16/2024 Not Detected  Cutoff: 100 ng/mL Final    Buprenorphine 10/16/2024 Not Detected  Cutoff: 5 ng/mL Final    Norbuprenorphine 10/16/2024 Not Detected  Cutoff: 5 ng/mL Final    Norbuprenorphine glucuronide 10/16/2024 Not Detected  Cutoff: 20 ng/mL Final    Opioid Interpretation 10/16/2024 SEE COMMENTS   Final    Cocaine 10/16/2024 Negative  Cutoff: 150 ng/mL Final    Tetrahydrocannabinol 10/16/2024 Negative  Cutoff: 50 ng/mL Final    Alprazolam 10/16/2024 Not Detected  Cutoff: 10 ng/mL Final    Alpha-Hydroxyalprazolam 10/16/2024 Not Detected  Cutoff: 10 ng/mL Final    Alpha-Hydroxyalprazolam Glucuronide 10/16/2024 Not Detected  Cutoff: 50 ng/mL Final    Chlordiazepoxide 10/16/2024 Not Detected  Cutoff: 10 ng/mL Final    Clobazam 10/16/2024 Not Detected  Cutoff: 10 ng/mL Final    N-Desmethylclobazam 10/16/2024 Not Detected  Cutoff: 200 ng/mL Final    Clonazepam 10/16/2024 Not Detected  Cutoff: 10 ng/mL Final    7-aminoclonazepam 10/16/2024 Not Detected  Cutoff: 10 ng/mL Final    Diazepam 10/16/2024 Not Detected  Cutoff: 10 ng/mL Final    Nordiazepam 10/16/2024 Not Detected  Cutoff: 10 ng/mL Final    Flunitrazepam 10/16/2024 Not Detected  Cutoff: 10 ng/mL Final    7-aminoflunitrazepam 10/16/2024 Not Detected  Cutoff: 10 ng/mL Final    Flurazepam 10/16/2024 Not Detected  Cutoff: 10 ng/mL  Final    2-Hydroxy Ethyl Flurazepam 10/16/2024 Not Detected  Cutoff: 10 ng/mL Final    Lorazepam 10/16/2024 Not Detected  Cutoff: 10 ng/mL Final    Lorazepam Glucuronide 10/16/2024 Not Detected  Cutoff: 50 ng/mL Final    Midazolam 10/16/2024 Not Detected  Cutoff: 10 ng/mL Final    Alpha-Hydroxy Midazolam 10/16/2024 Not Detected  Cutoff: 10 ng/mL Final    Oxazepam 10/16/2024 Not Detected  Cutoff: 10 ng/mL Final    Oxazepam Glucuronide 10/16/2024 Not Detected  Cutoff: 50 ng/mL Final    Prazepam 10/16/2024 Not Detected  Cutoff: 10 ng/mL Final    Temazepam 10/16/2024 Not Detected  Cutoff: 10 ng/mL Final    Temazepam Glucuronide 10/16/2024 Not Detected  Cutoff: 50 ng/mL Final    Triazolam 10/16/2024 Not Detected  Cutoff: 10 ng/mL Final    Alpha-Hydroxy Triazolam 10/16/2024 Not Detected  Cutoff: 10 ng/mL Final    Zolpidem 10/16/2024 Not Detected  Cutoff: 10 ng/mL Final    Zolpidem Phenyl-4-Carboxylic acid 10/16/2024 Not Detected  Cutoff: 10 ng/mL Final    Benzodiazepine Interpretation 10/16/2024 SEE COMMENTS   Final    List Patient's Current Medications 10/16/2024 na   Final    Methamphetamine 10/16/2024 Not Detected  Cutoff: 100 ng/mL Final    Amphetamine 10/16/2024 Not Detected  Cutoff: 100 ng/mL Final    3,4-methylenedioxymethamphetamine * 10/16/2024 Not Detected  Cutoff: 100 ng/mL Final    3,0-elsnkkribjejqe-R-ethylamphetam* 10/16/2024 Not Detected  Cutoff: 100 ng/mL Final    3,4-methylenedioxyamphetamine (MDA) 10/16/2024 Not Detected  Cutoff: 100 ng/mL Final    Ephedrine 10/16/2024 Not Detected  Cutoff: 100 ng/mL Final    Pseudoephedrine 10/16/2024 Not Detected  Cutoff: 100 ng/mL Final    Phentermine 10/16/2024 Not Detected  Cutoff: 100 ng/mL Final    Phencyclidine (PCP) 10/16/2024 Not Detected  Cutoff: 20 ng/mL Final    Methylphenidate 10/16/2024 Not Detected  Cutoff: 20 ng/mL Final    Ritalinic acid 10/16/2024 Not Detected  Cutoff: 100 ng/mL Final    Stimulant Interpretation 10/16/2024 SEE COMMENTS   Final     Barbiturates 10/16/2024 Negative  Cutoff: 200 ng/mL Final   Office Visit on 09/29/2024   Component Date Value Ref Range Status    POC Rapid COVID 09/29/2024 Positive (A)  Negative Final     Acceptable 09/29/2024 Yes   Final    POC Molecular Influenza A Ag 09/29/2024 Negative  Negative Final    POC Molecular Influenza B Ag 09/29/2024 Negative  Negative Final     Acceptable 09/29/2024 Yes   Final   Lab Visit on 09/13/2024   Component Date Value Ref Range Status    Hemoglobin A1C 09/13/2024 4.8  4.5 - 6.6 % Final    Estimated Average Glucose 09/13/2024 91  mg/dL Final    Phosphorus 09/13/2024 3.8  2.5 - 4.5 mg/dL Final    Magnesium 09/13/2024 1.8  1.7 - 2.3 mg/dL Final    GGT 09/13/2024 15  5 - 55 U/L Final    Sodium 09/13/2024 129 (L)  136 - 145 mmol/L Final    Potassium 09/13/2024 4.9  3.5 - 5.1 mmol/L Final    Chloride 09/13/2024 98  98 - 107 mmol/L Final    CO2 09/13/2024 28  21 - 32 mmol/L Final    Anion Gap 09/13/2024 8  7 - 16 mmol/L Final    Glucose 09/13/2024 113 (H)  74 - 106 mg/dL Final    BUN 09/13/2024 18  7 - 18 mg/dL Final    Creatinine 09/13/2024 1.31 (H)  0.55 - 1.02 mg/dL Final    BUN/Creatinine Ratio 09/13/2024 14  6 - 20 Final    Calcium 09/13/2024 8.8  8.5 - 10.1 mg/dL Final    Total Protein 09/13/2024 7.3  6.4 - 8.2 g/dL Final    Albumin 09/13/2024 3.7  3.5 - 5.0 g/dL Final    Globulin 09/13/2024 3.6  2.0 - 4.0 g/dL Final    A/G Ratio 09/13/2024 1.0   Final    Bilirubin, Total 09/13/2024 0.4  >0.0 - 1.2 mg/dL Final    Alk Phos 09/13/2024 112  55 - 142 U/L Final    ALT 09/13/2024 17  13 - 56 U/L Final    AST 09/13/2024 16  15 - 37 U/L Final    eGFR 09/13/2024 45 (L)  >=60 mL/min/1.73m2 Final    Bilirubin, Direct 09/13/2024 0.1  0.0 - 0.2 mg/dL Final    WBC 09/13/2024 3.04 (L)  4.50 - 11.00 K/uL Final    RBC 09/13/2024 3.95 (L)  4.20 - 5.40 M/uL Final    Hemoglobin 09/13/2024 11.5 (L)  12.0 - 16.0 g/dL Final    Hematocrit 09/13/2024 36.2 (L)  38.0 - 47.0 % Final    MCV  09/13/2024 91.6  80.0 - 96.0 fL Final    MCH 09/13/2024 29.1  27.0 - 31.0 pg Final    MCHC 09/13/2024 31.8 (L)  32.0 - 36.0 g/dL Final    RDW 09/13/2024 13.0  11.5 - 14.5 % Final    Platelet Count 09/13/2024 124 (L)  150 - 400 K/uL Final    MPV 09/13/2024 9.9  9.4 - 12.4 fL Final    Neutrophils % 09/13/2024 62.2  53.0 - 65.0 % Final    Lymphocytes % 09/13/2024 27.0  27.0 - 41.0 % Final    Monocytes % 09/13/2024 7.2 (H)  2.0 - 6.0 % Final    Eosinophils % 09/13/2024 2.3  1.0 - 4.0 % Final    Basophils % 09/13/2024 1.0  0.0 - 1.0 % Final    Immature Granulocytes % 09/13/2024 0.3  0.0 - 0.4 % Final    nRBC, Auto 09/13/2024 0.0  <=0.0 % Final    Neutrophils, Abs 09/13/2024 1.89  1.80 - 7.70 K/uL Final    Lymphocytes, Absolute 09/13/2024 0.82 (L)  1.00 - 4.80 K/uL Final    Monocytes, Absolute 09/13/2024 0.22  0.00 - 0.80 K/uL Final    Eosinophils, Absolute 09/13/2024 0.07  0.00 - 0.50 K/uL Final    Basophils, Absolute 09/13/2024 0.03  0.00 - 0.20 K/uL Final    Immature Granulocytes, Absolute 09/13/2024 0.01  0.00 - 0.04 K/uL Final    nRBC, Absolute 09/13/2024 0.00  <=0.00 x10e3/uL Final    Diff Type 09/13/2024 Auto   Final    Tacrolimus 09/13/2024 2.7 (L)  5.0-15.0 (Trough) ng/mL Final   Office Visit on 08/27/2024   Component Date Value Ref Range Status    Final Diagnosis 08/27/2024    Final                    Value:This result contains rich text formatting which cannot be displayed here.    Comments 08/27/2024    Final                    Value:This result contains rich text formatting which cannot be displayed here.    Specimen A Procedure 08/27/2024 Shave Biopsy   Final    Specimen A Morphology 08/27/2024 irregular brown macule   Final    Specimen A DDx 08/27/2024 nevus r/o atypia   Final    Specimen B Procedure 08/27/2024 Shave Biopsy   Final    Specimen B Morphology 08/27/2024 irregular brown macule   Final    Specimen B DDx 08/27/2024 nevus r/o atypia   Final    Microscopic Description 08/27/2024    Final                     Value:This result contains rich text formatting which cannot be displayed here.    Gross Description 08/27/2024    Final                    Value:This result contains rich text formatting which cannot be displayed here.    Laboratory Notes 08/27/2024    Final                    Value:This result contains rich text formatting which cannot be displayed here.    Case Report 08/27/2024    Final                    Value:Surgical Pathology                                Case: [id number]                                   Authorizing Provider:  Unique Witt MD       Collected:           08/27/2024 05:06 PM          Ordering Location:     [facility] Received:            08/28/2024 08:15 AM          Pathologist:           Hima Vallejo MD                                                      Specimens:   A) - Face, L cheek                                                                                  B) - Other, please specify, mid back                                                      Lab Visit on 08/14/2024   Component Date Value Ref Range Status    Vitamin D 25-Hydroxy, Blood 08/14/2024 40.2  ng/mL Final   Lab Visit on 07/19/2024   Component Date Value Ref Range Status    Phosphorus 07/19/2024 3.5  2.5 - 4.5 mg/dL Final    Magnesium 07/19/2024 1.9  1.7 - 2.3 mg/dL Final    GGT 07/19/2024 16  5 - 55 U/L Final    Sodium 07/19/2024 130 (L)  136 - 145 mmol/L Final    Potassium 07/19/2024 4.5  3.5 - 5.1 mmol/L Final    Chloride 07/19/2024 99  98 - 107 mmol/L Final    CO2 07/19/2024 27  21 - 32 mmol/L Final    Anion Gap 07/19/2024 9  7 - 16 mmol/L Final    Glucose 07/19/2024 112 (H)  74 - 106 mg/dL Final    BUN 07/19/2024 17  7 - 18 mg/dL Final    Creatinine 07/19/2024 1.35 (H)  0.55 - 1.02 mg/dL Final    BUN/Creatinine Ratio 07/19/2024 13  6 - 20 Final    Calcium 07/19/2024 8.7  8.5 - 10.1 mg/dL Final    Total Protein 07/19/2024 7.5  6.4 - 8.2 g/dL Final    Albumin 07/19/2024 3.9  3.5 -  5.0 g/dL Final    Globulin 07/19/2024 3.6  2.0 - 4.0 g/dL Final    A/G Ratio 07/19/2024 1.1   Final    Bilirubin, Total 07/19/2024 0.4  >0.0 - 1.2 mg/dL Final    Alk Phos 07/19/2024 123  55 - 142 U/L Final    ALT 07/19/2024 17  13 - 56 U/L Final    AST 07/19/2024 16  15 - 37 U/L Final    eGFR 07/19/2024 43 (L)  >=60 mL/min/1.73m2 Final    Bilirubin, Direct 07/19/2024 <0.1  0.0 - 0.2 mg/dL Final    WBC 07/19/2024 2.94 (L)  4.50 - 11.00 K/uL Final    RBC 07/19/2024 4.25  4.20 - 5.40 M/uL Final    Hemoglobin 07/19/2024 12.6  12.0 - 16.0 g/dL Final    Hematocrit 07/19/2024 38.2  38.0 - 47.0 % Final    MCV 07/19/2024 89.9  80.0 - 96.0 fL Final    MCH 07/19/2024 29.6  27.0 - 31.0 pg Final    MCHC 07/19/2024 33.0  32.0 - 36.0 g/dL Final    RDW 07/19/2024 13.0  11.5 - 14.5 % Final    Platelet Count 07/19/2024 114 (L)  150 - 400 K/uL Final    MPV 07/19/2024 10.4  9.4 - 12.4 fL Final    Neutrophils % 07/19/2024 63.3  53.0 - 65.0 % Final    Lymphocytes % 07/19/2024 26.2 (L)  27.0 - 41.0 % Final    Monocytes % 07/19/2024 6.5 (H)  2.0 - 6.0 % Final    Eosinophils % 07/19/2024 2.7  1.0 - 4.0 % Final    Basophils % 07/19/2024 1.0  0.0 - 1.0 % Final    Immature Granulocytes % 07/19/2024 0.3  0.0 - 0.4 % Final    nRBC, Auto 07/19/2024 0.0  <=0.0 % Final    Neutrophils, Abs 07/19/2024 1.86  1.80 - 7.70 K/uL Final    Lymphocytes, Absolute 07/19/2024 0.77 (L)  1.00 - 4.80 K/uL Final    Monocytes, Absolute 07/19/2024 0.19  0.00 - 0.80 K/uL Final    Eosinophils, Absolute 07/19/2024 0.08  0.00 - 0.50 K/uL Final    Basophils, Absolute 07/19/2024 0.03  0.00 - 0.20 K/uL Final    Immature Granulocytes, Absolute 07/19/2024 0.01  0.00 - 0.04 K/uL Final    nRBC, Absolute 07/19/2024 0.00  <=0.00 x10e3/uL Final    Diff Type 07/19/2024 Auto   Final    Tacrolimus 07/19/2024 2.7 (L)  5.0-15.0 (Trough) ng/mL Final         No orders of the defined types were placed in this encounter.      Requested Prescriptions      No prescriptions requested or  ordered in this encounter       Assessment:     1. Lumbar radiculopathy, chronic    2. Chronic bilateral thoracic back pain             Plan:    Not using narcotics from our office November 26, 2024     Follows spine surgery [facility]     Follow-up after lumbar L5/S1 COLLEEN # 1 November 12, 2024  Patient states she had 90% relief after procedure  Procedure did help improve her level function     Procedure note/fluoro images reviewed     MRI of the lumbar from [facility] September 06/20/2024 revealed L3-4 left foraminal stenosis, L4-5 bilateral stenosis and L5-S1 central disc protrusion and severe left foraminal stenosis      She states she has grateful for procedure did help improve her pain      She would like to continue with conservative management she is doing very well after procedure      Continue home exercise program      Follow-up as needed, patient request     Dr. Adorno November 2025         Bring original prescription medication bottles/container/box with labels to each visit            2 seconds or less

## 2024-11-27 ENCOUNTER — PATIENT MESSAGE (OUTPATIENT)
Dept: PAIN MEDICINE | Facility: CLINIC | Age: 64
End: 2024-11-27
Payer: MEDICARE

## 2024-12-09 ENCOUNTER — TELEPHONE (OUTPATIENT)
Dept: SPINE | Facility: CLINIC | Age: 64
End: 2024-12-09
Payer: MEDICARE

## 2024-12-16 DIAGNOSIS — R11.0 NAUSEA: ICD-10-CM

## 2024-12-17 RX ORDER — ONDANSETRON 4 MG/1
TABLET, ORALLY DISINTEGRATING ORAL
Qty: 30 TABLET | Refills: 0 | OUTPATIENT
Start: 2024-12-17

## 2025-02-17 ENCOUNTER — OFFICE VISIT (OUTPATIENT)
Dept: FAMILY MEDICINE | Facility: CLINIC | Age: 65
End: 2025-02-17
Payer: MEDICARE

## 2025-02-17 VITALS
WEIGHT: 163 LBS | TEMPERATURE: 98 F | BODY MASS INDEX: 27.83 KG/M2 | SYSTOLIC BLOOD PRESSURE: 140 MMHG | RESPIRATION RATE: 16 BRPM | DIASTOLIC BLOOD PRESSURE: 80 MMHG | OXYGEN SATURATION: 100 % | HEIGHT: 64 IN | HEART RATE: 67 BPM

## 2025-02-17 DIAGNOSIS — I10 PRIMARY HYPERTENSION: Primary | Chronic | ICD-10-CM

## 2025-02-17 DIAGNOSIS — J45.21 MILD INTERMITTENT ASTHMA WITH ACUTE EXACERBATION: Chronic | ICD-10-CM

## 2025-02-17 DIAGNOSIS — N18.31 STAGE 3A CHRONIC KIDNEY DISEASE: Chronic | ICD-10-CM

## 2025-02-17 DIAGNOSIS — Z94.4 LIVER TRANSPLANT RECIPIENT: Chronic | ICD-10-CM

## 2025-02-17 DIAGNOSIS — E87.1 HYPONATREMIA: Chronic | ICD-10-CM

## 2025-02-17 RX ORDER — NIFEDIPINE 30 MG/1
30 TABLET, EXTENDED RELEASE ORAL DAILY
Qty: 90 TABLET | Refills: 3 | Status: SHIPPED | OUTPATIENT
Start: 2025-02-17 | End: 2026-02-12

## 2025-02-17 RX ORDER — ALBUTEROL SULFATE 90 UG/1
2 INHALANT RESPIRATORY (INHALATION) EVERY 4 HOURS PRN
Qty: 18 G | Refills: 11 | Status: SHIPPED | OUTPATIENT
Start: 2025-02-17 | End: 2026-02-17

## 2025-02-17 NOTE — PROGRESS NOTES
"Subjective     Patient ID: Nadia Salas is a 64 y.o. female.    Chief Complaint: Follow-up (6 month follow up.)    History of Present Illness    CHIEF COMPLAINT:  Patient presents today for follow up of low sodium levels    ELECTROLYTE IMBALANCE:  She reports sodium levels around 130 and drinks large amounts of water since transplant per medical recommendations.    MEDICAL HISTORY:  She has a history of liver transplant with follow-up care at [facility]. She also has asthma that fluctuates in severity, though reports recent improvement. She uses albuterol inhaler as needed for symptom management during flare-ups without a daily maintenance inhaler.    BLOOD PRESSURE:  She reports occasional elevated blood pressure readings at home, though not significantly elevated.    IMMUNIZATIONS:  She received COVID-19 and influenza vaccines together approximately one month ago, and the RSV vaccine shortly after its release in 2023. She maintains up-to-date vaccination status due to her transplant history.      ROS:  Respiratory: -shortness of breath, +wheezing              Objective   Blood pressure (!) 140/80, pulse 67, temperature 97.7 °F (36.5 °C), temperature source Oral, resp. rate 16, height 5' 4" (1.626 m), weight 73.9 kg (163 lb), SpO2 100%.    Physical Exam  Constitutional:       Appearance: Normal appearance.   HENT:      Head: Normocephalic and atraumatic.      Right Ear: External ear normal.      Left Ear: External ear normal.      Nose: Nose normal.      Mouth/Throat:      Mouth: Mucous membranes are moist.   Eyes:      Conjunctiva/sclera: Conjunctivae normal.      Pupils: Pupils are equal, round, and reactive to light.   Cardiovascular:      Rate and Rhythm: Normal rate and regular rhythm.      Pulses: Normal pulses.      Heart sounds: Normal heart sounds.   Pulmonary:      Effort: Pulmonary effort is normal.      Breath sounds: Normal breath sounds.   Abdominal:      General: Abdomen is flat.      " Palpations: Abdomen is soft.   Musculoskeletal:         General: Normal range of motion.      Cervical back: Normal range of motion and neck supple.   Skin:     General: Skin is warm and dry.   Neurological:      General: No focal deficit present.      Mental Status: She is alert and oriented to person, place, and time.   Psychiatric:         Mood and Affect: Mood normal.         Behavior: Behavior normal.         Thought Content: Thought content normal.         Judgment: Judgment normal.            Assessment and Plan   Assessment & Plan    IMPRESSION:  - Assessed low sodium levels, considering potential causes such as SIADH or excessive water intake post-transplant  - Recommend liberalizing salt intake to address low sodium, aiming to maintain levels above 125 and ideally between 130-135  - Evaluated blood pressure control, noting stability on current medication regimen  - Reviewed asthma management, determining intermittent albuterol use is sufficient for symptom control  - Confirmed patient is up-to-date on vaccinations, including COVID-19, flu, pneumonia, and RSV, as recommended by transplant physician    SIADH (SYNDROME OF INAPPROPRIATE ANTIDIURETIC HORMONE):  - Explained SIADH (syndrome of inappropriate antidiuretic hormone) and its potential impact on sodium levels.  - Discussed the relationship between fluid intake and sodium levels.  - Evaluated the patient's current sodium level of 130, which is acceptable but requires monitoring.  - Warned about risks if sodium drops below 125.  - Advised the patient to liberalize salt intake to address low sodium levels.  - Recommend maintaining sodium levels within a safe range (above 125, ideally 130-135).  - Cautioned against using diuretics to avoid complicating sodium management.  - Instructed the patient to continue reporting ongoing low sodium levels.    LIVER TRANSPLANT:  - Scheduled regular follow-ups for transplant, including labs.  - Advised the patient to  drink plenty of water post-transplant.  - Arranged annual visits with the transplant specialist for ongoing care.  - Emphasized the importance of staying up-to-date on all vaccines due to liver transplant status.  - Recommend getting new vaccines as soon as they become available, as per transplant specialist's advice.    ASTHMA:  - Noted improvement in the patient's asthma symptoms.  - Acknowledged the patient's history of intermittent asthma.  - Refilled albuterol inhaler for as-needed use during asthma flare-ups.    HYPERTENSION:  - Discussed the relationship between sodium levels and blood pressure management.  - Instructed the patient to continue monitoring blood pressure at home.  - Refilled blood pressure medication.  - Continued nifedipine at the current dose, with the option to increase if blood pressure remains elevated.  - Noted that blood pressure was not high during the current visit, but the patient reports occasional high readings at home.  - Scheduled follow up in 6 months, unless blood pressure becomes unstable.    VACCINATIONS:  - Reviewed the patient's vaccination status.  - Noted that the patient received COVID-19 and high-dose influenza vaccines together about 1 month ago.  - Confirmed administration of pneumococcal vaccine (likely Prevnar 20) within the past 2 years.  - Recorded that the patient received the RSV vaccine soon after it became available in 2023.  - Instructed the patient to obtain vaccine records from [facility]'s and have them faxed to the office for complete documentation.         1. Primary hypertension  -     NIFEdipine (PROCARDIA-XL) 30 MG (OSM) 24 hr tablet; Take 1 tablet (30 mg total) by mouth once daily.  Dispense: 90 tablet; Refill: 3    2. Mild intermittent asthma with acute exacerbation  -     albuterol (PROVENTIL HFA) 90 mcg/actuation inhaler; Inhale 2 puffs into the lungs every 4 (four) hours as needed for Wheezing or Shortness of Breath. Rescue  Dispense: 18 g; Refill:  11    3. Liver transplant recipient  Overview:    OLT 4/5/22  DCD  CIT 8:49  WIT 34 min   Duct to duct  chronic HCV now s/p treatment in 2021 with SVR  With decompensated cirrhosis MELD 26  LFTs trending down      4. Stage 3a chronic kidney disease      Orders:  -     NIFEdipine (PROCARDIA-XL) 30 MG (OSM) 24 hr tablet; Take 1 tablet (30 mg total) by mouth once daily.  Dispense: 90 tablet; Refill: 3    5. Hyponatremia                     Follow up in about 1 year (around 2/17/2026) for for recheck.

## 2025-02-19 ENCOUNTER — OFFICE VISIT (OUTPATIENT)
Dept: PAIN MEDICINE | Facility: CLINIC | Age: 65
End: 2025-02-19
Payer: MEDICARE

## 2025-02-19 VITALS
WEIGHT: 165 LBS | DIASTOLIC BLOOD PRESSURE: 84 MMHG | SYSTOLIC BLOOD PRESSURE: 111 MMHG | RESPIRATION RATE: 18 BRPM | HEIGHT: 64 IN | HEART RATE: 72 BPM | BODY MASS INDEX: 28.17 KG/M2

## 2025-02-19 DIAGNOSIS — M54.6 CHRONIC BILATERAL THORACIC BACK PAIN: Chronic | ICD-10-CM

## 2025-02-19 DIAGNOSIS — M54.16 LUMBAR RADICULOPATHY, CHRONIC: Primary | Chronic | ICD-10-CM

## 2025-02-19 DIAGNOSIS — G89.29 CHRONIC BILATERAL THORACIC BACK PAIN: Chronic | ICD-10-CM

## 2025-02-19 PROCEDURE — 99214 OFFICE O/P EST MOD 30 MIN: CPT | Mod: PBBFAC | Performed by: PAIN MEDICINE

## 2025-02-19 RX ORDER — CYCLOBENZAPRINE HCL 10 MG
10 TABLET ORAL 3 TIMES DAILY PRN
Qty: 90 TABLET | Refills: 1 | Status: SHIPPED | OUTPATIENT
Start: 2025-02-19 | End: 2025-04-20

## 2025-02-19 RX ORDER — ACETAMINOPHEN AND CODEINE PHOSPHATE 300; 30 MG/1; MG/1
1 TABLET ORAL 2 TIMES DAILY PRN
Qty: 60 TABLET | Refills: 1 | Status: SHIPPED | OUTPATIENT
Start: 2025-02-19 | End: 2025-04-20

## 2025-02-19 NOTE — PROGRESS NOTES
She Disclaimer: This note has been generated using voice-recognition software. There may be typographical errors that have been missed during proof-reading        Patient ID: Nadia Salas is a 64 y.o. female.      Chief Complaint: Back Pain (Patient is having upper to lower back pain.)      64-year-old female returns for re-evaluation following bilateral L5-S1 transforaminal epidural steroid injection.  She experienced significant improvement following the procedure but over past month she has noticed more left lower extremity pain and occasionally paresthesia.  She denies falling, bowel or bladder involvement.  She remains in a  home exercise program with benefit.  Her pain is often exacerbated with prolonged walking, standing and most daily activities.  She receivers  some improvement with heating pads. She will consider a repeat  steroid injection at her return office and if indicated.            Pain Assessment  Pain Assessment: 0-10  Pain Score:   6  Pain Location: Back  Pain Orientation: Mid, Lower, Upper  Pain Descriptors: Aching, Sharp  Pain Frequency: Continuous  Pain Onset: Gradual  Clinical Progression: Gradually worsening  Aggravating Factors: Bending, Stretching, Exercise, Kneeling, Squatting, Standing, Walking, Stairs  Pain Intervention(s): Home medication, Heat applied, Rest      A's of Opioid Risk Assessment  Activity:Patient can perform ADL.   Analgesia:Patients pain is  controlled by current medication.   Adverse Effects: Patient denies constipation or sedation.  Aberrant Behavior:  reviewed with no aberrant drug seeking/taking behavior.      Patient denies any suicidal or homicidal ideations    Physical Therapy/Home Exercise: no     FL Fluoro for Pain Management  See OP Notes for results.     IMPRESSION: See OP Notes for results.     This procedure was auto-finalized by: Virtual Radiologist      Review of Systems   Constitutional: Negative.    HENT: Negative.     Eyes: Negative.     Respiratory: Negative.     Cardiovascular: Negative.    Gastrointestinal: Negative.    Endocrine: Negative.    Genitourinary: Negative.    Musculoskeletal:  Positive for arthralgias, back pain and leg pain (LLE).   Integumentary:  Negative.   Neurological:  Positive for numbness (LLE).   Hematological: Negative.    Psychiatric/Behavioral: Negative.               Past Medical History:   Diagnosis Date    Abnormal uterine bleeding (AUB) 04/28/2021    Elevated serum creatinine 07/27/2021    Rechecked at 2.1 following completion of treatment for Hep C.    Hepatitis C     Liver transplant recipient 04/06/2022    Last Assessment & Plan:   Formatting of this note might be different from the original.  OLT 4/5/22  DCD  CIT 8:49  WIT 34 min   Duct to duct  chronic HCV now s/p treatment in 2021 with SVR  With decompensated cirrhosis MELD 26  LFTs trending down       Past Surgical History:   Procedure Laterality Date    CHOLECYSTECTOMY      EPIDURAL STEROID INJECTION INTO LUMBAR SPINE Bilateral 11/12/2024    Procedure: L5-S1 COLLEEN;  Surgeon: Krista Adorno MD;  Location: [facility];  Service: Pain Management;  Laterality: Bilateral;    HERNIA REPAIR      HYSTERECTOMY      LIVER TRANSPLANT      ROBOT-ASSISTED LAPAROSCOPIC HYSTERECTOMY N/A 04/29/2021    Procedure: ROBOTIC HYSTERECTOMY;  Surgeon: Swapna Sow DO;  Location: [facility] OR;  Service: OB/GYN;  Laterality: N/A;    ROBOT-ASSISTED LAPAROSCOPIC SALPINGO-OOPHORECTOMY Bilateral 04/29/2021    Procedure: ROBOTIC SALPINGO-OOPHORECTOMY;  Surgeon: Swapna Sow DO;  Location: [facility] OR;  Service: OB/GYN;  Laterality: Bilateral;    TONSILLECTOMY       Social History[1]  Family History   Problem Relation Name Age of Onset    Heart disease Father Father     No Known Problems Mother       Review of patient's allergies indicates:  No Known Allergies  has a current medication list which includes the following prescription(s): albuterol, aspirin,  clotrimazole-betamethasone 1-0.05%, gabapentin, metronidazole, nifedipine, ondansetron, tacrolimus, acetaminophen-codeine 300-30mg, cyclobenzaprine, and pantoprazole.      Objective:  Vitals:    02/19/25 1440   BP: 111/84   Pulse: 72   Resp: 18        Physical Exam  Vitals and nursing note reviewed.   Constitutional:       General: She is not in acute distress.     Appearance: Normal appearance. She is not ill-appearing, toxic-appearing or diaphoretic.   HENT:      Head: Normocephalic and atraumatic.      Nose: Nose normal.      Mouth/Throat:      Mouth: Mucous membranes are moist.   Eyes:      Extraocular Movements: Extraocular movements intact.      Pupils: Pupils are equal, round, and reactive to light.   Cardiovascular:      Rate and Rhythm: Normal rate and regular rhythm.      Heart sounds: Normal heart sounds.   Pulmonary:      Effort: Pulmonary effort is normal. No respiratory distress.      Breath sounds: Normal breath sounds. No stridor. No wheezing or rhonchi.   Abdominal:      General: Bowel sounds are normal.      Palpations: Abdomen is soft.   Musculoskeletal:         General: No swelling or deformity.      Cervical back: Normal and normal range of motion. No spasms or tenderness. No pain with movement. Normal range of motion.      Thoracic back: Normal.      Lumbar back: Tenderness present. No spasms or bony tenderness. Decreased range of motion. Positive left straight leg raise test. Negative right straight leg raise test. No scoliosis.      Right lower leg: No edema.      Left lower leg: No edema.      Comments: Lumbar pain with flexion, extension and lateral rotation   Skin:     General: Skin is warm.   Neurological:      General: No focal deficit present.      Mental Status: She is alert and oriented to person, place, and time. Mental status is at baseline.      Cranial Nerves: No cranial nerve deficit.      Sensory: Sensation is intact. No sensory deficit.      Motor: No weakness.       Coordination: Coordination normal.      Gait: Gait normal.      Deep Tendon Reflexes: Reflexes are normal and symmetric.   Psychiatric:         Mood and Affect: Mood normal.         Behavior: Behavior normal.           Assessment:      1. Lumbar radiculopathy, chronic    2. Chronic bilateral thoracic back pain          Plan:  1. reviewed  2.Addiction, Dependency, Tolerance, Opioid abuse-misuse, Death, Diversion Discussed. Overdose reversal drug Naloxone discussed. Patient is prescribed opiates for chronic nonmalignant pain pathology.  Patient is receiving opiates which require greater than a 72 hour supply of therapy.  Patient was educated on potential dependency associated with long-term opioid use as well as decreasing efficacy with prolonged use.  Patient was advised of risks, benefits and side effects and how to utilize each medication.  Patient was also informed that any deviation from therapy protocol will  lead to discontinuation of opiates.  It is reasonable to prescribe opioid analgesics for patient based on positive response to opioid medications, lack of side effects and  limited aberrant behavior.    3.Refill/Continue medications for pain control and function       Requested Prescriptions     Signed Prescriptions Disp Refills    cyclobenzaprine (FLEXERIL) 10 MG tablet 90 tablet 1     Sig: Take 1 tablet (10 mg total) by mouth 3 (three) times daily as needed for Muscle spasms.    acetaminophen-codeine 300-30mg (TYLENOL #3) 300-30 mg Tab 60 tablet 1     Sig: Take 1 tablet by mouth 2 (two) times daily as needed.     4. Consider Lumbar COLLEEN #2 for worsening pain   5.Follow with JD Eli in 2 months for re-evaluation and medication refill       report:  Reviewed and consistent with medication use as prescribed.      The total time spent for evaluation and management on 02/21/2025 including reviewing separately obtained history, performing a medically appropriate exam and evaluation, documenting  clinical information in the health record, independently interpreting results and communicating them to the patient/family/caregiver, and ordering medications/tests/procedures was between 15-29 minutes.    The above plan and management options were discussed at length with patient. Patient is in agreement with the above and verbalized understanding. It will be communicated with the referring physician via electronic record, fax, or mail.         [1]   Social History  Socioeconomic History    Marital status: Single   Tobacco Use    Smoking status: Never    Smokeless tobacco: Never   Substance and Sexual Activity    Alcohol use: Not Currently    Drug use: Never    Sexual activity: Not Currently     Social Drivers of Health     Financial Resource Strain: Low Risk  (4/23/2024)    Overall Financial Resource Strain (CARDIA)     Difficulty of Paying Living Expenses: Not very hard   Food Insecurity: No Food Insecurity (4/23/2024)    Hunger Vital Sign     Worried About Running Out of Food in the Last Year: Never true     Ran Out of Food in the Last Year: Never true   Transportation Needs: No Transportation Needs (4/23/2024)    PRAPARE - Transportation     Lack of Transportation (Medical): No     Lack of Transportation (Non-Medical): No   Physical Activity: Insufficiently Active (4/23/2024)    Exercise Vital Sign     Days of Exercise per Week: 4 days     Minutes of Exercise per Session: 30 min   Stress: No Stress Concern Present (4/23/2024)    [employer] [place] [employer] - Occupational Stress Questionnaire     Feeling of Stress : Not at all   Housing Stability: Unknown (4/23/2024)    Housing Stability Vital Sign     Unable to Pay for Housing in the Last Year: No

## 2025-02-25 ENCOUNTER — HOSPITAL ENCOUNTER (EMERGENCY)
Facility: HOSPITAL | Age: 65
Discharge: HOME | End: 2025-02-25
Payer: MEDICARE

## 2025-02-25 VITALS
DIASTOLIC BLOOD PRESSURE: 83 MMHG | HEART RATE: 77 BPM | BODY MASS INDEX: 28.34 KG/M2 | OXYGEN SATURATION: 98 % | SYSTOLIC BLOOD PRESSURE: 159 MMHG | RESPIRATION RATE: 18 BRPM | TEMPERATURE: 98 F | HEIGHT: 64 IN | WEIGHT: 166 LBS

## 2025-02-25 DIAGNOSIS — G89.29 CHRONIC BILATERAL LOW BACK PAIN WITHOUT SCIATICA: Primary | ICD-10-CM

## 2025-02-25 DIAGNOSIS — M54.50 CHRONIC BILATERAL LOW BACK PAIN WITHOUT SCIATICA: Primary | ICD-10-CM

## 2025-02-25 PROCEDURE — 25000003 PHARM REV CODE 250

## 2025-02-25 PROCEDURE — 63600175 PHARM REV CODE 636 W HCPCS

## 2025-02-25 PROCEDURE — 99284 EMERGENCY DEPT VISIT MOD MDM: CPT | Mod: 25

## 2025-02-25 PROCEDURE — 96372 THER/PROPH/DIAG INJ SC/IM: CPT

## 2025-02-25 RX ORDER — METHYLPREDNISOLONE SOD SUCC 125 MG
125 VIAL (EA) INJECTION
Status: COMPLETED | OUTPATIENT
Start: 2025-02-25 | End: 2025-02-25

## 2025-02-25 RX ORDER — MORPHINE SULFATE 4 MG/ML
4 INJECTION, SOLUTION INTRAMUSCULAR; INTRAVENOUS
Status: COMPLETED | OUTPATIENT
Start: 2025-02-25 | End: 2025-02-25

## 2025-02-25 RX ORDER — ONDANSETRON 4 MG/1
4 TABLET, ORALLY DISINTEGRATING ORAL
Status: COMPLETED | OUTPATIENT
Start: 2025-02-25 | End: 2025-02-25

## 2025-02-25 RX ADMIN — ONDANSETRON 4 MG: 4 TABLET, ORALLY DISINTEGRATING ORAL at 04:02

## 2025-02-25 RX ADMIN — METHYLPREDNISOLONE SODIUM SUCCINATE 125 MG: 125 INJECTION, POWDER, FOR SOLUTION INTRAMUSCULAR; INTRAVENOUS at 04:02

## 2025-02-25 RX ADMIN — MORPHINE SULFATE 4 MG: 4 INJECTION, SOLUTION INTRAMUSCULAR; INTRAVENOUS at 04:02

## 2025-02-25 NOTE — DISCHARGE INSTRUCTIONS
Follow up with pain treatment in the morning for an appointment for re-evaluation. Return to the emergency department for new or worsening symptoms.

## 2025-02-26 ENCOUNTER — TELEPHONE (OUTPATIENT)
Dept: PAIN MEDICINE | Facility: CLINIC | Age: 65
End: 2025-02-26
Payer: MEDICARE

## 2025-02-26 ENCOUNTER — PATIENT MESSAGE (OUTPATIENT)
Dept: PAIN MEDICINE | Facility: CLINIC | Age: 65
End: 2025-02-26
Payer: MEDICARE

## 2025-02-26 NOTE — ED PROVIDER NOTES
Encounter Date: 2/25/2025       History     Chief Complaint   Patient presents with    Low-back Pain     Pt presents to ED via POV with c/o low back pain that has been ongoing for a while. Pt normally gets back injection but has not been able to get it due to her back doctor being out.      64-year-old female presents to the emergency department for evaluation of low back pain.  Reports history of chronic low back pain and states that she normally gets injections and back, but has not been able to get in with pain treatment doctor.  Denies paresthesias, loss of bowel/bladder control, numbness/tingling/swelling/inability to ambulate to bilateral lower extremities.    The history is provided by the patient. No  was used.   Back Pain   This is a chronic problem. The current episode started several days ago. The problem occurs intermittently. The problem has been unchanged. The pain is associated with no known injury. The pain is present in the lumbar spine. The pain radiates to the right leg. The pain is at a severity of 10/10. The pain is Worse during the night. Pertinent negatives include no fever, no numbness, no headaches, no abdominal pain, no abdominal swelling, no perianal numbness, no dysuria, no paresthesias, no tingling and no weakness. She has tried nothing for the symptoms.     Review of patient's allergies indicates:  No Known Allergies  Past Medical History:   Diagnosis Date    Abnormal uterine bleeding (AUB) 04/28/2021    Elevated serum creatinine 07/27/2021    Rechecked at 2.1 following completion of treatment for Hep C.    Hepatitis C     Liver transplant recipient 04/06/2022    Last Assessment & Plan:   Formatting of this note might be different from the original.  OLT 4/5/22  DCD  CIT 8:49  WIT 34 min   Duct to duct  chronic HCV now s/p treatment in 2021 with SVR  With decompensated cirrhosis MELD 26  LFTs trending down       Past Surgical History:   Procedure Laterality Date     CHOLECYSTECTOMY      EPIDURAL STEROID INJECTION INTO LUMBAR SPINE Bilateral 11/12/2024    Procedure: L5-S1 COLLEEN;  Surgeon: Krista Adorno MD;  Location: [facility];  Service: Pain Management;  Laterality: Bilateral;    HERNIA REPAIR      HYSTERECTOMY      LIVER TRANSPLANT      ROBOT-ASSISTED LAPAROSCOPIC HYSTERECTOMY N/A 04/29/2021    Procedure: ROBOTIC HYSTERECTOMY;  Surgeon: Swapna Sow DO;  Location: [facility] OR;  Service: OB/GYN;  Laterality: N/A;    ROBOT-ASSISTED LAPAROSCOPIC SALPINGO-OOPHORECTOMY Bilateral 04/29/2021    Procedure: ROBOTIC SALPINGO-OOPHORECTOMY;  Surgeon: Swapna Sow DO;  Location: [facility] OR;  Service: OB/GYN;  Laterality: Bilateral;    TONSILLECTOMY       Family History   Problem Relation Name Age of Onset    Heart disease Father Father     No Known Problems Mother       Social History[1]  Review of Systems   Constitutional:  Negative for activity change, appetite change, chills and fever.   Gastrointestinal:  Negative for abdominal pain.   Genitourinary:  Negative for decreased urine volume and dysuria.   Musculoskeletal:  Positive for back pain.   Neurological:  Negative for tingling, weakness, numbness, headaches and paresthesias.   Psychiatric/Behavioral:  Negative for confusion.    All other systems reviewed and are negative.      Physical Exam     Initial Vitals [02/25/25 1517]   BP Pulse Resp Temp SpO2   (!) 159/83 77 18 97.9 °F (36.6 °C) 98 %      MAP       --         Physical Exam    Vitals reviewed.  Constitutional: No distress.   HENT:   Head: Normocephalic.   Eyes: Conjunctivae are normal. Right eye exhibits no discharge. Left eye exhibits no discharge.   Neck: Neck supple.   Normal range of motion.  Cardiovascular:  Normal rate, regular rhythm and normal heart sounds.           Pulmonary/Chest: Breath sounds normal. No respiratory distress. She has no wheezes. She has no rhonchi. She exhibits no tenderness.   Abdominal: Abdomen is soft. Bowel sounds  are normal. She exhibits no distension. There is no abdominal tenderness. There is no guarding.   Musculoskeletal:         General: Normal range of motion.      Cervical back: Normal, normal range of motion and neck supple.      Thoracic back: Normal.      Lumbar back: No swelling, edema, deformity, signs of trauma or tenderness. Normal range of motion.     Lymphadenopathy:     She has no cervical adenopathy.   Neurological: She is alert and oriented to person, place, and time. She has normal strength. No sensory deficit. GCS score is 15. GCS eye subscore is 4. GCS verbal subscore is 5. GCS motor subscore is 6.   Skin: Skin is warm and dry. Capillary refill takes less than 2 seconds.   Psychiatric: She has a normal mood and affect. Her behavior is normal.         Medical Screening Exam   See Full Note    ED Course   Procedures  Labs Reviewed - No data to display       Imaging Results    None          Medications   morphine injection 4 mg (4 mg Intramuscular Given 2/25/25 1641)   methylPREDNISolone sodium succinate injection 125 mg (125 mg Intramuscular Given 2/25/25 1641)   ondansetron disintegrating tablet 4 mg (4 mg Oral Given 2/25/25 1645)     Medical Decision Making  64-year-old female presents to the emergency department for evaluation of low back pain.  Reports history of chronic low back pain and states that she normally gets injections and back, but has not been able to get in with pain treatment doctor.  Denies paresthesias, loss of bowel/bladder control, numbness/tingling/swelling/inability to ambulate to bilateral lower extremities.  Morphine, and a Solu-Medrol IM, Zofran p.o. administered in ED   Follow up and return precautions discussed with patient, who verbalized understanding   Diagnosis: Chronic low back pain    Risk  Prescription drug management.                                      Clinical Impression:   Final diagnoses:  [M54.50, G89.29] Chronic bilateral low back pain without sciatica  (Primary)        ED Disposition Condition    Discharge Stable          ED Prescriptions    None       Follow-up Information    None          Joaquin Frazier NP  02/26/25 0003         [1]   Social History  Tobacco Use    Smoking status: Never    Smokeless tobacco: Never   Substance Use Topics    Alcohol use: Not Currently    Drug use: Never        Joaquin Frazier NP  02/26/25 0003

## 2025-02-26 NOTE — TELEPHONE ENCOUNTER
Patient is instructed to see Sandro on 02/27/2025 at 1:30 pm.  Patient voices understanding. TC  ----- Message from Gladis sent at 2/26/2025  1:12 PM CST -----  Who Called: Nadia Patriciabere Howell is requesting assistance/information from provider's office.Symptoms (please be specific): severe back pain  How long has patient had these symptoms: ongoing  List of preferred pharmacies on file (remove unneeded): [unfilled]If different, enter pharmacy into here including location and phone number: [facility] [address], [place] - [address] Method of Contact: Phone CallPatient's Preferred Phone Number on File: [phone] Best Call Back Number, if different:Additional Information: PT states she seen Dr. Adorno on 02/19. She wasn't able to get the injection. Pt states that she was also told that if she was having any problems to follow up w FELIBERTO Mayer. Pt states that she is in severe pain, and she is wanting to come in sooner for appt or if something can be sent in for pain. States that tylenol 3 is not working. Pt states she also went in to the ED, and medication has worn off.

## 2025-02-27 ENCOUNTER — OFFICE VISIT (OUTPATIENT)
Dept: PAIN MEDICINE | Facility: CLINIC | Age: 65
End: 2025-02-27
Payer: MEDICARE

## 2025-02-27 VITALS
DIASTOLIC BLOOD PRESSURE: 68 MMHG | HEIGHT: 64 IN | RESPIRATION RATE: 18 BRPM | HEART RATE: 74 BPM | BODY MASS INDEX: 29.02 KG/M2 | WEIGHT: 170 LBS | SYSTOLIC BLOOD PRESSURE: 159 MMHG

## 2025-02-27 DIAGNOSIS — M54.16 LUMBAR RADICULOPATHY, CHRONIC: Primary | Chronic | ICD-10-CM

## 2025-02-27 DIAGNOSIS — Z79.899 ENCOUNTER FOR LONG-TERM (CURRENT) USE OF MEDICATIONS: ICD-10-CM

## 2025-02-27 LAB
CTP QC/QA: YES
POC (AMP) AMPHETAMINE: NEGATIVE
POC (BAR) BARBITURATES: NEGATIVE
POC (BUP) BUPRENORPHINE: NEGATIVE
POC (BZO) BENZODIAZEPINES: NEGATIVE
POC (COC) COCAINE: NEGATIVE
POC (MDMA) METHYLENEDIOXYMETHAMPHETAMINE 3,4: NEGATIVE
POC (MET) METHAMPHETAMINE: NEGATIVE
POC (MOP) OPIATES: ABNORMAL
POC (MTD) METHADONE: NEGATIVE
POC (OXY) OXYCODONE: NEGATIVE
POC (PCP) PHENCYCLIDINE: NEGATIVE
POC (TCA) TRICYCLIC ANTIDEPRESSANTS: NEGATIVE
POC TEMPERATURE (URINE): 90
POC THC: NEGATIVE

## 2025-02-27 PROCEDURE — 99213 OFFICE O/P EST LOW 20 MIN: CPT | Mod: S$PBB,,, | Performed by: PHYSICIAN ASSISTANT

## 2025-02-27 PROCEDURE — 80305 DRUG TEST PRSMV DIR OPT OBS: CPT | Mod: PBBFAC | Performed by: PHYSICIAN ASSISTANT

## 2025-02-27 PROCEDURE — 1159F MED LIST DOCD IN RCRD: CPT | Mod: CPTII,,, | Performed by: PHYSICIAN ASSISTANT

## 2025-02-27 PROCEDURE — 3288F FALL RISK ASSESSMENT DOCD: CPT | Mod: CPTII,,, | Performed by: PHYSICIAN ASSISTANT

## 2025-02-27 PROCEDURE — 3008F BODY MASS INDEX DOCD: CPT | Mod: CPTII,,, | Performed by: PHYSICIAN ASSISTANT

## 2025-02-27 PROCEDURE — 1125F AMNT PAIN NOTED PAIN PRSNT: CPT | Mod: CPTII,,, | Performed by: PHYSICIAN ASSISTANT

## 2025-02-27 PROCEDURE — 3078F DIAST BP <80 MM HG: CPT | Mod: CPTII,,, | Performed by: PHYSICIAN ASSISTANT

## 2025-02-27 PROCEDURE — 99214 OFFICE O/P EST MOD 30 MIN: CPT | Mod: PBBFAC | Performed by: PHYSICIAN ASSISTANT

## 2025-02-27 PROCEDURE — 3077F SYST BP >= 140 MM HG: CPT | Mod: CPTII,,, | Performed by: PHYSICIAN ASSISTANT

## 2025-02-27 PROCEDURE — 1101F PT FALLS ASSESS-DOCD LE1/YR: CPT | Mod: CPTII,,, | Performed by: PHYSICIAN ASSISTANT

## 2025-02-27 PROCEDURE — 99999 PR PBB SHADOW E&M-EST. PATIENT-LVL IV: CPT | Mod: PBBFAC,,, | Performed by: PHYSICIAN ASSISTANT

## 2025-02-27 PROCEDURE — 99999PBSHW POCT URINE DRUG SCREEN PRESUMP: Mod: PBBFAC,,,

## 2025-02-27 RX ORDER — ACETAMINOPHEN AND CODEINE PHOSPHATE 300; 30 MG/1; MG/1
1 TABLET ORAL 2 TIMES DAILY PRN
Qty: 60 TABLET | Refills: 1 | Status: CANCELLED | OUTPATIENT
Start: 2025-02-27 | End: 2025-04-28

## 2025-02-27 NOTE — PROGRESS NOTES
Subjective:         Patient ID: Nadia Salas is a 64 y.o. female.    Chief Complaint: Low-back Pain (Patient is having low back pain that radiates into right leg and mid back after working in the yard over the weekend.)      Follow-up  This is a chronic problem. The current episode started more than 1 year ago. The problem occurs daily. The problem has been unchanged. Associated symptoms include arthralgias. Pertinent negatives include no anorexia, change in bowel habit, chest pain, chills, coughing, diaphoresis, fever, rash, sore throat, swollen glands, urinary symptoms, vertigo or vomiting.     Review of Systems   Constitutional:  Negative for activity change, appetite change, chills, diaphoresis, fever and unexpected weight change.   HENT:  Negative for drooling, ear discharge, ear pain, facial swelling, nosebleeds, sore throat, trouble swallowing, voice change and goiter.    Eyes:  Negative for photophobia, pain, discharge, redness and visual disturbance.   Respiratory:  Negative for apnea, cough, choking, chest tightness, shortness of breath, wheezing and stridor.    Cardiovascular:  Negative for chest pain, palpitations and leg swelling.   Gastrointestinal:  Negative for abdominal distention, anorexia, change in bowel habit, diarrhea, rectal pain, vomiting and fecal incontinence.   Endocrine: Negative for cold intolerance, heat intolerance, polydipsia, polyphagia and polyuria.   Genitourinary:  Negative for bladder incontinence, dysuria, flank pain, frequency and hot flashes.   Musculoskeletal:  Positive for arthralgias, back pain and leg pain.   Integumentary:  Negative for color change, pallor and rash.   Allergic/Immunologic: Negative for immunocompromised state.   Neurological:  Negative for dizziness, vertigo, seizures, syncope, facial asymmetry, speech difficulty, light-headedness, memory loss and coordination difficulties.   Hematological:  Negative for adenopathy. Does not bruise/bleed easily.    Psychiatric/Behavioral:  Negative for agitation, behavioral problems, confusion, decreased concentration, dysphoric mood, hallucinations, self-injury and suicidal ideas. The patient is not nervous/anxious and is not hyperactive.            Past Medical History:   Diagnosis Date    Abnormal uterine bleeding (AUB) 04/28/2021    Elevated serum creatinine 07/27/2021    Rechecked at 2.1 following completion of treatment for Hep C.    Hepatitis C     Liver transplant recipient 04/06/2022    Last Assessment & Plan:   Formatting of this note might be different from the original.  OLT 4/5/22  DCD  CIT 8:49  WIT 34 min   Duct to duct  chronic HCV now s/p treatment in 2021 with SVR  With decompensated cirrhosis MELD 26  LFTs trending down       Past Surgical History:   Procedure Laterality Date    CHOLECYSTECTOMY      EPIDURAL STEROID INJECTION INTO LUMBAR SPINE Bilateral 11/12/2024    Procedure: L5-S1 COLLEEN;  Surgeon: Krista Adorno MD;  Location: [facility];  Service: Pain Management;  Laterality: Bilateral;    HERNIA REPAIR      HYSTERECTOMY      LIVER TRANSPLANT      ROBOT-ASSISTED LAPAROSCOPIC HYSTERECTOMY N/A 04/29/2021    Procedure: ROBOTIC HYSTERECTOMY;  Surgeon: Swapna Sow DO;  Location: [facility] OR;  Service: OB/GYN;  Laterality: N/A;    ROBOT-ASSISTED LAPAROSCOPIC SALPINGO-OOPHORECTOMY Bilateral 04/29/2021    Procedure: ROBOTIC SALPINGO-OOPHORECTOMY;  Surgeon: Swapna Sow DO;  Location: [facility] OR;  Service: OB/GYN;  Laterality: Bilateral;    TONSILLECTOMY       Social History     Socioeconomic History    Marital status: Single   Tobacco Use    Smoking status: Never    Smokeless tobacco: Never   Substance and Sexual Activity    Alcohol use: Not Currently    Drug use: Never    Sexual activity: Not Currently     Social Drivers of Health     Financial Resource Strain: Low Risk  (4/23/2024)    Overall Financial Resource Strain (CARDIA)     Difficulty of Paying Living Expenses: Not very  "hard   Food Insecurity: No Food Insecurity (4/23/2024)    Hunger Vital Sign     Worried About Running Out of Food in the Last Year: Never true     Ran Out of Food in the Last Year: Never true   Transportation Needs: No Transportation Needs (4/23/2024)    PRAPARE - Transportation     Lack of Transportation (Medical): No     Lack of Transportation (Non-Medical): No   Physical Activity: Insufficiently Active (4/23/2024)    Exercise Vital Sign     Days of Exercise per Week: 4 days     Minutes of Exercise per Session: 30 min   Stress: No Stress Concern Present (4/23/2024)    [employer] - Occupational Stress Questionnaire     Feeling of Stress : Not at all   Housing Stability: Unknown (4/23/2024)    Housing Stability Vital Sign     Unable to Pay for Housing in the Last Year: No     Family History   Problem Relation Name Age of Onset    Heart disease Father Father     No Known Problems Mother       Review of patient's allergies indicates:  No Known Allergies     Objective:  Vitals:    02/27/25 1256 02/27/25 1257   BP: (!) 159/68    Pulse: 74    Resp: 18    Weight: 77.1 kg (170 lb)    Height: 5' 4" (1.626 m)    PainSc: 10-Worst pain ever 10-Worst pain ever   PainLoc: Back            Physical Exam  Vitals and nursing note reviewed. Exam conducted with a chaperone present.   Constitutional:       General: She is awake. She is not in acute distress.     Appearance: Normal appearance. She is not ill-appearing, toxic-appearing or diaphoretic.   HENT:      Head: Normocephalic and atraumatic.      Nose: Nose normal.      Mouth/Throat:      Mouth: Mucous membranes are moist.      Pharynx: Oropharynx is clear.   Eyes:      Conjunctiva/sclera: Conjunctivae normal.      Pupils: Pupils are equal, round, and reactive to light.   Cardiovascular:      Rate and Rhythm: Normal rate.   Pulmonary:      Effort: Pulmonary effort is normal. No respiratory distress.   Abdominal:      Palpations: Abdomen is soft.      " Tenderness: There is no guarding.   Musculoskeletal:         General: Normal range of motion.      Cervical back: Normal range of motion and neck supple. No rigidity.   Skin:     General: Skin is warm and dry.      Coloration: Skin is not jaundiced or pale.   Neurological:      General: No focal deficit present.      Mental Status: She is alert and oriented to person, place, and time. Mental status is at baseline.      Cranial Nerves: No cranial nerve deficit (II-XII).   Psychiatric:         Mood and Affect: Mood normal.         Behavior: Behavior normal. Behavior is cooperative.         Thought Content: Thought content normal.           FL Fluoro for Pain Management  See OP Notes for results.     IMPRESSION: See OP Notes for results.     This procedure was auto-finalized by: Virtual Radiologist       Lab Visit on 01/31/2025   Component Date Value Ref Range Status    Tacrolimus 01/31/2025 8.0  5.0 - 15.0 ng/mL Final    Phosphorus 01/31/2025 4.0  2.3 - 4.7 mg/dL Final    Magnesium 01/31/2025 1.7  1.6 - 2.6 mg/dL Final    GGT 01/31/2025 14  9 - 36 U/L Final    Sodium 01/31/2025 131 (L)  136 - 145 mmol/L Final    Potassium 01/31/2025 5.3 (H)  3.5 - 5.1 mmol/L Final    Chloride 01/31/2025 100  98 - 107 mmol/L Final    CO2 01/31/2025 22 (L)  23 - 31 mmol/L Final    Anion Gap 01/31/2025 14  7 - 16 mmol/L Final    Glucose 01/31/2025 105  82 - 115 mg/dL Final    BUN 01/31/2025 18  10 - 20 mg/dL Final    Creatinine 01/31/2025 1.24 (H)  0.55 - 1.02 mg/dL Final    BUN/Creatinine Ratio 01/31/2025 15  6 - 20 Final    Calcium 01/31/2025 9.0  8.4 - 10.2 mg/dL Final    Total Protein 01/31/2025 7.2  5.8 - 7.6 g/dL Final    Albumin 01/31/2025 3.8  3.4 - 4.8 g/dL Final    Globulin 01/31/2025 3.4  2.0 - 4.0 g/dL Final    A/G Ratio 01/31/2025 1.1   Final    Bilirubin, Total 01/31/2025 0.4  <=1.5 mg/dL Final    Alk Phos 01/31/2025 98  40 - 150 U/L Final    ALT 01/31/2025 8  <=55 U/L Final    AST 01/31/2025 27  5 - 34 U/L Final    eGFR  01/31/2025 48 (L)  >=60 mL/min/1.73m2 Final    Bilirubin, Direct 01/31/2025 0.1  <0.5 mg/dL Final    WBC 01/31/2025 4.08 (L)  4.50 - 11.00 K/uL Final    RBC 01/31/2025 4.20  4.20 - 5.40 M/uL Final    Hemoglobin 01/31/2025 12.3  12.0 - 16.0 g/dL Final    Hematocrit 01/31/2025 38.7  38.0 - 47.0 % Final    MCV 01/31/2025 92.1  80.0 - 96.0 fL Final    MCH 01/31/2025 29.3  27.0 - 31.0 pg Final    MCHC 01/31/2025 31.8 (L)  32.0 - 36.0 g/dL Final    RDW 01/31/2025 12.8  11.5 - 14.5 % Final    Platelet Count 01/31/2025 126 (L)  150 - 400 K/uL Final    MPV 01/31/2025 10.5  9.4 - 12.4 fL Final    Neutrophils % 01/31/2025 65.9 (H)  53.0 - 65.0 % Final    Lymphocytes % 01/31/2025 23.3 (L)  27.0 - 41.0 % Final    Monocytes % 01/31/2025 7.6 (H)  2.0 - 6.0 % Final    Eosinophils % 01/31/2025 2.2  1.0 - 4.0 % Final    Basophils % 01/31/2025 0.5  0.0 - 1.0 % Final    Immature Granulocytes % 01/31/2025 0.5 (H)  0.0 - 0.4 % Final    nRBC, Auto 01/31/2025 0.0  <=0.0 % Final    Neutrophils, Abs 01/31/2025 2.69  1.80 - 7.70 K/uL Final    Lymphocytes, Absolute 01/31/2025 0.95 (L)  1.00 - 4.80 K/uL Final    Monocytes, Absolute 01/31/2025 0.31  0.00 - 0.80 K/uL Final    Eosinophils, Absolute 01/31/2025 0.09  0.00 - 0.50 K/uL Final    Basophils, Absolute 01/31/2025 0.02  0.00 - 0.20 K/uL Final    Immature Granulocytes, Absolute 01/31/2025 0.02  0.00 - 0.04 K/uL Final    nRBC, Absolute 01/31/2025 0.00  <=0.00 x10e3/uL Final    Diff Type 01/31/2025 Auto   Final   Lab Visit on 11/08/2024   Component Date Value Ref Range Status    Phosphorus 11/08/2024 3.5  2.5 - 4.5 mg/dL Final    Magnesium 11/08/2024 1.9  1.7 - 2.3 mg/dL Final    GGT 11/08/2024 11  5 - 55 U/L Final    Sodium 11/08/2024 130 (L)  136 - 145 mmol/L Final    Potassium 11/08/2024 4.9  3.5 - 5.1 mmol/L Final    Chloride 11/08/2024 99  98 - 107 mmol/L Final    CO2 11/08/2024 26  21 - 32 mmol/L Final    Anion Gap 11/08/2024 10  7 - 16 mmol/L Final    Glucose 11/08/2024 101  74 - 106  mg/dL Final    BUN 11/08/2024 16  7 - 18 mg/dL Final    Creatinine 11/08/2024 1.29 (H)  0.55 - 1.02 mg/dL Final    BUN/Creatinine Ratio 11/08/2024 12  6 - 20 Final    Calcium 11/08/2024 8.9  8.5 - 10.1 mg/dL Final    Total Protein 11/08/2024 7.4  6.4 - 8.2 g/dL Final    Albumin 11/08/2024 3.5  3.5 - 5.0 g/dL Final    Globulin 11/08/2024 3.9  2.0 - 4.0 g/dL Final    A/G Ratio 11/08/2024 0.9   Final    Bilirubin, Total 11/08/2024 0.5  >0.0 - 1.2 mg/dL Final    Alk Phos 11/08/2024 104  55 - 142 U/L Final    ALT 11/08/2024 13  13 - 56 U/L Final    AST 11/08/2024 14 (L)  15 - 37 U/L Final    eGFR 11/08/2024 46 (L)  >=60 mL/min/1.73m2 Final    Bilirubin, Direct 11/08/2024 <0.1  0.0 - 0.2 mg/dL Final    WBC 11/08/2024 2.85 (L)  4.50 - 11.00 K/uL Final    RBC 11/08/2024 4.35  4.20 - 5.40 M/uL Final    Hemoglobin 11/08/2024 12.8  12.0 - 16.0 g/dL Final    Hematocrit 11/08/2024 39.0  38.0 - 47.0 % Final    MCV 11/08/2024 89.7  80.0 - 96.0 fL Final    MCH 11/08/2024 29.4  27.0 - 31.0 pg Final    MCHC 11/08/2024 32.8  32.0 - 36.0 g/dL Final    RDW 11/08/2024 13.0  11.5 - 14.5 % Final    Platelet Count 11/08/2024 117 (L)  150 - 400 K/uL Final    MPV 11/08/2024 10.5  9.4 - 12.4 fL Final    Neutrophils % 11/08/2024 59.5  53.0 - 65.0 % Final    Lymphocytes % 11/08/2024 30.9  27.0 - 41.0 % Final    Monocytes % 11/08/2024 6.0  2.0 - 6.0 % Final    Eosinophils % 11/08/2024 2.5  1.0 - 4.0 % Final    Basophils % 11/08/2024 0.7  0.0 - 1.0 % Final    Immature Granulocytes % 11/08/2024 0.4  0.0 - 0.4 % Final    nRBC, Auto 11/08/2024 0.0  <=0.0 % Final    Neutrophils, Abs 11/08/2024 1.70 (L)  1.80 - 7.70 K/uL Final    Lymphocytes, Absolute 11/08/2024 0.88 (L)  1.00 - 4.80 K/uL Final    Monocytes, Absolute 11/08/2024 0.17  0.00 - 0.80 K/uL Final    Eosinophils, Absolute 11/08/2024 0.07  0.00 - 0.50 K/uL Final    Basophils, Absolute 11/08/2024 0.02  0.00 - 0.20 K/uL Final    Immature Granulocytes, Absolute 11/08/2024 0.01  0.00 - 0.04 K/uL  Final    nRBC, Absolute 11/08/2024 0.00  <=0.00 x10e3/uL Final    Diff Type 11/08/2024 Auto   Final    Tacrolimus 11/08/2024 5.0  5.0-15.0 (Trough) ng/mL Final   Office Visit on 10/16/2024   Component Date Value Ref Range Status    POC Amphetamines 10/16/2024 Negative  Negative, Inconclusive Final    POC Barbiturates 10/16/2024 Negative  Negative, Inconclusive Final    POC Benzodiazepines 10/16/2024 Negative  Negative, Inconclusive Final    POC Cocaine 10/16/2024 Negative  Negative, Inconclusive Final    POC THC 10/16/2024 Negative  Negative, Inconclusive Final    POC Methadone 10/16/2024 Negative  Negative, Inconclusive Final    POC Methamphetamine 10/16/2024 Negative  Negative, Inconclusive Final    POC Opiates 10/16/2024 Negative  Negative, Inconclusive Final    POC Oxycodone 10/16/2024 Negative  Negative, Inconclusive Final    POC Phencyclidine 10/16/2024 Negative  Negative, Inconclusive Final    POC Methylenedioxymethamphetamine * 10/16/2024 Negative  Negative, Inconclusive Final    POC Tricyclic Antidepressants 10/16/2024 Negative  Negative, Inconclusive Final    POC Buprenorphine 10/16/2024 Negative   Final     Acceptable 10/16/2024 Yes   Final    POC Temperature (Urine) 10/16/2024 94   Final    Creatinine, U 10/16/2024 15.3  mg/dL Final    Specific Gravity 10/16/2024 1.005   Final    pH 10/16/2024 6.0   Final    Oxidants 10/16/2024 Negative  Cutoff: 200 mg/L Final    Comment 10/16/2024 Normal   Final    Codeine 10/16/2024 Not Detected  Cutoff: 25 ng/mL Final    C6BG 10/16/2024 Not Detected  Cutoff: 100 ng/mL Final    Morphine 10/16/2024 Not Detected  Cutoff: 25 ng/mL Final    M6BG 10/16/2024 Not Detected  Cutoff: 100 ng/mL Final    6 Monoacetylmorphine 10/16/2024 Not Detected  Cutoff: 25 ng/mL Final    Hydrocodone 10/16/2024 Not Detected  Cutoff: 25 ng/mL Final    Norhydrocodone 10/16/2024 Not Detected  Cutoff: 25 ng/mL Final    Dihydrocodeine 10/16/2024 Not Detected  Cutoff: 25 ng/mL Final     Hydromorphone 10/16/2024 Not Detected  Cutoff: 25 ng/mL Final    Hydromorphone-3-beta-glucuronide 10/16/2024 Not Detected  Cutoff: 100 ng/mL Final    Oxycodone 10/16/2024 Not Detected  Cutoff: 25 ng/mL Final    Noroxycodone 10/16/2024 Not Detected  Cutoff: 25 ng/mL Final    Oxymorphone 10/16/2024 Not Detected  Cutoff: 25 ng/mL Final    Oxymorphone-3-beta-glucuronid 10/16/2024 Not Detected  Cutoff: 100 ng/mL Final    Noroxymorphone 10/16/2024 Not Detected  Cutoff: 25 ng/mL Final    Fentanyl 10/16/2024 Not Detected  Cutoff: 2 ng/mL Final    Norfentanyl 10/16/2024 Not Detected  Cutoff: 2 ng/mL Final    Meperidine 10/16/2024 Not Detected  Cutoff: 25 ng/mL Final    Normeperidine 10/16/2024 Not Detected  Cutoff: 25 ng/mL Final    Naloxone 10/16/2024 Not Detected  Cutoff: 25 ng/mL Final    Naloxone-3-beta-glucuronide 10/16/2024 Not Detected  Cutoff: 100 ng/mL Final    Methadone 10/16/2024 Not Detected  Cutoff: 25 ng/mL Final    EDDP 10/16/2024 Not Detected  Cutoff: 25 ng/mL Final    Propoxyphene 10/16/2024 Not Detected  Cutoff: 25 ng/mL Final    Norpropoxyphene 10/16/2024 Not Detected  Cutoff: 25 ng/mL Final    Tramadol 10/16/2024 Not Detected  Cutoff: 25 ng/mL Final    O-desmethyltramadol 10/16/2024 Not Detected  Cutoff: 25 ng/mL Final    Tapentadol 10/16/2024 Not Detected  Cutoff: 25 ng/mL Final    N-Desmethyltapentadol 10/16/2024 Not Detected  Cutoff: 50 ng/mL Final    M TAPENTADOL-BETA-GLUCURONIDE 10/16/2024 Not Detected  Cutoff: 100 ng/mL Final    Buprenorphine 10/16/2024 Not Detected  Cutoff: 5 ng/mL Final    Norbuprenorphine 10/16/2024 Not Detected  Cutoff: 5 ng/mL Final    Norbuprenorphine glucuronide 10/16/2024 Not Detected  Cutoff: 20 ng/mL Final    Opioid Interpretation 10/16/2024 SEE COMMENTS   Final    Cocaine 10/16/2024 Negative  Cutoff: 150 ng/mL Final    Tetrahydrocannabinol 10/16/2024 Negative  Cutoff: 50 ng/mL Final    Alprazolam 10/16/2024 Not Detected  Cutoff: 10 ng/mL Final    Alpha-Hydroxyalprazolam  10/16/2024 Not Detected  Cutoff: 10 ng/mL Final    Alpha-Hydroxyalprazolam Glucuronide 10/16/2024 Not Detected  Cutoff: 50 ng/mL Final    Chlordiazepoxide 10/16/2024 Not Detected  Cutoff: 10 ng/mL Final    Clobazam 10/16/2024 Not Detected  Cutoff: 10 ng/mL Final    N-Desmethylclobazam 10/16/2024 Not Detected  Cutoff: 200 ng/mL Final    Clonazepam 10/16/2024 Not Detected  Cutoff: 10 ng/mL Final    7-aminoclonazepam 10/16/2024 Not Detected  Cutoff: 10 ng/mL Final    Diazepam 10/16/2024 Not Detected  Cutoff: 10 ng/mL Final    Nordiazepam 10/16/2024 Not Detected  Cutoff: 10 ng/mL Final    Flunitrazepam 10/16/2024 Not Detected  Cutoff: 10 ng/mL Final    7-aminoflunitrazepam 10/16/2024 Not Detected  Cutoff: 10 ng/mL Final    Flurazepam 10/16/2024 Not Detected  Cutoff: 10 ng/mL Final    2-Hydroxy Ethyl Flurazepam 10/16/2024 Not Detected  Cutoff: 10 ng/mL Final    Lorazepam 10/16/2024 Not Detected  Cutoff: 10 ng/mL Final    Lorazepam Glucuronide 10/16/2024 Not Detected  Cutoff: 50 ng/mL Final    Midazolam 10/16/2024 Not Detected  Cutoff: 10 ng/mL Final    Alpha-Hydroxy Midazolam 10/16/2024 Not Detected  Cutoff: 10 ng/mL Final    Oxazepam 10/16/2024 Not Detected  Cutoff: 10 ng/mL Final    Oxazepam Glucuronide 10/16/2024 Not Detected  Cutoff: 50 ng/mL Final    Prazepam 10/16/2024 Not Detected  Cutoff: 10 ng/mL Final    Temazepam 10/16/2024 Not Detected  Cutoff: 10 ng/mL Final    Temazepam Glucuronide 10/16/2024 Not Detected  Cutoff: 50 ng/mL Final    Triazolam 10/16/2024 Not Detected  Cutoff: 10 ng/mL Final    Alpha-Hydroxy Triazolam 10/16/2024 Not Detected  Cutoff: 10 ng/mL Final    Zolpidem 10/16/2024 Not Detected  Cutoff: 10 ng/mL Final    Zolpidem Phenyl-4-Carboxylic acid 10/16/2024 Not Detected  Cutoff: 10 ng/mL Final    Benzodiazepine Interpretation 10/16/2024 SEE COMMENTS   Final    List Patient's Current Medications 10/16/2024 na   Final    Methamphetamine 10/16/2024 Not Detected  Cutoff: 100 ng/mL Final     Amphetamine 10/16/2024 Not Detected  Cutoff: 100 ng/mL Final    3,4-methylenedioxymethamphetamine * 10/16/2024 Not Detected  Cutoff: 100 ng/mL Final    3,0-ltwfxxecugwcqq-Z-ethylamphetam* 10/16/2024 Not Detected  Cutoff: 100 ng/mL Final    3,4-methylenedioxyamphetamine (MDA) 10/16/2024 Not Detected  Cutoff: 100 ng/mL Final    Ephedrine 10/16/2024 Not Detected  Cutoff: 100 ng/mL Final    Pseudoephedrine 10/16/2024 Not Detected  Cutoff: 100 ng/mL Final    Phentermine 10/16/2024 Not Detected  Cutoff: 100 ng/mL Final    Phencyclidine (PCP) 10/16/2024 Not Detected  Cutoff: 20 ng/mL Final    Methylphenidate 10/16/2024 Not Detected  Cutoff: 20 ng/mL Final    Ritalinic acid 10/16/2024 Not Detected  Cutoff: 100 ng/mL Final    Stimulant Interpretation 10/16/2024 SEE COMMENTS   Final    Barbiturates 10/16/2024 Negative  Cutoff: 200 ng/mL Final   Office Visit on 09/29/2024   Component Date Value Ref Range Status    POC Rapid COVID 09/29/2024 Positive (A)  Negative Final     Acceptable 09/29/2024 Yes   Final    POC Molecular Influenza A Ag 09/29/2024 Negative  Negative Final    POC Molecular Influenza B Ag 09/29/2024 Negative  Negative Final     Acceptable 09/29/2024 Yes   Final   Lab Visit on 09/13/2024   Component Date Value Ref Range Status    Hemoglobin A1C 09/13/2024 4.8  4.5 - 6.6 % Final    Estimated Average Glucose 09/13/2024 91  mg/dL Final    Phosphorus 09/13/2024 3.8  2.5 - 4.5 mg/dL Final    Magnesium 09/13/2024 1.8  1.7 - 2.3 mg/dL Final    GGT 09/13/2024 15  5 - 55 U/L Final    Sodium 09/13/2024 129 (L)  136 - 145 mmol/L Final    Potassium 09/13/2024 4.9  3.5 - 5.1 mmol/L Final    Chloride 09/13/2024 98  98 - 107 mmol/L Final    CO2 09/13/2024 28  21 - 32 mmol/L Final    Anion Gap 09/13/2024 8  7 - 16 mmol/L Final    Glucose 09/13/2024 113 (H)  74 - 106 mg/dL Final    BUN 09/13/2024 18  7 - 18 mg/dL Final    Creatinine 09/13/2024 1.31 (H)  0.55 - 1.02 mg/dL Final    BUN/Creatinine  Ratio 09/13/2024 14  6 - 20 Final    Calcium 09/13/2024 8.8  8.5 - 10.1 mg/dL Final    Total Protein 09/13/2024 7.3  6.4 - 8.2 g/dL Final    Albumin 09/13/2024 3.7  3.5 - 5.0 g/dL Final    Globulin 09/13/2024 3.6  2.0 - 4.0 g/dL Final    A/G Ratio 09/13/2024 1.0   Final    Bilirubin, Total 09/13/2024 0.4  >0.0 - 1.2 mg/dL Final    Alk Phos 09/13/2024 112  55 - 142 U/L Final    ALT 09/13/2024 17  13 - 56 U/L Final    AST 09/13/2024 16  15 - 37 U/L Final    eGFR 09/13/2024 45 (L)  >=60 mL/min/1.73m2 Final    Bilirubin, Direct 09/13/2024 0.1  0.0 - 0.2 mg/dL Final    WBC 09/13/2024 3.04 (L)  4.50 - 11.00 K/uL Final    RBC 09/13/2024 3.95 (L)  4.20 - 5.40 M/uL Final    Hemoglobin 09/13/2024 11.5 (L)  12.0 - 16.0 g/dL Final    Hematocrit 09/13/2024 36.2 (L)  38.0 - 47.0 % Final    MCV 09/13/2024 91.6  80.0 - 96.0 fL Final    MCH 09/13/2024 29.1  27.0 - 31.0 pg Final    MCHC 09/13/2024 31.8 (L)  32.0 - 36.0 g/dL Final    RDW 09/13/2024 13.0  11.5 - 14.5 % Final    Platelet Count 09/13/2024 124 (L)  150 - 400 K/uL Final    MPV 09/13/2024 9.9  9.4 - 12.4 fL Final    Neutrophils % 09/13/2024 62.2  53.0 - 65.0 % Final    Lymphocytes % 09/13/2024 27.0  27.0 - 41.0 % Final    Monocytes % 09/13/2024 7.2 (H)  2.0 - 6.0 % Final    Eosinophils % 09/13/2024 2.3  1.0 - 4.0 % Final    Basophils % 09/13/2024 1.0  0.0 - 1.0 % Final    Immature Granulocytes % 09/13/2024 0.3  0.0 - 0.4 % Final    nRBC, Auto 09/13/2024 0.0  <=0.0 % Final    Neutrophils, Abs 09/13/2024 1.89  1.80 - 7.70 K/uL Final    Lymphocytes, Absolute 09/13/2024 0.82 (L)  1.00 - 4.80 K/uL Final    Monocytes, Absolute 09/13/2024 0.22  0.00 - 0.80 K/uL Final    Eosinophils, Absolute 09/13/2024 0.07  0.00 - 0.50 K/uL Final    Basophils, Absolute 09/13/2024 0.03  0.00 - 0.20 K/uL Final    Immature Granulocytes, Absolute 09/13/2024 0.01  0.00 - 0.04 K/uL Final    nRBC, Absolute 09/13/2024 0.00  <=0.00 x10e3/uL Final    Diff Type 09/13/2024 Auto   Final    Tacrolimus  09/13/2024 2.7 (L)  5.0-15.0 (Trough) ng/mL Final         Orders Placed This Encounter   Procedures    POCT Urine Drug Screen Presump     Interpretive Information:     Negative:  No drug detected at the cut off level.   Positive:  This result represents presumptive positive for the   tested drug, other substances may yield a positive response other   than the analyte of interest. This result should be utilized for   diagnostic purpose only. Confirmation testing will be performed upon physician request only.          Requested Prescriptions      No prescriptions requested or ordered in this encounter       Assessment:     1. Lumbar radiculopathy, chronic    2. Encounter for long-term (current) use of medications               Plan:    Not using narcotics from our office November 26, 2024    History liver transplant  Limit NSAIDs/muscle relaxers       Follows spine surgery [facility]     She had lumbar L5/S1 COLLEEN # 1 November 12, 2024  Patient states she had 90% relief after procedure  Procedure did help improve her level function     She states she was at home doing some chores started having right lower back right leg pain numbness and tingling sharp pain was     Was treated emergency room was given morphine steroid injection    She is requesting options     MRI of the lumbar from [facility] September 06, /2024 revealed L3-4 left foraminal stenosis, L4-5 bilateral stenosis and L5-S1 central disc protrusion and severe left foraminal stenosis      Due to patient's history of liver transplant will need to limit NSAIDs/muscle relaxer    She will discuss with her transplant specialist appropriate treatment for acute    She would like to continue with conservative management     Continue home exercise program      Follow-up as needed, patient request     Dr. Adorno November 2025         Bring original prescription medication bottles/container/box with labels to each visit

## 2025-05-19 ENCOUNTER — OFFICE VISIT (OUTPATIENT)
Dept: PAIN MEDICINE | Facility: CLINIC | Age: 65
End: 2025-05-19
Payer: MEDICARE

## 2025-05-19 VITALS
RESPIRATION RATE: 18 BRPM | DIASTOLIC BLOOD PRESSURE: 85 MMHG | HEART RATE: 77 BPM | HEIGHT: 64 IN | BODY MASS INDEX: 28.34 KG/M2 | SYSTOLIC BLOOD PRESSURE: 157 MMHG | WEIGHT: 166 LBS

## 2025-05-19 DIAGNOSIS — M54.17 LUMBOSACRAL RADICULOPATHY: Primary | Chronic | ICD-10-CM

## 2025-05-19 DIAGNOSIS — Z94.4 LIVER TRANSPLANT RECIPIENT: Chronic | ICD-10-CM

## 2025-05-19 DIAGNOSIS — G89.29 CHRONIC BILATERAL THORACIC BACK PAIN: Chronic | ICD-10-CM

## 2025-05-19 DIAGNOSIS — M54.6 CHRONIC BILATERAL THORACIC BACK PAIN: Chronic | ICD-10-CM

## 2025-05-19 PROCEDURE — 3077F SYST BP >= 140 MM HG: CPT | Mod: CPTII,,, | Performed by: PAIN MEDICINE

## 2025-05-19 PROCEDURE — 99214 OFFICE O/P EST MOD 30 MIN: CPT | Mod: S$PBB,,, | Performed by: PAIN MEDICINE

## 2025-05-19 PROCEDURE — 1159F MED LIST DOCD IN RCRD: CPT | Mod: CPTII,,, | Performed by: PAIN MEDICINE

## 2025-05-19 PROCEDURE — 99215 OFFICE O/P EST HI 40 MIN: CPT | Mod: PBBFAC | Performed by: PAIN MEDICINE

## 2025-05-19 PROCEDURE — 99999 PR PBB SHADOW E&M-EST. PATIENT-LVL V: CPT | Mod: PBBFAC,,, | Performed by: PAIN MEDICINE

## 2025-05-19 PROCEDURE — 3079F DIAST BP 80-89 MM HG: CPT | Mod: CPTII,,, | Performed by: PAIN MEDICINE

## 2025-05-19 PROCEDURE — 1125F AMNT PAIN NOTED PAIN PRSNT: CPT | Mod: CPTII,,, | Performed by: PAIN MEDICINE

## 2025-05-19 PROCEDURE — 3288F FALL RISK ASSESSMENT DOCD: CPT | Mod: CPTII,,, | Performed by: PAIN MEDICINE

## 2025-05-19 PROCEDURE — 3008F BODY MASS INDEX DOCD: CPT | Mod: CPTII,,, | Performed by: PAIN MEDICINE

## 2025-05-19 PROCEDURE — 1101F PT FALLS ASSESS-DOCD LE1/YR: CPT | Mod: CPTII,,, | Performed by: PAIN MEDICINE

## 2025-05-19 NOTE — H&P (VIEW-ONLY)
Krista Adorno M.D.  [facility]  [address] [place] [address], [place] [id number]    Established    Chief complaint: Low-back Pain (Patient is having low back pain.)       HPI:   History of Present Illness    CHIEF COMPLAINT:  Lower back and leg pain with worsening symptoms extending to shoulder blades and neck.    HPI:  Patient presents for follow-up of lower back and leg pain, last seen on February 19th of this year. She reports worsening pain, now extending from the lower back up through the shoulder blades and to the neck. She describes occasional leg pain but more frequently numbness, stating her legs have minimal pain but experience paresthesia. Pain is aggravated by all activities recently. She denies weakness or falls.    She received COLLEEN  injections in the past, with the last one providing about 60-67% pain relief lasting for a few months. Both legs are equally affected.    She has a history of liver transplant performed 3 years ago, with no complications reported after previous injections. She expresses anxiety about procedures despite extensive medical history.    She denies urinary incontinence.    PREVIOUS TREATMENTS:  Patient previously received epidural injections, which provided approximately 60-67% pain relief lasting for a few months. She reported that the treatment worked well for a period of time.    IMAGING:  An MRI revealed disc bulges at L5 with severe foraminal stenosis on the left side. The imaging also showed stenosis at L3-4 and L4-5 levels.    SURGICAL HISTORY:  Patient underwent a liver transplant three years ago.          Review of patient's allergies indicates:  No Known Allergies    Current Medications[1]    HISTORY:  Past Medical History:   Diagnosis Date    Abnormal uterine bleeding (AUB) 04/28/2021    Elevated serum creatinine 07/27/2021    Rechecked at 2.1 following completion of treatment for Hep C.    Hepatitis C     Liver transplant recipient 04/06/2022    Last Assessment  & Plan:   Formatting of this note might be different from the original.  OLT 4/5/22  DCD  CIT 8:49  WIT 34 min   Duct to duct  chronic HCV now s/p treatment in 2021 with SVR  With decompensated cirrhosis MELD 26  LFTs trending down        Past Surgical History:   Procedure Laterality Date    CHOLECYSTECTOMY      EPIDURAL STEROID INJECTION INTO LUMBAR SPINE Bilateral 11/12/2024    Procedure: L5-S1 COLLEEN;  Surgeon: Kirsta Adorno MD;  Location: [facility] PAIN [facility];  Service: Pain Management;  Laterality: Bilateral;    HERNIA REPAIR      HYSTERECTOMY      LIVER TRANSPLANT      ROBOT-ASSISTED LAPAROSCOPIC HYSTERECTOMY N/A 04/29/2021    Procedure: ROBOTIC HYSTERECTOMY;  Surgeon: Swapna Sow DO;  Location: [facility] OR;  Service: OB/GYN;  Laterality: N/A;    ROBOT-ASSISTED LAPAROSCOPIC SALPINGO-OOPHORECTOMY Bilateral 04/29/2021    Procedure: ROBOTIC SALPINGO-OOPHORECTOMY;  Surgeon: Swapna Sow DO;  Location: [facility] OR;  Service: OB/GYN;  Laterality: Bilateral;    TONSILLECTOMY        Family History   Problem Relation Name Age of Onset    Heart disease Father Father     No Known Problems Mother        Social History: She  reports that she has never smoked. She has never used smokeless tobacco. She reports that she does not currently use alcohol. She reports that she does not use drugs.    Imaging:  FL Fluoro for Pain Management  See OP Notes for results.     IMPRESSION: See OP Notes for results.     This procedure was auto-finalized by: Virtual Radiologist       Labs:  Lab Visit on 03/06/2025   Component Date Value Ref Range Status    Sodium 03/06/2025 132 (L)  136 - 145 mmol/L Final    Potassium 03/06/2025 5.3 (H)  3.5 - 5.1 mmol/L Final    Chloride 03/06/2025 103  98 - 107 mmol/L Final    CO2 03/06/2025 25  23 - 31 mmol/L Final    Anion Gap 03/06/2025 9  7 - 16 mmol/L Final    Glucose 03/06/2025 104  82 - 115 mg/dL Final    BUN 03/06/2025 19  10 - 20 mg/dL Final    Creatinine 03/06/2025 1.21 (H)  0.55  - 1.02 mg/dL Final    BUN/Creatinine Ratio 03/06/2025 16  6 - 20 Final    Calcium 03/06/2025 9.0  8.4 - 10.2 mg/dL Final    Total Protein 03/06/2025 6.9  5.8 - 7.6 g/dL Final    Albumin 03/06/2025 3.7  3.4 - 4.8 g/dL Final    Globulin 03/06/2025 3.2  2.0 - 4.0 g/dL Final    A/G Ratio 03/06/2025 1.2   Final    Bilirubin, Total 03/06/2025 0.4  <=1.5 mg/dL Final    Alk Phos 03/06/2025 153 (H)  40 - 150 U/L Final    ALT 03/06/2025 29  <=55 U/L Final    AST 03/06/2025 19  5 - 34 U/L Final    eGFR 03/06/2025 50 (L)  >=60 mL/min/1.73m2 Final    Phosphorus 03/06/2025 3.9  2.3 - 4.7 mg/dL Final    Magnesium 03/06/2025 1.8  1.6 - 2.6 mg/dL Final    Bilirubin, Direct 03/06/2025 0.2  <0.5 mg/dL Final    Tacrolimus 03/06/2025 5.7  5.0 - 15.0 ng/mL Final    GGT 03/06/2025 166 (H)  9 - 36 U/L Final    WBC 03/06/2025 3.68 (L)  4.50 - 11.00 K/uL Final    RBC 03/06/2025 4.08 (L)  4.20 - 5.40 M/uL Final    Hemoglobin 03/06/2025 11.7 (L)  12.0 - 16.0 g/dL Final    Hematocrit 03/06/2025 37.1 (L)  38.0 - 47.0 % Final    MCV 03/06/2025 90.9  80.0 - 96.0 fL Final    MCH 03/06/2025 28.7  27.0 - 31.0 pg Final    MCHC 03/06/2025 31.5 (L)  32.0 - 36.0 g/dL Final    RDW 03/06/2025 12.6  11.5 - 14.5 % Final    Platelet Count 03/06/2025 135 (L)  150 - 400 K/uL Final    MPV 03/06/2025 10.8  9.4 - 12.4 fL Final    Neutrophils % 03/06/2025 65.5 (H)  53.0 - 65.0 % Final    Lymphocytes % 03/06/2025 23.1 (L)  27.0 - 41.0 % Final    Monocytes % 03/06/2025 8.4 (H)  2.0 - 6.0 % Final    Eosinophils % 03/06/2025 1.6  1.0 - 4.0 % Final    Basophils % 03/06/2025 1.1 (H)  0.0 - 1.0 % Final    Immature Granulocytes % 03/06/2025 0.3  0.0 - 0.4 % Final    nRBC, Auto 03/06/2025 0.0  <=0.0 % Final    Neutrophils, Abs 03/06/2025 2.41  1.80 - 7.70 K/uL Final    Lymphocytes, Absolute 03/06/2025 0.85 (L)  1.00 - 4.80 K/uL Final    Monocytes, Absolute 03/06/2025 0.31  0.00 - 0.80 K/uL Final    Eosinophils, Absolute 03/06/2025 0.06  0.00 - 0.50 K/uL Final    Basophils,  Absolute 03/06/2025 0.04  0.00 - 0.20 K/uL Final    Immature Granulocytes, Absolute 03/06/2025 0.01  0.00 - 0.04 K/uL Final    nRBC, Absolute 03/06/2025 0.00  <=0.00 x10e3/uL Final    Diff Type 03/06/2025 Auto   Final   Office Visit on 02/27/2025   Component Date Value Ref Range Status    POC Amphetamines 02/27/2025 Negative  Negative, Inconclusive Final    POC Barbiturates 02/27/2025 Negative  Negative, Inconclusive Final    POC Benzodiazepines 02/27/2025 Negative  Negative, Inconclusive Final    POC Cocaine 02/27/2025 Negative  Negative, Inconclusive Final    POC THC 02/27/2025 Negative  Negative, Inconclusive Final    POC Methadone 02/27/2025 Negative  Negative, Inconclusive Final    POC Methamphetamine 02/27/2025 Negative  Negative, Inconclusive Final    POC Opiates 02/27/2025 Presumptive Positive (A)  Negative, Inconclusive Final    POC Oxycodone 02/27/2025 Negative  Negative, Inconclusive Final    POC Phencyclidine 02/27/2025 Negative  Negative, Inconclusive Final    POC Methylenedioxymethamphetamine * 02/27/2025 Negative  Negative, Inconclusive Final    POC Tricyclic Antidepressants 02/27/2025 Negative  Negative, Inconclusive Final    POC Buprenorphine 02/27/2025 Negative   Final     Acceptable 02/27/2025 Yes   Final    POC Temperature (Urine) 02/27/2025 90   Final   Lab Visit on 02/27/2025   Component Date Value Ref Range Status    Tacrolimus 02/27/2025 4.5 (L)  5.0 - 15.0 ng/mL Final    Phosphorus 02/27/2025 3.1  2.3 - 4.7 mg/dL Final    Magnesium 02/27/2025 1.7  1.6 - 2.6 mg/dL Final    GGT 02/27/2025 442 (H)  9 - 36 U/L Final    Sodium 02/27/2025 135 (L)  136 - 145 mmol/L Final    Potassium 02/27/2025 4.8  3.5 - 5.1 mmol/L Final    Chloride 02/27/2025 104  98 - 107 mmol/L Final    CO2 02/27/2025 25  23 - 31 mmol/L Final    Anion Gap 02/27/2025 11  7 - 16 mmol/L Final    Glucose 02/27/2025 126 (H)  82 - 115 mg/dL Final    BUN 02/27/2025 26 (H)  10 - 20 mg/dL Final    Creatinine 02/27/2025  1.59 (H)  0.55 - 1.02 mg/dL Final    BUN/Creatinine Ratio 02/27/2025 16  6 - 20 Final    Calcium 02/27/2025 9.1  8.4 - 10.2 mg/dL Final    Total Protein 02/27/2025 7.0  5.8 - 7.6 g/dL Final    Albumin 02/27/2025 3.7  3.4 - 4.8 g/dL Final    Globulin 02/27/2025 3.3  2.0 - 4.0 g/dL Final    A/G Ratio 02/27/2025 1.1   Final    Bilirubin, Total 02/27/2025 0.3  <=1.5 mg/dL Final    Alk Phos 02/27/2025 322 (H)  40 - 150 U/L Final    ALT 02/27/2025 207 (H)  <=55 U/L Final    AST 02/27/2025 151 (H)  5 - 34 U/L Final    eGFR 02/27/2025 36 (L)  >=60 mL/min/1.73m2 Final    Bilirubin, Direct 02/27/2025 0.1  <0.5 mg/dL Final    WBC 02/27/2025 4.58  4.50 - 11.00 K/uL Final    RBC 02/27/2025 4.11 (L)  4.20 - 5.40 M/uL Final    Hemoglobin 02/27/2025 12.1  12.0 - 16.0 g/dL Final    Hematocrit 02/27/2025 38.1  38.0 - 47.0 % Final    MCV 02/27/2025 92.7  80.0 - 96.0 fL Final    MCH 02/27/2025 29.4  27.0 - 31.0 pg Final    MCHC 02/27/2025 31.8 (L)  32.0 - 36.0 g/dL Final    RDW 02/27/2025 12.8  11.5 - 14.5 % Final    Platelet Count 02/27/2025 123 (L)  150 - 400 K/uL Final    MPV 02/27/2025 10.9  9.4 - 12.4 fL Final    Neutrophils % 02/27/2025 71.6 (H)  53.0 - 65.0 % Final    Lymphocytes % 02/27/2025 19.0 (L)  27.0 - 41.0 % Final    Monocytes % 02/27/2025 7.6 (H)  2.0 - 6.0 % Final    Eosinophils % 02/27/2025 0.9 (L)  1.0 - 4.0 % Final    Basophils % 02/27/2025 0.7  0.0 - 1.0 % Final    Immature Granulocytes % 02/27/2025 0.2  0.0 - 0.4 % Final    nRBC, Auto 02/27/2025 0.0  <=0.0 % Final    Neutrophils, Abs 02/27/2025 3.28  1.80 - 7.70 K/uL Final    Lymphocytes, Absolute 02/27/2025 0.87 (L)  1.00 - 4.80 K/uL Final    Monocytes, Absolute 02/27/2025 0.35  0.00 - 0.80 K/uL Final    Eosinophils, Absolute 02/27/2025 0.04  0.00 - 0.50 K/uL Final    Basophils, Absolute 02/27/2025 0.03  0.00 - 0.20 K/uL Final    Immature Granulocytes, Absolute 02/27/2025 0.01  0.00 - 0.04 K/uL Final    nRBC, Absolute 02/27/2025 0.00  <=0.00 x10e3/uL Final     Diff Type 02/27/2025 Auto   Final   Lab Visit on 01/31/2025   Component Date Value Ref Range Status    Tacrolimus 01/31/2025 8.0  5.0 - 15.0 ng/mL Final    Phosphorus 01/31/2025 4.0  2.3 - 4.7 mg/dL Final    Magnesium 01/31/2025 1.7  1.6 - 2.6 mg/dL Final    GGT 01/31/2025 14  9 - 36 U/L Final    Sodium 01/31/2025 131 (L)  136 - 145 mmol/L Final    Potassium 01/31/2025 5.3 (H)  3.5 - 5.1 mmol/L Final    Chloride 01/31/2025 100  98 - 107 mmol/L Final    CO2 01/31/2025 22 (L)  23 - 31 mmol/L Final    Anion Gap 01/31/2025 14  7 - 16 mmol/L Final    Glucose 01/31/2025 105  82 - 115 mg/dL Final    BUN 01/31/2025 18  10 - 20 mg/dL Final    Creatinine 01/31/2025 1.24 (H)  0.55 - 1.02 mg/dL Final    BUN/Creatinine Ratio 01/31/2025 15  6 - 20 Final    Calcium 01/31/2025 9.0  8.4 - 10.2 mg/dL Final    Total Protein 01/31/2025 7.2  5.8 - 7.6 g/dL Final    Albumin 01/31/2025 3.8  3.4 - 4.8 g/dL Final    Globulin 01/31/2025 3.4  2.0 - 4.0 g/dL Final    A/G Ratio 01/31/2025 1.1   Final    Bilirubin, Total 01/31/2025 0.4  <=1.5 mg/dL Final    Alk Phos 01/31/2025 98  40 - 150 U/L Final    ALT 01/31/2025 8  <=55 U/L Final    AST 01/31/2025 27  5 - 34 U/L Final    eGFR 01/31/2025 48 (L)  >=60 mL/min/1.73m2 Final    Bilirubin, Direct 01/31/2025 0.1  <0.5 mg/dL Final    WBC 01/31/2025 4.08 (L)  4.50 - 11.00 K/uL Final    RBC 01/31/2025 4.20  4.20 - 5.40 M/uL Final    Hemoglobin 01/31/2025 12.3  12.0 - 16.0 g/dL Final    Hematocrit 01/31/2025 38.7  38.0 - 47.0 % Final    MCV 01/31/2025 92.1  80.0 - 96.0 fL Final    MCH 01/31/2025 29.3  27.0 - 31.0 pg Final    MCHC 01/31/2025 31.8 (L)  32.0 - 36.0 g/dL Final    RDW 01/31/2025 12.8  11.5 - 14.5 % Final    Platelet Count 01/31/2025 126 (L)  150 - 400 K/uL Final    MPV 01/31/2025 10.5  9.4 - 12.4 fL Final    Neutrophils % 01/31/2025 65.9 (H)  53.0 - 65.0 % Final    Lymphocytes % 01/31/2025 23.3 (L)  27.0 - 41.0 % Final    Monocytes % 01/31/2025 7.6 (H)  2.0 - 6.0 % Final    Eosinophils %  "01/31/2025 2.2  1.0 - 4.0 % Final    Basophils % 01/31/2025 0.5  0.0 - 1.0 % Final    Immature Granulocytes % 01/31/2025 0.5 (H)  0.0 - 0.4 % Final    nRBC, Auto 01/31/2025 0.0  <=0.0 % Final    Neutrophils, Abs 01/31/2025 2.69  1.80 - 7.70 K/uL Final    Lymphocytes, Absolute 01/31/2025 0.95 (L)  1.00 - 4.80 K/uL Final    Monocytes, Absolute 01/31/2025 0.31  0.00 - 0.80 K/uL Final    Eosinophils, Absolute 01/31/2025 0.09  0.00 - 0.50 K/uL Final    Basophils, Absolute 01/31/2025 0.02  0.00 - 0.20 K/uL Final    Immature Granulocytes, Absolute 01/31/2025 0.02  0.00 - 0.04 K/uL Final    nRBC, Absolute 01/31/2025 0.00  <=0.00 x10e3/uL Final    Diff Type 01/31/2025 Auto   Final       Vitals:   Vitals:    05/19/25 1441 05/19/25 1442   BP: (!) 157/85    Pulse: 77    Resp: 18    Weight: 75.3 kg (166 lb)    Height: 5' 4" (1.626 m)    PainSc:   8   8        Review of Systems   Constitutional: Negative.    HENT: Negative.     Eyes: Negative.    Respiratory: Negative.     Cardiovascular: Negative.    Gastrointestinal: Negative.    Endocrine: Negative.    Genitourinary: Negative.    Musculoskeletal:  Positive for back pain, gait problem and leg pain.   Integumentary:  Negative.   Neurological:  Positive for numbness (BLE).   Hematological: Negative.    Psychiatric/Behavioral: Negative.         PHYSICAL EXAMINATION:  Physical Exam  Vitals and nursing note reviewed.   Constitutional:       General: She is not in acute distress.     Appearance: Normal appearance. She is not ill-appearing, toxic-appearing or diaphoretic.   HENT:      Head: Normocephalic and atraumatic.      Nose: Nose normal.      Mouth/Throat:      Mouth: Mucous membranes are moist.   Eyes:      Extraocular Movements: Extraocular movements intact.      Pupils: Pupils are equal, round, and reactive to light.   Cardiovascular:      Rate and Rhythm: Normal rate and regular rhythm.      Heart sounds: Normal heart sounds.   Pulmonary:      Effort: Pulmonary effort is " normal. No respiratory distress.      Breath sounds: Normal breath sounds. No stridor. No wheezing or rhonchi.   Abdominal:      General: Bowel sounds are normal.      Palpations: Abdomen is soft.   Musculoskeletal:         General: No swelling or deformity.      Cervical back: Normal and normal range of motion. No spasms or tenderness. No pain with movement. Normal range of motion.      Thoracic back: Normal.      Lumbar back: Tenderness and bony tenderness present. No spasms. Decreased range of motion. Negative right straight leg raise test and negative left straight leg raise test. No scoliosis.      Right lower leg: No edema.      Left lower leg: No edema.      Comments: Lumbar pain with flexion, extension and lateral rotation   Skin:     General: Skin is warm.   Neurological:      General: No focal deficit present.      Mental Status: She is alert and oriented to person, place, and time. Mental status is at baseline.      Cranial Nerves: No cranial nerve deficit.      Sensory: Sensation is intact. No sensory deficit.      Motor: No weakness.      Coordination: Coordination normal.      Gait: Gait abnormal.      Deep Tendon Reflexes: Reflexes are normal and symmetric.   Psychiatric:         Mood and Affect: Mood normal.         Behavior: Behavior normal.                      Assessment and Plan:  Plan:  1. reviewed  2. Schedule bilateral L5-S1 transforaminal epidural steroid injection and epidurogram utilizing fluoroscopy.  Orders Placed This Encounter   Procedures    Case Request Operating Room: Bilateral L5-S1 TFESI and epiduralgram utilizing fluoproscopy     Medical Necessity::   Medically Non-Urgent [100]     Case classification:   E - Elective [90]     Post-Procedure Disposition::   PACU [1]     Estimated Length of Stay::   0 midnight     Implant Required::   No [1001]     Is an on-site pathologist required for this procedure?:   N/A      3. Indications for this procedure for this specific patient  include the following   -History, physical examination and concordant radiological image based diagnostic testing supports medical necessity for an epidural steroid injection  - neurogenic claudication due to central disc pathology, osteophyte complexes, severe degenerative disc disease producing foraminal or central stenosis  - repeat epidural steroid injection is medically necessary.  The 1st injection significantly provided improvement of at least 80 % sustained improvement in pain relief, improvement in function from baseline for least 3 months.  - Pt has been in pain for at least 6 weeks and has failed conservative care (e.g. Exercise, physical methods, NSAID/ and or muscle relaxants)   - Pt has no major risk factors for spinal cancer or contraindicated condition   - Neurogenic claudication and Radicular pain are interfering with functional activity  - Pain is associated with symptoms of nerve root irritation   - Any numbness documented is accompanied with paresthesia   - No evidence of systemic or local infection, bleeding tendency or unstable medical condition   - Epidural provided as part of a comprehensive pain management program  - All repeat injections have at least 80% pain relief and increase functional gain and physical activity, and reduction in reliance on the use of medication and or physical therapy  - Pt has significant functional limitation resulting in diminished quality of life and impaired age appropriate ADL's.   - Diagnostic evaluation has ruled out other causes of pain  - Pt participating in an active rehabilitation program or home exercise program which has been discussed with the patient including heat ice and rest  - No more than 3 epidurals will be done in a 6 month period at the same level with at least 7 days between injections  - MAC is only offered in cases of needle phobia   - Injection done at L5-S1  level which is consistent with patient's dermatomal pain complaint    4.Monitored  Anesthesia Care medical necessity authorization request:    Monitor anesthesia request is medically indicated for the scheduled nerve block procedure due to:  - needle phobia and anxiety, placing  the patient at risk during the provided service.  -patient has an ASA class greater than 3 and requires constant presence of an anesthesiologist during the procedure:  -patient has severe problems with muscles and muscle spasticity that makes it hard to lie still  -patient suffers from chronic pain and is unable to function due to  diminished ADLs    5.The planned medically necessary  surgical procedure is performed in a hospital outpatient department and not in an ambulatory surgical center due to:     -there is no geographically assessable ambulatory surgery center that has the  necessary equipment and fluoroscopy needed for the procedure     -there is no geographically assessable ambulatory surgical center available at which the physician has privileges     -an ASC's  specific  guideline regarding the individuals weight or health conditions that prevent the use of an ASC       -injections must be performed under fluoroscopy image guidance with contrast unless the patient has a documented contrast allergy or pregnancy          report:  Reviewed and consistent with medication use as prescribed.  Assessment & Plan    LUMBAR DISC DISORDER AND RADICULOPATHY:  - Recommend bilateral L5-S1 transforaminal epidural steroid injection.  - Procedure would take 5-10 minutes and involve placing needles on both sides of the back.  - Discussed using anesthesia for the procedure.  - Patient agreed to the injection based on previous positive experience with pain relief.    NEUROSURGICAL EVALUATION:  - Referred to neurosurgeon at [facility] for evaluation.         This note was generated with the assistance of ambient listening technology. Verbal consent was obtained by the patient and accompanying visitor(s) for the recording of  patient appointment to facilitate this note. I attest to having reviewed and edited the generated note for accuracy, though some syntax or spelling errors may persist. Please contact the author of this note for any clarification.       Date of encounter: 5/19/2025  Krista Adorno MD        [1]   Current Outpatient Medications:     albuterol (PROVENTIL HFA) 90 mcg/actuation inhaler, Inhale 2 puffs into the lungs every 4 (four) hours as needed for Wheezing or Shortness of Breath. Rescue, Disp: 18 g, Rfl: 11    aspirin (ECOTRIN) 81 MG EC tablet, Take 81 mg by mouth., Disp: , Rfl:     clotrimazole-betamethasone 1-0.05% (LOTRISONE) cream, Apply topically 2 (two) times daily. For the rash on hand, Disp: 45 g, Rfl: 5    gabapentin (NEURONTIN) 300 MG capsule, Take 1 capsule (300 mg total) by mouth 3 (three) times daily., Disp: 90 capsule, Rfl: 11    metroNIDAZOLE (METROGEL) 0.75 % gel, Apply topically 2 (two) times daily. For face, Disp: 45 g, Rfl: 11    NIFEdipine (PROCARDIA-XL) 30 MG (OSM) 24 hr tablet, Take 1 tablet (30 mg total) by mouth once daily., Disp: 90 tablet, Rfl: 3    ondansetron (ZOFRAN-ODT) 4 MG TbDL, Take 1 tablet (4 mg total) by mouth every 8 (eight) hours as needed (nausea)., Disp: 30 tablet, Rfl: 11    pantoprazole (PROTONIX) 40 MG tablet, Take 1 tablet by mouth once daily., Disp: , Rfl:     tacrolimus (PROGRAF) 1 MG Cap, Take by mouth. Take 2 mg in the morning 3 mg at night, Disp: , Rfl:

## 2025-05-19 NOTE — PATIENT INSTRUCTIONS
YOU ARE SCHEDULED ON 06/05/2025 AT 1:15 PM FOR YOUR PROCEDURE- BILATERAL L5-S1 TFESI WITH  .        Procedure Instructions:    Nothing to eat or drink for 8 hours or after midnight including gum, candy, mints, or tobacco products.  If you are scheduled for 1:30 or later nothing to eat or drink after 5 a.m. the morning of the procedure, including gum, candy, mints, or tobacco products.  Must have a  at least 18 yrs of age to stay present at all times  No Diabetic medications the morning of procedure, check blood sugar the morning of procedure, if it is greater than 200 call the office at [phone]  If you are started on antibiotics or have been prescribed antibiotics, have a fever, or have any other type of infection call to reschedule procedure.  If you take blood pressure medications you can take it at your regular scheduled time with a small sip of WATER!  Dentures are to be removed prior to procedure or we can provide you with a denture cup to remove them prior to being taken back for procedure.   False eyelashes are to be removed before the morning of procedure.    Contacts will have to be removed prior to procedure.  No jewelry is to be worn to the procedure.     HOLD ASPIRIN AND ASPIRIN PRODUCTS  (ASPIRIN, BC POWDER ETC. ) FOR 7 DAYS  PRIOR TO PROCEDURE  HOLD NSAIDS( ibuprofen, mobic, meloxicam, advil, diclofenac, naproxen, relafen, celebrex,  methotrexate, aleve etc....)  FOR 3 DAYS   PRIOR TO PROCEDURE        SIGNATURE:________________________________________________________________________________________________________

## 2025-05-19 NOTE — PROGRESS NOTES
Krista Adorno M.D.  [facility]  [address] [place] [address], [place] [address]    Established    Chief complaint: Low-back Pain (Patient is having low back pain.)       HPI:   History of Present Illness    CHIEF COMPLAINT:  Lower back and leg pain with worsening symptoms extending to shoulder blades and neck.    HPI:  Patient presents for follow-up of lower back and leg pain, last seen on February 19th of this year. She reports worsening pain, now extending from the lower back up through the shoulder blades and to the neck. She describes occasional leg pain but more frequently numbness, stating her legs have minimal pain but experience paresthesia. Pain is aggravated by all activities recently. She denies weakness or falls.    She received COLELEN  injections in the past, with the last one providing about 60-67% pain relief lasting for a few months. Both legs are equally affected.    She has a history of liver transplant performed 3 years ago, with no complications reported after previous injections. She expresses anxiety about procedures despite extensive medical history.    She denies urinary incontinence.    PREVIOUS TREATMENTS:  Patient previously received epidural injections, which provided approximately 60-67% pain relief lasting for a few months. She reported that the treatment worked well for a period of time.    IMAGING:  An MRI revealed disc bulges at L5 with severe foraminal stenosis on the left side. The imaging also showed stenosis at L3-4 and L4-5 levels.    SURGICAL HISTORY:  Patient underwent a liver transplant three years ago.          Review of patient's allergies indicates:  No Known Allergies    Current Medications[1]    HISTORY:  Past Medical History:   Diagnosis Date    Abnormal uterine bleeding (AUB) 04/28/2021    Elevated serum creatinine 07/27/2021    Rechecked at 2.1 following completion of treatment for Hep C.    Hepatitis C     Liver transplant recipient 04/06/2022    Last Assessment  & Plan:   Formatting of this note might be different from the original.  OLT 4/5/22  DCD  CIT 8:49  WIT 34 min   Duct to duct  chronic HCV now s/p treatment in 2021 with SVR  With decompensated cirrhosis MELD 26  LFTs trending down        Past Surgical History:   Procedure Laterality Date    CHOLECYSTECTOMY      EPIDURAL STEROID INJECTION INTO LUMBAR SPINE Bilateral 11/12/2024    Procedure: L5-S1 COLLEEN;  Surgeon: Krista Adorno MD;  Location: [facility];  Service: Pain Management;  Laterality: Bilateral;    HERNIA REPAIR      HYSTERECTOMY      LIVER TRANSPLANT      ROBOT-ASSISTED LAPAROSCOPIC HYSTERECTOMY N/A 04/29/2021    Procedure: ROBOTIC HYSTERECTOMY;  Surgeon: Swapna Sow DO;  Location: [facility] OR;  Service: OB/GYN;  Laterality: N/A;    ROBOT-ASSISTED LAPAROSCOPIC SALPINGO-OOPHORECTOMY Bilateral 04/29/2021    Procedure: ROBOTIC SALPINGO-OOPHORECTOMY;  Surgeon: Swapna Sow DO;  Location: [facility] OR;  Service: OB/GYN;  Laterality: Bilateral;    TONSILLECTOMY        Family History   Problem Relation Name Age of Onset    Heart disease Father Father     No Known Problems Mother        Social History: She  reports that she has never smoked. She has never used smokeless tobacco. She reports that she does not currently use alcohol. She reports that she does not use drugs.    Imaging:  [place] Fluoro for Pain Management  See OP Notes for results.     IMPRESSION: See OP Notes for results.     This procedure was auto-finalized by: Virtual Radiologist       Labs:  Lab Visit on 03/06/2025   Component Date Value Ref Range Status    Sodium 03/06/2025 132 (L)  136 - 145 mmol/L Final    Potassium 03/06/2025 5.3 (H)  3.5 - 5.1 mmol/L Final    Chloride 03/06/2025 103  98 - 107 mmol/L Final    CO2 03/06/2025 25  23 - 31 mmol/L Final    Anion Gap 03/06/2025 9  7 - 16 mmol/L Final    Glucose 03/06/2025 104  82 - 115 mg/dL Final    BUN 03/06/2025 19  10 - 20 mg/dL Final    Creatinine 03/06/2025 1.21 (H)  0.55  - 1.02 mg/dL Final    BUN/Creatinine Ratio 03/06/2025 16  6 - 20 Final    Calcium 03/06/2025 9.0  8.4 - 10.2 mg/dL Final    Total Protein 03/06/2025 6.9  5.8 - 7.6 g/dL Final    Albumin 03/06/2025 3.7  3.4 - 4.8 g/dL Final    Globulin 03/06/2025 3.2  2.0 - 4.0 g/dL Final    A/G Ratio 03/06/2025 1.2   Final    Bilirubin, Total 03/06/2025 0.4  <=1.5 mg/dL Final    Alk Phos 03/06/2025 153 (H)  40 - 150 U/L Final    ALT 03/06/2025 29  <=55 U/L Final    AST 03/06/2025 19  5 - 34 U/L Final    eGFR 03/06/2025 50 (L)  >=60 mL/min/1.73m2 Final    Phosphorus 03/06/2025 3.9  2.3 - 4.7 mg/dL Final    Magnesium 03/06/2025 1.8  1.6 - 2.6 mg/dL Final    Bilirubin, Direct 03/06/2025 0.2  <0.5 mg/dL Final    Tacrolimus 03/06/2025 5.7  5.0 - 15.0 ng/mL Final    GGT 03/06/2025 166 (H)  9 - 36 U/L Final    WBC 03/06/2025 3.68 (L)  4.50 - 11.00 K/uL Final    RBC 03/06/2025 4.08 (L)  4.20 - 5.40 M/uL Final    Hemoglobin 03/06/2025 11.7 (L)  12.0 - 16.0 g/dL Final    Hematocrit 03/06/2025 37.1 (L)  38.0 - 47.0 % Final    MCV 03/06/2025 90.9  80.0 - 96.0 fL Final    MCH 03/06/2025 28.7  27.0 - 31.0 pg Final    MCHC 03/06/2025 31.5 (L)  32.0 - 36.0 g/dL Final    RDW 03/06/2025 12.6  11.5 - 14.5 % Final    Platelet Count 03/06/2025 135 (L)  150 - 400 K/uL Final    MPV 03/06/2025 10.8  9.4 - 12.4 fL Final    Neutrophils % 03/06/2025 65.5 (H)  53.0 - 65.0 % Final    Lymphocytes % 03/06/2025 23.1 (L)  27.0 - 41.0 % Final    Monocytes % 03/06/2025 8.4 (H)  2.0 - 6.0 % Final    Eosinophils % 03/06/2025 1.6  1.0 - 4.0 % Final    Basophils % 03/06/2025 1.1 (H)  0.0 - 1.0 % Final    Immature Granulocytes % 03/06/2025 0.3  0.0 - 0.4 % Final    nRBC, Auto 03/06/2025 0.0  <=0.0 % Final    Neutrophils, Abs 03/06/2025 2.41  1.80 - 7.70 K/uL Final    Lymphocytes, Absolute 03/06/2025 0.85 (L)  1.00 - 4.80 K/uL Final    Monocytes, Absolute 03/06/2025 0.31  0.00 - 0.80 K/uL Final    Eosinophils, Absolute 03/06/2025 0.06  0.00 - 0.50 K/uL Final    Basophils,  Absolute 03/06/2025 0.04  0.00 - 0.20 K/uL Final    Immature Granulocytes, Absolute 03/06/2025 0.01  0.00 - 0.04 K/uL Final    nRBC, Absolute 03/06/2025 0.00  <=0.00 x10e3/uL Final    Diff Type 03/06/2025 Auto   Final   Office Visit on 02/27/2025   Component Date Value Ref Range Status    POC Amphetamines 02/27/2025 Negative  Negative, Inconclusive Final    POC Barbiturates 02/27/2025 Negative  Negative, Inconclusive Final    POC Benzodiazepines 02/27/2025 Negative  Negative, Inconclusive Final    POC Cocaine 02/27/2025 Negative  Negative, Inconclusive Final    POC THC 02/27/2025 Negative  Negative, Inconclusive Final    POC Methadone 02/27/2025 Negative  Negative, Inconclusive Final    POC Methamphetamine 02/27/2025 Negative  Negative, Inconclusive Final    POC Opiates 02/27/2025 Presumptive Positive (A)  Negative, Inconclusive Final    POC Oxycodone 02/27/2025 Negative  Negative, Inconclusive Final    POC Phencyclidine 02/27/2025 Negative  Negative, Inconclusive Final    POC Methylenedioxymethamphetamine * 02/27/2025 Negative  Negative, Inconclusive Final    POC Tricyclic Antidepressants 02/27/2025 Negative  Negative, Inconclusive Final    POC Buprenorphine 02/27/2025 Negative   Final     Acceptable 02/27/2025 Yes   Final    POC Temperature (Urine) 02/27/2025 90   Final   Lab Visit on 02/27/2025   Component Date Value Ref Range Status    Tacrolimus 02/27/2025 4.5 (L)  5.0 - 15.0 ng/mL Final    Phosphorus 02/27/2025 3.1  2.3 - 4.7 mg/dL Final    Magnesium 02/27/2025 1.7  1.6 - 2.6 mg/dL Final    GGT 02/27/2025 442 (H)  9 - 36 U/L Final    Sodium 02/27/2025 135 (L)  136 - 145 mmol/L Final    Potassium 02/27/2025 4.8  3.5 - 5.1 mmol/L Final    Chloride 02/27/2025 104  98 - 107 mmol/L Final    CO2 02/27/2025 25  23 - 31 mmol/L Final    Anion Gap 02/27/2025 11  7 - 16 mmol/L Final    Glucose 02/27/2025 126 (H)  82 - 115 mg/dL Final    BUN 02/27/2025 26 (H)  10 - 20 mg/dL Final    Creatinine 02/27/2025  1.59 (H)  0.55 - 1.02 mg/dL Final    BUN/Creatinine Ratio 02/27/2025 16  6 - 20 Final    Calcium 02/27/2025 9.1  8.4 - 10.2 mg/dL Final    Total Protein 02/27/2025 7.0  5.8 - 7.6 g/dL Final    Albumin 02/27/2025 3.7  3.4 - 4.8 g/dL Final    Globulin 02/27/2025 3.3  2.0 - 4.0 g/dL Final    A/G Ratio 02/27/2025 1.1   Final    Bilirubin, Total 02/27/2025 0.3  <=1.5 mg/dL Final    Alk Phos 02/27/2025 322 (H)  40 - 150 U/L Final    ALT 02/27/2025 207 (H)  <=55 U/L Final    AST 02/27/2025 151 (H)  5 - 34 U/L Final    eGFR 02/27/2025 36 (L)  >=60 mL/min/1.73m2 Final    Bilirubin, Direct 02/27/2025 0.1  <0.5 mg/dL Final    WBC 02/27/2025 4.58  4.50 - 11.00 K/uL Final    RBC 02/27/2025 4.11 (L)  4.20 - 5.40 M/uL Final    Hemoglobin 02/27/2025 12.1  12.0 - 16.0 g/dL Final    Hematocrit 02/27/2025 38.1  38.0 - 47.0 % Final    MCV 02/27/2025 92.7  80.0 - 96.0 fL Final    MCH 02/27/2025 29.4  27.0 - 31.0 pg Final    MCHC 02/27/2025 31.8 (L)  32.0 - 36.0 g/dL Final    RDW 02/27/2025 12.8  11.5 - 14.5 % Final    Platelet Count 02/27/2025 123 (L)  150 - 400 K/uL Final    MPV 02/27/2025 10.9  9.4 - 12.4 fL Final    Neutrophils % 02/27/2025 71.6 (H)  53.0 - 65.0 % Final    Lymphocytes % 02/27/2025 19.0 (L)  27.0 - 41.0 % Final    Monocytes % 02/27/2025 7.6 (H)  2.0 - 6.0 % Final    Eosinophils % 02/27/2025 0.9 (L)  1.0 - 4.0 % Final    Basophils % 02/27/2025 0.7  0.0 - 1.0 % Final    Immature Granulocytes % 02/27/2025 0.2  0.0 - 0.4 % Final    nRBC, Auto 02/27/2025 0.0  <=0.0 % Final    Neutrophils, Abs 02/27/2025 3.28  1.80 - 7.70 K/uL Final    Lymphocytes, Absolute 02/27/2025 0.87 (L)  1.00 - 4.80 K/uL Final    Monocytes, Absolute 02/27/2025 0.35  0.00 - 0.80 K/uL Final    Eosinophils, Absolute 02/27/2025 0.04  0.00 - 0.50 K/uL Final    Basophils, Absolute 02/27/2025 0.03  0.00 - 0.20 K/uL Final    Immature Granulocytes, Absolute 02/27/2025 0.01  0.00 - 0.04 K/uL Final    nRBC, Absolute 02/27/2025 0.00  <=0.00 x10e3/uL Final     Diff Type 02/27/2025 Auto   Final   Lab Visit on 01/31/2025   Component Date Value Ref Range Status    Tacrolimus 01/31/2025 8.0  5.0 - 15.0 ng/mL Final    Phosphorus 01/31/2025 4.0  2.3 - 4.7 mg/dL Final    Magnesium 01/31/2025 1.7  1.6 - 2.6 mg/dL Final    GGT 01/31/2025 14  9 - 36 U/L Final    Sodium 01/31/2025 131 (L)  136 - 145 mmol/L Final    Potassium 01/31/2025 5.3 (H)  3.5 - 5.1 mmol/L Final    Chloride 01/31/2025 100  98 - 107 mmol/L Final    CO2 01/31/2025 22 (L)  23 - 31 mmol/L Final    Anion Gap 01/31/2025 14  7 - 16 mmol/L Final    Glucose 01/31/2025 105  82 - 115 mg/dL Final    BUN 01/31/2025 18  10 - 20 mg/dL Final    Creatinine 01/31/2025 1.24 (H)  0.55 - 1.02 mg/dL Final    BUN/Creatinine Ratio 01/31/2025 15  6 - 20 Final    Calcium 01/31/2025 9.0  8.4 - 10.2 mg/dL Final    Total Protein 01/31/2025 7.2  5.8 - 7.6 g/dL Final    Albumin 01/31/2025 3.8  3.4 - 4.8 g/dL Final    Globulin 01/31/2025 3.4  2.0 - 4.0 g/dL Final    A/G Ratio 01/31/2025 1.1   Final    Bilirubin, Total 01/31/2025 0.4  <=1.5 mg/dL Final    Alk Phos 01/31/2025 98  40 - 150 U/L Final    ALT 01/31/2025 8  <=55 U/L Final    AST 01/31/2025 27  5 - 34 U/L Final    eGFR 01/31/2025 48 (L)  >=60 mL/min/1.73m2 Final    Bilirubin, Direct 01/31/2025 0.1  <0.5 mg/dL Final    WBC 01/31/2025 4.08 (L)  4.50 - 11.00 K/uL Final    RBC 01/31/2025 4.20  4.20 - 5.40 M/uL Final    Hemoglobin 01/31/2025 12.3  12.0 - 16.0 g/dL Final    Hematocrit 01/31/2025 38.7  38.0 - 47.0 % Final    MCV 01/31/2025 92.1  80.0 - 96.0 fL Final    MCH 01/31/2025 29.3  27.0 - 31.0 pg Final    MCHC 01/31/2025 31.8 (L)  32.0 - 36.0 g/dL Final    RDW 01/31/2025 12.8  11.5 - 14.5 % Final    Platelet Count 01/31/2025 126 (L)  150 - 400 K/uL Final    MPV 01/31/2025 10.5  9.4 - 12.4 fL Final    Neutrophils % 01/31/2025 65.9 (H)  53.0 - 65.0 % Final    Lymphocytes % 01/31/2025 23.3 (L)  27.0 - 41.0 % Final    Monocytes % 01/31/2025 7.6 (H)  2.0 - 6.0 % Final    Eosinophils %  "01/31/2025 2.2  1.0 - 4.0 % Final    Basophils % 01/31/2025 0.5  0.0 - 1.0 % Final    Immature Granulocytes % 01/31/2025 0.5 (H)  0.0 - 0.4 % Final    nRBC, Auto 01/31/2025 0.0  <=0.0 % Final    Neutrophils, Abs 01/31/2025 2.69  1.80 - 7.70 K/uL Final    Lymphocytes, Absolute 01/31/2025 0.95 (L)  1.00 - 4.80 K/uL Final    Monocytes, Absolute 01/31/2025 0.31  0.00 - 0.80 K/uL Final    Eosinophils, Absolute 01/31/2025 0.09  0.00 - 0.50 K/uL Final    Basophils, Absolute 01/31/2025 0.02  0.00 - 0.20 K/uL Final    Immature Granulocytes, Absolute 01/31/2025 0.02  0.00 - 0.04 K/uL Final    nRBC, Absolute 01/31/2025 0.00  <=0.00 x10e3/uL Final    Diff Type 01/31/2025 Auto   Final       Vitals:   Vitals:    05/19/25 1441 05/19/25 1442   BP: (!) 157/85    Pulse: 77    Resp: 18    Weight: 75.3 kg (166 lb)    Height: 5' 4" (1.626 m)    PainSc:   8   8        Review of Systems   Constitutional: Negative.    HENT: Negative.     Eyes: Negative.    Respiratory: Negative.     Cardiovascular: Negative.    Gastrointestinal: Negative.    Endocrine: Negative.    Genitourinary: Negative.    Musculoskeletal:  Positive for back pain, gait problem and leg pain.   Integumentary:  Negative.   Neurological:  Positive for numbness (BLE).   Hematological: Negative.    Psychiatric/Behavioral: Negative.         PHYSICAL EXAMINATION:  Physical Exam  Vitals and nursing note reviewed.   Constitutional:       General: She is not in acute distress.     Appearance: Normal appearance. She is not ill-appearing, toxic-appearing or diaphoretic.   HENT:      Head: Normocephalic and atraumatic.      Nose: Nose normal.      Mouth/Throat:      Mouth: Mucous membranes are moist.   Eyes:      Extraocular Movements: Extraocular movements intact.      Pupils: Pupils are equal, round, and reactive to light.   Cardiovascular:      Rate and Rhythm: Normal rate and regular rhythm.      Heart sounds: Normal heart sounds.   Pulmonary:      Effort: Pulmonary effort is " normal. No respiratory distress.      Breath sounds: Normal breath sounds. No stridor. No wheezing or rhonchi.   Abdominal:      General: Bowel sounds are normal.      Palpations: Abdomen is soft.   Musculoskeletal:         General: No swelling or deformity.      Cervical back: Normal and normal range of motion. No spasms or tenderness. No pain with movement. Normal range of motion.      Thoracic back: Normal.      Lumbar back: Tenderness and bony tenderness present. No spasms. Decreased range of motion. Negative right straight leg raise test and negative left straight leg raise test. No scoliosis.      Right lower leg: No edema.      Left lower leg: No edema.      Comments: Lumbar pain with flexion, extension and lateral rotation   Skin:     General: Skin is warm.   Neurological:      General: No focal deficit present.      Mental Status: She is alert and oriented to person, place, and time. Mental status is at baseline.      Cranial Nerves: No cranial nerve deficit.      Sensory: Sensation is intact. No sensory deficit.      Motor: No weakness.      Coordination: Coordination normal.      Gait: Gait abnormal.      Deep Tendon Reflexes: Reflexes are normal and symmetric.   Psychiatric:         Mood and Affect: Mood normal.         Behavior: Behavior normal.                      Assessment and Plan:  Plan:  1. reviewed  2. Schedule bilateral L5-S1 transforaminal epidural steroid injection and epidurogram utilizing fluoroscopy.  Orders Placed This Encounter   Procedures    Case Request Operating Room: Bilateral L5-S1 TFESI and epiduralgram utilizing fluoproscopy     Medical Necessity::   Medically Non-Urgent [100]     Case classification:   E - Elective [90]     Post-Procedure Disposition::   PACU [1]     Estimated Length of Stay::   0 midnight     Implant Required::   No [1001]     Is an on-site pathologist required for this procedure?:   N/A      3. Indications for this procedure for this specific patient  include the following   -History, physical examination and concordant radiological image based diagnostic testing supports medical necessity for an epidural steroid injection  - neurogenic claudication due to central disc pathology, osteophyte complexes, severe degenerative disc disease producing foraminal or central stenosis  - repeat epidural steroid injection is medically necessary.  The 1st injection significantly provided improvement of at least 80 % sustained improvement in pain relief, improvement in function from baseline for least 3 months.  - Pt has been in pain for at least 6 weeks and has failed conservative care (e.g. Exercise, physical methods, NSAID/ and or muscle relaxants)   - Pt has no major risk factors for spinal cancer or contraindicated condition   - Neurogenic claudication and Radicular pain are interfering with functional activity  - Pain is associated with symptoms of nerve root irritation   - Any numbness documented is accompanied with paresthesia   - No evidence of systemic or local infection, bleeding tendency or unstable medical condition   - Epidural provided as part of a comprehensive pain management program  - All repeat injections have at least 80% pain relief and increase functional gain and physical activity, and reduction in reliance on the use of medication and or physical therapy  - Pt has significant functional limitation resulting in diminished quality of life and impaired age appropriate ADL's.   - Diagnostic evaluation has ruled out other causes of pain  - Pt participating in an active rehabilitation program or home exercise program which has been discussed with the patient including heat ice and rest  - No more than 3 epidurals will be done in a 6 month period at the same level with at least 7 days between injections  - MAC is only offered in cases of needle phobia   - Injection done at L5-S1  level which is consistent with patient's dermatomal pain complaint    4.Monitored  Anesthesia Care medical necessity authorization request:    Monitor anesthesia request is medically indicated for the scheduled nerve block procedure due to:  - needle phobia and anxiety, placing  the patient at risk during the provided service.  -patient has an ASA class greater than 3 and requires constant presence of an anesthesiologist during the procedure:  -patient has severe problems with muscles and muscle spasticity that makes it hard to lie still  -patient suffers from chronic pain and is unable to function due to  diminished ADLs    5.The planned medically necessary  surgical procedure is performed in a hospital outpatient department and not in an ambulatory surgical center due to:     -there is no geographically assessable ambulatory surgery center that has the  necessary equipment and fluoroscopy needed for the procedure     -there is no geographically assessable ambulatory surgical center available at which the physician has privileges     -an ASC's  specific  guideline regarding the individuals weight or health conditions that prevent the use of an ASC       -injections must be performed under fluoroscopy image guidance with contrast unless the patient has a documented contrast allergy or pregnancy          report:  Reviewed and consistent with medication use as prescribed.  Assessment & Plan    LUMBAR DISC DISORDER AND RADICULOPATHY:  - Recommend bilateral L5-S1 transforaminal epidural steroid injection.  - Procedure would take 5-10 minutes and involve placing needles on both sides of the back.  - Discussed using anesthesia for the procedure.  - Patient agreed to the injection based on previous positive experience with pain relief.    NEUROSURGICAL EVALUATION:  - Referred to neurosurgeon at [facility] for evaluation.         This note was generated with the assistance of ambient listening technology. Verbal consent was obtained by the patient and accompanying visitor(s) for the recording of  patient appointment to facilitate this note. I attest to having reviewed and edited the generated note for accuracy, though some syntax or spelling errors may persist. Please contact the author of this note for any clarification.       Date of encounter: 5/19/2025  Krista Adorno MD        [1]   Current Outpatient Medications:     albuterol (PROVENTIL HFA) 90 mcg/actuation inhaler, Inhale 2 puffs into the lungs every 4 (four) hours as needed for Wheezing or Shortness of Breath. Rescue, Disp: 18 g, Rfl: 11    aspirin (ECOTRIN) 81 MG EC tablet, Take 81 mg by mouth., Disp: , Rfl:     clotrimazole-betamethasone 1-0.05% (LOTRISONE) cream, Apply topically 2 (two) times daily. For the rash on hand, Disp: 45 g, Rfl: 5    gabapentin (NEURONTIN) 300 MG capsule, Take 1 capsule (300 mg total) by mouth 3 (three) times daily., Disp: 90 capsule, Rfl: 11    metroNIDAZOLE (METROGEL) 0.75 % gel, Apply topically 2 (two) times daily. For face, Disp: 45 g, Rfl: 11    NIFEdipine (PROCARDIA-XL) 30 MG (OSM) 24 hr tablet, Take 1 tablet (30 mg total) by mouth once daily., Disp: 90 tablet, Rfl: 3    ondansetron (ZOFRAN-ODT) 4 MG TbDL, Take 1 tablet (4 mg total) by mouth every 8 (eight) hours as needed (nausea)., Disp: 30 tablet, Rfl: 11    pantoprazole (PROTONIX) 40 MG tablet, Take 1 tablet by mouth once daily., Disp: , Rfl:     tacrolimus (PROGRAF) 1 MG Cap, Take by mouth. Take 2 mg in the morning 3 mg at night, Disp: , Rfl:

## 2025-06-05 ENCOUNTER — ANESTHESIA (OUTPATIENT)
Dept: PAIN MEDICINE | Facility: HOSPITAL | Age: 65
End: 2025-06-05
Payer: MEDICARE

## 2025-06-05 ENCOUNTER — HOSPITAL ENCOUNTER (OUTPATIENT)
Facility: HOSPITAL | Age: 65
Discharge: HOME | End: 2025-06-05
Attending: PAIN MEDICINE | Admitting: PAIN MEDICINE
Payer: MEDICARE

## 2025-06-05 ENCOUNTER — ANESTHESIA EVENT (OUTPATIENT)
Dept: PAIN MEDICINE | Facility: HOSPITAL | Age: 65
End: 2025-06-05
Payer: MEDICARE

## 2025-06-05 VITALS
HEART RATE: 64 BPM | SYSTOLIC BLOOD PRESSURE: 151 MMHG | OXYGEN SATURATION: 100 % | BODY MASS INDEX: 27.72 KG/M2 | DIASTOLIC BLOOD PRESSURE: 80 MMHG | WEIGHT: 162.38 LBS | RESPIRATION RATE: 15 BRPM | TEMPERATURE: 97 F | HEIGHT: 64 IN

## 2025-06-05 DIAGNOSIS — M54.17 LUMBOSACRAL RADICULOPATHY: ICD-10-CM

## 2025-06-05 PROCEDURE — 64493 INJ PARAVERT F JNT L/S 1 LEV: CPT | Mod: 50 | Performed by: PAIN MEDICINE

## 2025-06-05 PROCEDURE — 64494 INJ PARAVERT F JNT L/S 2 LEV: CPT | Mod: 50,,, | Performed by: PAIN MEDICINE

## 2025-06-05 PROCEDURE — 63600175 PHARM REV CODE 636 W HCPCS: Performed by: NURSE ANESTHETIST, CERTIFIED REGISTERED

## 2025-06-05 PROCEDURE — 27000284 HC CANNULA NASAL: Performed by: NURSE ANESTHETIST, CERTIFIED REGISTERED

## 2025-06-05 PROCEDURE — 64494 INJ PARAVERT F JNT L/S 2 LEV: CPT | Mod: 50 | Performed by: PAIN MEDICINE

## 2025-06-05 PROCEDURE — 25500020 PHARM REV CODE 255: Performed by: PAIN MEDICINE

## 2025-06-05 PROCEDURE — 64493 INJ PARAVERT F JNT L/S 1 LEV: CPT | Mod: 50,,, | Performed by: PAIN MEDICINE

## 2025-06-05 PROCEDURE — 37000009 HC ANESTHESIA EA ADD 15 MINS: Performed by: PAIN MEDICINE

## 2025-06-05 PROCEDURE — 63600175 PHARM REV CODE 636 W HCPCS: Performed by: PAIN MEDICINE

## 2025-06-05 PROCEDURE — 37000008 HC ANESTHESIA 1ST 15 MINUTES: Performed by: PAIN MEDICINE

## 2025-06-05 RX ORDER — PROPOFOL 10 MG/ML
VIAL (ML) INTRAVENOUS
Status: DISCONTINUED | OUTPATIENT
Start: 2025-06-05 | End: 2025-06-05

## 2025-06-05 RX ORDER — TRIAMCINOLONE ACETONIDE 40 MG/ML
INJECTION, SUSPENSION INTRA-ARTICULAR; INTRAMUSCULAR CODE/TRAUMA/SEDATION MEDICATION
Status: DISCONTINUED | OUTPATIENT
Start: 2025-06-05 | End: 2025-06-05 | Stop reason: HOSPADM

## 2025-06-05 RX ORDER — IOPAMIDOL 612 MG/ML
INJECTION, SOLUTION INTRAVASCULAR CODE/TRAUMA/SEDATION MEDICATION
Status: DISCONTINUED | OUTPATIENT
Start: 2025-06-05 | End: 2025-06-05 | Stop reason: HOSPADM

## 2025-06-05 RX ORDER — SODIUM CHLORIDE 9 MG/ML
INJECTION, SOLUTION INTRAVENOUS CONTINUOUS
Status: DISCONTINUED | OUTPATIENT
Start: 2025-06-05 | End: 2025-06-05 | Stop reason: HOSPADM

## 2025-06-05 RX ORDER — LIDOCAINE HYDROCHLORIDE 20 MG/ML
INJECTION INTRAVENOUS
Status: DISCONTINUED | OUTPATIENT
Start: 2025-06-05 | End: 2025-06-05

## 2025-06-05 RX ADMIN — PROPOFOL 100 MG: 10 INJECTION, EMULSION INTRAVENOUS at 02:06

## 2025-06-05 RX ADMIN — LIDOCAINE HYDROCHLORIDE 50 MG: 20 INJECTION, SOLUTION INTRAVENOUS at 02:06

## 2025-06-05 NOTE — OP NOTE
Procedure Note    Procedure Date: 6/5/2025    Procedure Performed:  Bilateral Lumbar Facet  Medial Branch Block @ L4-5, L5-S1 with Fluoroscopic Guidance    Indications: Patient has failed conservative therapy.      Pre-op diagnosis: Lumbar Spondylosis    Post-op diagnosis: same    Physician: ROGERIO Adorno MD    Anesthesia: MAC    Estimated Blood Loss: Less than 1cc    IVF: Per Anesthesia    Complications: None    Technique:  The patient was interviewed in the holding area and Risks/Benefits were discussed, including but not limited to  the possibility of new or different pain, bleeding or infection.   All questions were answered.  The patient understood and accepted risks.  Consent was reviewed and signed.  A time out was taken to identify the patient, procedure and side of the procedure. The patient was placed in a prone position, then prepped and draped in the usual sterile fashion using ChloraPrep and sterile towels.  The levels were determined under fluoroscopic guidance and then marked.  1% Lidocaine was given by raising a skin wheal at the skin over each site and then infiltrated.  A 22-gauge 3.5 inch needle was introduced to the anatomic location of the bilateral L4-5, L5-S1 medial branch nerves.  Then, after negative aspiration, 1 cc from a  mixture of Bupivacaine 0.25% 3cc  and  40mg kenalog ) was injected @ each level.The patient tolerated the procedure well and was transferred to the [facility] in stable condition.  The patient was monitored after the procedure.  Patient was given post procedure and discharge instructions to follow at home.  The patient was discharged in a stable condition with an adult .

## 2025-06-05 NOTE — DISCHARGE SUMMARY
[facility]  Discharge Note  Short Stay    Procedure(s) (LRB):  Bilateral L5-S1 TFESI and epiduralgram utilizing fluoproscopy (Bilateral)      OUTCOME: Patient tolerated treatment/procedure well without complication and is now ready for discharge.    DISPOSITION: Home or Self Care    FINAL DIAGNOSIS:  Lumbosacral spondylosis without myelopathy    FOLLOWUP: In clinic    DISCHARGE INSTRUCTIONS:  See nurse's notes     TIME SPENT ON DISCHARGE: 5 minutes

## 2025-06-05 NOTE — BRIEF OP NOTE
The  Discharge Note  Short Stay    Admit Date: 6/5/2025    Discharge Date: 6/5/2025    Attending Physician: Krista Adorno     Discharge Provider: Krista Adorno    Diagnosis:  Lumbosacral spondylosis without myelopathy    Procedure performed:  Bilateral L4-5, L5-S1 medial branch block utilizing fluoroscopy    Findings: Procedure tolerated well and without complications. Consistent with diagnosis.    EBL: 0cc    Specimens: None    Discharged Condition: Good    Final Diagnoses: Lumbosacral radiculopathy [M54.17]    Disposition: Home or Self Care    Hospital Course: No complications, uneventful    Outcome of Hospitalization, Treatment, Procedure, or Surgery:  Patient was admitted for outpatient interventional pain management procedure. The patient tolerated the procedure well with no complications.    Follow up/Patient Instructions:  Follow up as scheduled in Pain Management office in 3-4 weeks.  Patient has received instructions and follow up date and time.    Medications:  Continue previous medications

## 2025-06-05 NOTE — PLAN OF CARE
REFER TO WRITTEN DOCUMENT AND RECOVERY INFORMATION.    D/CD PATIENT [facility] WHEELCHAIR AT 1505.    INFORMED PATIENT IF UNABLE TO VOID IN 8 HOURS, GO TO ER. NOTIFY MD OF REDNESS OR DRAINAGE FROM INJECTION SITE OR FEVER OVER 3-4 DAY. REST AND DRINK PLENTY OF FLUIDS FOR THE REMAINDER OF THE DAY. NO LIFTING OVER 5 LBS FOR THE REMAINDER OF THE DAY. CONTINUE REGULAR MEDICATIONS AS PRESCRIBED. MAY TAKE PAIN MEDICATION AS PRESCRIBED.     PAIN IMPROVED  100%  PRE OP PAIN 7.  POSTOP PAIN 0.

## 2025-06-16 PROBLEM — M47.817 LUMBOSACRAL SPONDYLOSIS WITHOUT MYELOPATHY: Chronic | Status: ACTIVE | Noted: 2025-06-16

## 2025-06-18 ENCOUNTER — PATIENT MESSAGE (OUTPATIENT)
Dept: PAIN MEDICINE | Facility: CLINIC | Age: 65
End: 2025-06-18
Payer: MEDICARE

## 2025-08-01 ENCOUNTER — PATIENT MESSAGE (OUTPATIENT)
Facility: HOSPITAL | Age: 65
End: 2025-08-01
Payer: MEDICARE

## 2025-08-28 ENCOUNTER — OFFICE VISIT (OUTPATIENT)
Dept: DERMATOLOGY | Facility: CLINIC | Age: 65
End: 2025-08-28
Payer: MEDICARE

## 2025-08-28 DIAGNOSIS — L71.9 ROSACEA: ICD-10-CM

## 2025-08-28 DIAGNOSIS — Z80.8 FAMILY HISTORY OF MELANOMA: ICD-10-CM

## 2025-08-28 DIAGNOSIS — D48.9 NEOPLASM OF UNCERTAIN BEHAVIOR: Primary | ICD-10-CM

## 2025-08-28 PROCEDURE — 88305 TISSUE EXAM BY PATHOLOGIST: CPT | Mod: 26,,, | Performed by: PATHOLOGY

## 2025-08-28 PROCEDURE — 88305 TISSUE EXAM BY PATHOLOGIST: CPT | Mod: TC,91,SUR | Performed by: PATHOLOGY

## 2025-08-28 RX ORDER — METRONIDAZOLE TOPICAL 7.5 MG/G
GEL TOPICAL 2 TIMES DAILY
Qty: 45 G | Refills: 11 | Status: SHIPPED | OUTPATIENT
Start: 2025-08-28 | End: 2026-08-28

## (undated) DEVICE — TRAY NERVE BLOCK UNIV 10/CA

## (undated) DEVICE — KIT IV START

## (undated) DEVICE — OBTURATOR BLADELESS 8MM REG

## (undated) DEVICE — Device

## (undated) DEVICE — GLOVE SURGICAL PROTEXIS PI SIZE 7

## (undated) DEVICE — MONOPOLAR FOOT SWITCHING LAP CHORD

## (undated) DEVICE — TROCAR BLADELESS Z-THREAD 12MM X 150MM

## (undated) DEVICE — SET EXT STD BORE CATH 7.6IN

## (undated) DEVICE — ACCESSORY TIP COVER

## (undated) DEVICE — GLOVE SURGICAL PROTEXIS PI BLUE SIZE 6.0

## (undated) DEVICE — APPLICATOR CHLORAPREP ORN 26ML

## (undated) DEVICE — CDS ROBOTIC

## (undated) DEVICE — SLIPPER FALL PREV YEL BARIAT

## (undated) DEVICE — WARMER SCOPE DISP

## (undated) DEVICE — SCISSOR MONOPOLAR CURVED DAVINCI

## (undated) DEVICE — OCCLUDER RUMI COLPO PNEUMO

## (undated) DEVICE — SOL IRRIGATION SALINE 3000ML BAG

## (undated) DEVICE — SOL IRRIGATION SALINE 0.9% 1000ML BOTTLE

## (undated) DEVICE — SUTURE VICRYL 4-0 FS2 UNDYED 27 IN

## (undated) DEVICE — GLOVE SENSICARE PI SURG 6.5

## (undated) DEVICE — DRESSING TELFA NONADH 3X4 STL AMD

## (undated) DEVICE — GLOVE SURGICAL PROTEXIS PI SIZE 6

## (undated) DEVICE — NDL TUOHY 22G X 3.5

## (undated) DEVICE — NEEDLEDRIVER SUTURE CUT DAVINCI

## (undated) DEVICE — NDL TUOHY EPIDURAL 20GX3.5IN

## (undated) DEVICE — COVER LIGHT HANDLE FLEX PK/2

## (undated) DEVICE — SYR EPILOR LUER-LOK LOR 7ML

## (undated) DEVICE — CATH INTROCAN SAF 2 IV 22GX1IN

## (undated) DEVICE — GLOVE SURGICAL PROTEXIS PI SIZE 6.5

## (undated) DEVICE — GOWN SURGICAL SMARTGOWN LEVEL 4 / EXTRA LARGE STERILE

## (undated) DEVICE — CATH IV INTROCAN 24G X 3/4

## (undated) DEVICE — TROCAR BLADELESS Z-THREAD 5MM X 100MM

## (undated) DEVICE — GOWN LARGE NON REINFORCED

## (undated) DEVICE — INTUITIVE 3-ARM DISPOSABLE KIT

## (undated) DEVICE — V-LOC WOUND CLOSURE DEVICE 2-0 V-20